# Patient Record
Sex: MALE | Race: WHITE | NOT HISPANIC OR LATINO | Employment: FULL TIME | URBAN - METROPOLITAN AREA
[De-identification: names, ages, dates, MRNs, and addresses within clinical notes are randomized per-mention and may not be internally consistent; named-entity substitution may affect disease eponyms.]

---

## 2017-01-13 ENCOUNTER — HOSPITAL ENCOUNTER (EMERGENCY)
Facility: CLINIC | Age: 32
Discharge: HOME | End: 2017-01-14
Attending: EMERGENCY MEDICINE | Admitting: EMERGENCY MEDICINE
Payer: COMMERCIAL

## 2017-01-13 DIAGNOSIS — S05.32XA: ICD-10-CM

## 2017-01-13 PROCEDURE — 99207 ZZC APP CREDIT; MD BILLING SHARED VISIT: CPT | Mod: Z6 | Performed by: EMERGENCY MEDICINE

## 2017-01-13 PROCEDURE — 96374 THER/PROPH/DIAG INJ IV PUSH: CPT

## 2017-01-13 PROCEDURE — 99285 EMERGENCY DEPT VISIT HI MDM: CPT | Mod: 25

## 2017-01-13 PROCEDURE — 96376 TX/PRO/DX INJ SAME DRUG ADON: CPT

## 2017-01-13 NOTE — IP AVS SNAPSHOT
MRN:[id number]                      After Visit Summary   1/13/2017    Enrique Gabriel    MRN: [id number]           Thank you!     Thank you for choosing [facility] for your care. Our goal is always to provide you with excellent care. Hearing back from our patients is one way we can continue to improve our services. Please take a few minutes to complete the written survey that you may receive in the mail after you visit with us. Thank you!        Patient Information     Date Of Birth          1985        About your hospital stay     You were admitted on:  N/A You last received care in the:  [facility]    You were discharged on:  January 14, 2017       Who to Call     For medical emergencies, please call 911.  For non-urgent questions about your medical care, please call your primary care provider or clinic, None  For questions related to your surgery, please call your surgery clinic        Attending Provider     Provider    Jimi Bunch MD       Primary Care Provider    Physician No Ref-Primary       No address on file        Further instructions from your care team       Follow up appointment at Opthalmology Clinic  [address] [place] [address]  [facility]   Sunday January 15th @ 9 am       Leave left eye patch/taped dressing over left eye until tomorrow's appointment. Any concerns please call Opthalmology Clinic answering service and/or [facility], ask for Surgery/Opthalmology on-call.     You can take your prescribed Percocet as directed. Do not use eye drops until you see your physician tomorrow.    Tips for taking pain medications    To get the best pain relief possible , remember these points:      Take pain medications as directed, before pain becomes severe      Pain medication can upset your stomach: taking it with food may help      Constipation is a common side effect of pain medication. Drink plenty of  Fluids      Eat foods high in  fiber. Take a stool softener  if recommended by your doctor or  Pharmacist.        Do not drink alcohol, drive or operate machinery while taking pain medications.      Ask about other ways to control pain, such as with heat, ice or relaxation.  [facility], [place]  Same-Day Surgery   Adult Discharge Orders & Instructions     For 24 hours after surgery    1. Get plenty of rest.  A responsible adult must stay with you for at least 24 hours after you leave the hospital.   2. Do not drive or use heavy equipment.  If you have weakness or tingling, don't drive or use heavy equipment until this feeling goes away.  3. Do not drink alcohol.  4. Avoid strenuous or risky activities.  Ask for help when climbing stairs.   5. You may feel lightheaded.  IF so, sit for a few minutes before standing.  Have someone help you get up.   6. If you have nausea (feel sick to your stomach): Drink only clear liquids such as apple juice, ginger ale, broth or 7-Up.  Rest may also help.  Be sure to drink enough fluids.  Move to a regular diet as you feel able.  7. You may have a slight fever. Call the doctor if your fever is over 100 F (37.7 C) (taken under the tongue) or lasts longer than 24 hours.  8. You may have a dry mouth, a sore throat, muscle aches or trouble sleeping.  These should go away after 24 hours.  9. Do not make important or legal decisions.   Call your doctor for any of the followin.  Signs of infection (fever, growing tenderness at the surgery site, a large amount of drainage or bleeding, severe pain, foul-smelling drainage, redness, swelling).    2. It has been over 8 to 10 hours since surgery and you are still not able to urinate (pass water).    3.  Headache for over 24 hours.    4.  Numbness, tingling or weakness the day after surgery (if you had spinal anesthesia).  To contact a doctor, call _______ @ _[phone]______ or:        [phone] and ask for the resident on  "call for   ___Surgery-Opthalmology__ (answered 24 hours a day)      Emergency Department:    [facility]: [phone]       (TTY for hearing impaired: [phone])    [facility]: [phone]       (TTY for hearing impaired: [phone])        Pending Results     No orders found for last 2 day(s).            Admission Information        Department Dept Phone    1/13/2017 [facility] Preop/Phase [phone]      Your Vitals Were     Blood Pressure Temperature Respirations    133/85 mmHg 98.6  F (37  C) (Oral) 16    Height Weight BMI (Body Mass Index)    1.676 m (5' 6\") 81.647 kg (180 lb) 29.07 kg/m2    Pulse Oximetry          97%        Care EveryWhere ID     This is your Care EveryWhere ID. This could be used by other organizations to access your [place] medical records  [id number]           Review of your medicines      START taking        Dose / Directions    ofloxacin 0.3 % ophthalmic solution   Commonly known as:  OCUFLOX   Used for:  Ruptured globe, left, initial encounter        Dose:  1 drop   Place 1 drop Into the left eye 4 times daily   Quantity:  1 Bottle   Refills:  11       oxyCODONE-acetaminophen 5-325 MG per tablet   Commonly known as:  PERCOCET   Used for:  Ruptured globe, left, initial encounter        Dose:  1 tablet   Take 1 tablet by mouth every 4 hours as needed for moderate to severe pain   Quantity:  12 tablet   Refills:  0       prednisoLONE acetate 1 % ophthalmic susp   Commonly known as:  PRED FORTE   Used for:  Ruptured globe, left, initial encounter        Dose:  1 drop   Place 1 drop Into the left eye 4 times daily   Quantity:  10 mL   Refills:  0            Where to get your medicines      Some of these will need a paper prescription and others can be bought over the counter. Ask your nurse if you have questions.     Bring a paper prescription for each of these medications    - ofloxacin 0.3 % ophthalmic solution  - oxyCODONE-acetaminophen 5-325 MG per tablet  - " prednisoLONE acetate 1 % ophthalmic susp             Protect others around you: Learn how to safely use, store and throw away your medicines at www.disposemymeds.org.             Medication List: This is a list of all your medications and when to take them. Check marks below indicate your daily home schedule. Keep this list as a reference.      Medications           Morning Afternoon Evening Bedtime As Needed    ofloxacin 0.3 % ophthalmic solution   Commonly known as:  OCUFLOX   Place 1 drop Into the left eye 4 times daily                                oxyCODONE-acetaminophen 5-325 MG per tablet   Commonly known as:  PERCOCET   Take 1 tablet by mouth every 4 hours as needed for moderate to severe pain                                prednisoLONE acetate 1 % ophthalmic susp   Commonly known as:  PRED FORTE   Place 1 drop Into the left eye 4 times daily

## 2017-01-13 NOTE — IP AVS SNAPSHOT
Same Day Surgery [address] [address]    Phone:  [phone]                                       After Visit Summary   1/13/2017    Enrique Gabriel    MRN: [id number]           After Visit Summary Signature Page     I have received my discharge instructions, and my questions have been answered. I have discussed any challenges I see with this plan with the nurse or doctor.    ..........................................................................................................................................  Patient/Patient Representative Signature      ..........................................................................................................................................  Patient Representative Print Name and Relationship to Patient    ..................................................               ................................................  Date                                            Time    ..........................................................................................................................................  Reviewed by Signature/Title    ...................................................              ..............................................  Date                                                            Time

## 2017-01-14 ENCOUNTER — ANESTHESIA EVENT (OUTPATIENT)
Dept: SURGERY | Facility: CLINIC | Age: 32
End: 2017-01-14
Payer: COMMERCIAL

## 2017-01-14 ENCOUNTER — ANESTHESIA (OUTPATIENT)
Dept: SURGERY | Facility: CLINIC | Age: 32
End: 2017-01-14
Payer: COMMERCIAL

## 2017-01-14 VITALS
WEIGHT: 180 LBS | DIASTOLIC BLOOD PRESSURE: 73 MMHG | BODY MASS INDEX: 28.93 KG/M2 | SYSTOLIC BLOOD PRESSURE: 137 MMHG | OXYGEN SATURATION: 98 % | TEMPERATURE: 97.8 F | RESPIRATION RATE: 16 BRPM | HEIGHT: 66 IN

## 2017-01-14 PROCEDURE — 25800025 ZZH RX 258: Performed by: OPHTHALMOLOGY

## 2017-01-14 PROCEDURE — 25000125 ZZHC RX 250: Performed by: NURSE ANESTHETIST, CERTIFIED REGISTERED

## 2017-01-14 PROCEDURE — 25000125 ZZHC RX 250: Performed by: OPHTHALMOLOGY

## 2017-01-14 PROCEDURE — 96376 TX/PRO/DX INJ SAME DRUG ADON: CPT | Mod: XU

## 2017-01-14 PROCEDURE — 71000014 ZZH RECOVERY PHASE 1 LEVEL 2 FIRST HR: Performed by: OPHTHALMOLOGY

## 2017-01-14 PROCEDURE — 25000566 ZZH SEVOFLURANE, EA 15 MIN: Performed by: OPHTHALMOLOGY

## 2017-01-14 PROCEDURE — 25000125 ZZHC RX 250: Performed by: ANESTHESIOLOGY

## 2017-01-14 PROCEDURE — 25000132 ZZH RX MED GY IP 250 OP 250 PS 637: Performed by: ANESTHESIOLOGY

## 2017-01-14 PROCEDURE — 36000062 ZZH SURGERY LEVEL 4 1ST 30 MIN - UMMC: Performed by: OPHTHALMOLOGY

## 2017-01-14 PROCEDURE — 71000027 ZZH RECOVERY PHASE 2 EACH 15 MINS: Performed by: OPHTHALMOLOGY

## 2017-01-14 PROCEDURE — 96374 THER/PROPH/DIAG INJ IV PUSH: CPT | Mod: XU

## 2017-01-14 PROCEDURE — 37000009 ZZH ANESTHESIA TECHNICAL FEE, EACH ADDTL 15 MIN: Performed by: OPHTHALMOLOGY

## 2017-01-14 PROCEDURE — 40000170 ZZH STATISTIC PRE-PROCEDURE ASSESSMENT II: Performed by: OPHTHALMOLOGY

## 2017-01-14 PROCEDURE — 25000132 ZZH RX MED GY IP 250 OP 250 PS 637: Performed by: OPHTHALMOLOGY

## 2017-01-14 PROCEDURE — 25000125 ZZHC RX 250: Performed by: STUDENT IN AN ORGANIZED HEALTH CARE EDUCATION/TRAINING PROGRAM

## 2017-01-14 PROCEDURE — 36000064 ZZH SURGERY LEVEL 4 EA 15 ADDTL MIN - UMMC: Performed by: OPHTHALMOLOGY

## 2017-01-14 PROCEDURE — 37000008 ZZH ANESTHESIA TECHNICAL FEE, 1ST 30 MIN: Performed by: OPHTHALMOLOGY

## 2017-01-14 PROCEDURE — 27210794 ZZH OR GENERAL SUPPLY STERILE: Performed by: OPHTHALMOLOGY

## 2017-01-14 RX ORDER — LIDOCAINE HYDROCHLORIDE 20 MG/ML
INJECTION, SOLUTION INFILTRATION; PERINEURAL PRN
Status: DISCONTINUED | OUTPATIENT
Start: 2017-01-14 | End: 2017-01-14

## 2017-01-14 RX ORDER — FENTANYL CITRATE 50 UG/ML
INJECTION, SOLUTION INTRAMUSCULAR; INTRAVENOUS PRN
Status: DISCONTINUED | OUTPATIENT
Start: 2017-01-14 | End: 2017-01-14

## 2017-01-14 RX ORDER — LABETALOL HYDROCHLORIDE 5 MG/ML
10 INJECTION, SOLUTION INTRAVENOUS
Status: DISCONTINUED | OUTPATIENT
Start: 2017-01-14 | End: 2017-01-14 | Stop reason: HOSPADM

## 2017-01-14 RX ORDER — FENTANYL CITRATE 50 UG/ML
25-50 INJECTION, SOLUTION INTRAMUSCULAR; INTRAVENOUS
Status: DISCONTINUED | OUTPATIENT
Start: 2017-01-14 | End: 2017-01-14 | Stop reason: HOSPADM

## 2017-01-14 RX ORDER — OXYCODONE AND ACETAMINOPHEN 5; 325 MG/1; MG/1
1 TABLET ORAL EVERY 4 HOURS PRN
Qty: 12 TABLET | Refills: 0 | Status: SHIPPED | OUTPATIENT
Start: 2017-01-14 | End: 2017-02-08

## 2017-01-14 RX ORDER — MEPERIDINE HYDROCHLORIDE 25 MG/ML
12.5 INJECTION INTRAMUSCULAR; INTRAVENOUS; SUBCUTANEOUS EVERY 5 MIN PRN
Status: DISCONTINUED | OUTPATIENT
Start: 2017-01-14 | End: 2017-01-14 | Stop reason: HOSPADM

## 2017-01-14 RX ORDER — KETOROLAC TROMETHAMINE 30 MG/ML
30 INJECTION, SOLUTION INTRAMUSCULAR; INTRAVENOUS
Status: DISCONTINUED | OUTPATIENT
Start: 2017-01-14 | End: 2017-01-14 | Stop reason: HOSPADM

## 2017-01-14 RX ORDER — HYDROMORPHONE HYDROCHLORIDE 1 MG/ML
.3-.5 INJECTION, SOLUTION INTRAMUSCULAR; INTRAVENOUS; SUBCUTANEOUS EVERY 10 MIN PRN
Status: DISCONTINUED | OUTPATIENT
Start: 2017-01-14 | End: 2017-01-14 | Stop reason: HOSPADM

## 2017-01-14 RX ORDER — CEFAZOLIN SODIUM 1 G/3ML
INJECTION, POWDER, FOR SOLUTION INTRAMUSCULAR; INTRAVENOUS PRN
Status: DISCONTINUED | OUTPATIENT
Start: 2017-01-14 | End: 2017-01-14

## 2017-01-14 RX ORDER — ERYTHROMYCIN 5 MG/G
OINTMENT OPHTHALMIC PRN
Status: DISCONTINUED | OUTPATIENT
Start: 2017-01-14 | End: 2017-01-14 | Stop reason: HOSPADM

## 2017-01-14 RX ORDER — PROPOFOL 10 MG/ML
INJECTION, EMULSION INTRAVENOUS PRN
Status: DISCONTINUED | OUTPATIENT
Start: 2017-01-14 | End: 2017-01-14

## 2017-01-14 RX ORDER — NALOXONE HYDROCHLORIDE 0.4 MG/ML
.1-.4 INJECTION, SOLUTION INTRAMUSCULAR; INTRAVENOUS; SUBCUTANEOUS
Status: DISCONTINUED | OUTPATIENT
Start: 2017-01-14 | End: 2017-01-14 | Stop reason: HOSPADM

## 2017-01-14 RX ORDER — NEOSTIGMINE METHYLSULFATE 1 MG/ML
VIAL (ML) INJECTION PRN
Status: DISCONTINUED | OUTPATIENT
Start: 2017-01-14 | End: 2017-01-14

## 2017-01-14 RX ORDER — ONDANSETRON 2 MG/ML
4 INJECTION INTRAMUSCULAR; INTRAVENOUS EVERY 30 MIN PRN
Status: DISCONTINUED | OUTPATIENT
Start: 2017-01-14 | End: 2017-01-14 | Stop reason: HOSPADM

## 2017-01-14 RX ORDER — LORAZEPAM 2 MG/ML
.5-1 INJECTION INTRAMUSCULAR
Status: DISCONTINUED | OUTPATIENT
Start: 2017-01-14 | End: 2017-01-14 | Stop reason: HOSPADM

## 2017-01-14 RX ORDER — SODIUM CHLORIDE, SODIUM LACTATE, POTASSIUM CHLORIDE, CALCIUM CHLORIDE 600; 310; 30; 20 MG/100ML; MG/100ML; MG/100ML; MG/100ML
INJECTION, SOLUTION INTRAVENOUS CONTINUOUS
Status: DISCONTINUED | OUTPATIENT
Start: 2017-01-14 | End: 2017-01-14 | Stop reason: HOSPADM

## 2017-01-14 RX ORDER — ONDANSETRON 2 MG/ML
INJECTION INTRAMUSCULAR; INTRAVENOUS PRN
Status: DISCONTINUED | OUTPATIENT
Start: 2017-01-14 | End: 2017-01-14

## 2017-01-14 RX ORDER — MEPERIDINE HYDROCHLORIDE 25 MG/ML
12.5 INJECTION INTRAMUSCULAR; INTRAVENOUS; SUBCUTANEOUS
Status: DISCONTINUED | OUTPATIENT
Start: 2017-01-14 | End: 2017-01-14 | Stop reason: HOSPADM

## 2017-01-14 RX ORDER — FENTANYL CITRATE 50 UG/ML
25-50 INJECTION, SOLUTION INTRAMUSCULAR; INTRAVENOUS EVERY 5 MIN PRN
Status: DISCONTINUED | OUTPATIENT
Start: 2017-01-14 | End: 2017-01-14 | Stop reason: HOSPADM

## 2017-01-14 RX ORDER — HYDROMORPHONE HYDROCHLORIDE 1 MG/ML
.3-.5 INJECTION, SOLUTION INTRAMUSCULAR; INTRAVENOUS; SUBCUTANEOUS EVERY 5 MIN PRN
Status: DISCONTINUED | OUTPATIENT
Start: 2017-01-14 | End: 2017-01-14 | Stop reason: HOSPADM

## 2017-01-14 RX ORDER — ONDANSETRON 4 MG/1
4 TABLET, ORALLY DISINTEGRATING ORAL EVERY 30 MIN PRN
Status: DISCONTINUED | OUTPATIENT
Start: 2017-01-14 | End: 2017-01-14 | Stop reason: HOSPADM

## 2017-01-14 RX ORDER — BALANCED SALT SOLUTION 6.4; .75; .48; .3; 3.9; 1.7 MG/ML; MG/ML; MG/ML; MG/ML; MG/ML; MG/ML
SOLUTION OPHTHALMIC PRN
Status: DISCONTINUED | OUTPATIENT
Start: 2017-01-14 | End: 2017-01-14 | Stop reason: HOSPADM

## 2017-01-14 RX ORDER — HYDRALAZINE HYDROCHLORIDE 20 MG/ML
2.5-5 INJECTION INTRAMUSCULAR; INTRAVENOUS EVERY 10 MIN PRN
Status: DISCONTINUED | OUTPATIENT
Start: 2017-01-14 | End: 2017-01-14 | Stop reason: HOSPADM

## 2017-01-14 RX ORDER — PROMETHAZINE HYDROCHLORIDE 25 MG/ML
12.5 INJECTION, SOLUTION INTRAMUSCULAR; INTRAVENOUS
Status: DISCONTINUED | OUTPATIENT
Start: 2017-01-14 | End: 2017-01-14 | Stop reason: HOSPADM

## 2017-01-14 RX ORDER — PREDNISOLONE ACETATE 10 MG/ML
1 SUSPENSION/ DROPS OPHTHALMIC 4 TIMES DAILY
Qty: 10 ML | Refills: 0 | Status: SHIPPED | OUTPATIENT
Start: 2017-01-14 | End: 2017-01-18

## 2017-01-14 RX ORDER — OXYCODONE AND ACETAMINOPHEN 5; 325 MG/1; MG/1
1 TABLET ORAL ONCE
Status: COMPLETED | OUTPATIENT
Start: 2017-01-14 | End: 2017-01-14

## 2017-01-14 RX ORDER — OFLOXACIN 3 MG/ML
1 SOLUTION/ DROPS OPHTHALMIC 4 TIMES DAILY
Qty: 1 BOTTLE | Refills: 11 | Status: SHIPPED | OUTPATIENT
Start: 2017-01-14 | End: 2017-01-18

## 2017-01-14 RX ORDER — ALBUTEROL SULFATE 0.83 MG/ML
2.5 SOLUTION RESPIRATORY (INHALATION) EVERY 4 HOURS PRN
Status: DISCONTINUED | OUTPATIENT
Start: 2017-01-14 | End: 2017-01-14 | Stop reason: HOSPADM

## 2017-01-14 RX ORDER — DEXAMETHASONE SODIUM PHOSPHATE 4 MG/ML
INJECTION, SOLUTION INTRA-ARTICULAR; INTRALESIONAL; INTRAMUSCULAR; INTRAVENOUS; SOFT TISSUE PRN
Status: DISCONTINUED | OUTPATIENT
Start: 2017-01-14 | End: 2017-01-14 | Stop reason: HOSPADM

## 2017-01-14 RX ORDER — GLYCOPYRROLATE 0.2 MG/ML
INJECTION, SOLUTION INTRAMUSCULAR; INTRAVENOUS PRN
Status: DISCONTINUED | OUTPATIENT
Start: 2017-01-14 | End: 2017-01-14

## 2017-01-14 RX ADMIN — Medication 1 TABLET: at 10:30

## 2017-01-14 RX ADMIN — FENTANYL CITRATE 25 MCG: 50 INJECTION, SOLUTION INTRAMUSCULAR; INTRAVENOUS at 09:47

## 2017-01-14 RX ADMIN — MIDAZOLAM HYDROCHLORIDE 2 MG: 1 INJECTION, SOLUTION INTRAMUSCULAR; INTRAVENOUS at 05:37

## 2017-01-14 RX ADMIN — PROPOFOL 160 MG: 10 INJECTION, EMULSION INTRAVENOUS at 05:45

## 2017-01-14 RX ADMIN — ONDANSETRON 6 MG: 2 INJECTION INTRAMUSCULAR; INTRAVENOUS at 07:31

## 2017-01-14 RX ADMIN — ROCURONIUM BROMIDE 50 MG: 10 INJECTION INTRAVENOUS at 05:45

## 2017-01-14 RX ADMIN — FENTANYL CITRATE 25 MCG: 50 INJECTION, SOLUTION INTRAMUSCULAR; INTRAVENOUS at 09:34

## 2017-01-14 RX ADMIN — CEFAZOLIN 1 G: 1 INJECTION, POWDER, FOR SOLUTION INTRAMUSCULAR; INTRAVENOUS at 07:56

## 2017-01-14 RX ADMIN — FENTANYL CITRATE 25 MCG: 50 INJECTION, SOLUTION INTRAMUSCULAR; INTRAVENOUS at 09:01

## 2017-01-14 RX ADMIN — FENTANYL CITRATE 50 MCG: 50 INJECTION, SOLUTION INTRAMUSCULAR; INTRAVENOUS at 05:45

## 2017-01-14 RX ADMIN — FENTANYL CITRATE 25 MCG: 50 INJECTION, SOLUTION INTRAMUSCULAR; INTRAVENOUS at 07:45

## 2017-01-14 RX ADMIN — LIDOCAINE HYDROCHLORIDE 80 MG: 20 INJECTION, SOLUTION INFILTRATION; PERINEURAL at 05:45

## 2017-01-14 RX ADMIN — SODIUM CHLORIDE, POTASSIUM CHLORIDE, SODIUM LACTATE AND CALCIUM CHLORIDE: 600; 310; 30; 20 INJECTION, SOLUTION INTRAVENOUS at 05:37

## 2017-01-14 RX ADMIN — FENTANYL CITRATE 25 MCG: 50 INJECTION, SOLUTION INTRAMUSCULAR; INTRAVENOUS at 09:08

## 2017-01-14 RX ADMIN — PROPOFOL 30 MG: 10 INJECTION, EMULSION INTRAVENOUS at 08:32

## 2017-01-14 RX ADMIN — Medication 3.5 MG: at 08:22

## 2017-01-14 RX ADMIN — FENTANYL CITRATE 25 MCG: 50 INJECTION, SOLUTION INTRAMUSCULAR; INTRAVENOUS at 07:28

## 2017-01-14 RX ADMIN — FENTANYL CITRATE 100 MCG: 50 INJECTION, SOLUTION INTRAMUSCULAR; INTRAVENOUS at 05:53

## 2017-01-14 RX ADMIN — SODIUM CHLORIDE, POTASSIUM CHLORIDE, SODIUM LACTATE AND CALCIUM CHLORIDE: 600; 310; 30; 20 INJECTION, SOLUTION INTRAVENOUS at 07:30

## 2017-01-14 RX ADMIN — GLYCOPYRROLATE 0.5 MG: 0.2 INJECTION, SOLUTION INTRAMUSCULAR; INTRAVENOUS at 08:22

## 2017-01-14 RX ADMIN — CEFAZOLIN 2 G: 1 INJECTION, POWDER, FOR SOLUTION INTRAMUSCULAR; INTRAVENOUS at 05:58

## 2017-01-14 RX ADMIN — OXYCODONE HYDROCHLORIDE AND ACETAMINOPHEN 1 TABLET: 5; 325 TABLET ORAL at 11:00

## 2017-01-14 ASSESSMENT — LIFESTYLE VARIABLES: TOBACCO_USE: 1

## 2017-01-14 ASSESSMENT — PAIN DESCRIPTION - DESCRIPTORS: DESCRIPTORS: ACHING;DISCOMFORT
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
## 2017-01-14 NOTE — OR NURSING
Paged Dr. Loza, and Christian for follow-up appointment and specific DC instructions on AVS. Added to AVS verbal instrctuctions from Dr. Garcia at bedside upon arrival to PACU.

## 2017-01-14 NOTE — OR NURSING
Spoke with patient mother Izabel, patient spouse and mother ([phone]) will arrive to hospital around 1400. Contacting family in cities to pick-up patient if possible prior to that time.:Left eye patch in place. CDI. [place].

## 2017-01-14 NOTE — ANESTHESIA PREPROCEDURE EVALUATION
Anesthesia Evaluation     . Pt has had prior anesthetic. Type: General      ROS/MED HX    ENT/Pulmonary:  - neg pulmonary ROS   (+)tobacco use, Current use 0.5 packs/day  , . .    Neurologic:  - neg neurologic ROS     Cardiovascular:  - neg cardiovascular ROS       METS/Exercise Tolerance:     Hematologic:  - neg hematologic  ROS       Musculoskeletal:  - neg musculoskeletal ROS       GI/Hepatic:         Renal/Genitourinary:  - ROS Renal section negative       Endo:  - neg endo ROS       Psychiatric:  - neg psychiatric ROS       Infectious Disease:  - neg infectious disease ROS       Malignancy:      - no malignancy   Other:               Physical Exam  Normal systems: cardiovascular and pulmonary    Airway   Mallampati: II  TM distance: >3 FB  Neck ROM: full    Dental   (+) missing    Cardiovascular       Pulmonary                     Anesthesia Plan      History & Physical Review  History and physical reviewed and following examination; no interval change.    ASA Status:  1 .    NPO Status:  > 8 hours    Plan for General with Intravenous and Propofol induction. Maintenance will be Balanced.    PONV prophylaxis:  Ondansetron (or other 5HT-3)       Postoperative Care  Postoperative pain management:  IV analgesics.      Consents  Anesthetic plan, risks, benefits and alternatives discussed with: .  Use of blood products discussed: No .   .                         .

## 2017-01-14 NOTE — ED NOTES
Bed: ED21  Expected date:   Expected time:   Means of arrival:   Comments:  Transfer from [facility]
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
## 2017-01-14 NOTE — ANESTHESIA CARE TRANSFER NOTE
Patient: Enrique Bustamantephrey    REPAIR RUPTURED GLOBE (Left Eye)  Additional InformationProcedure(s):  Ruputred globe left eye - Wound Class: I-Clean    Diagnosis: ruptured globe  Diagnosis Additional Information: No value filed.    Anesthesia Type:   General     Note:  Airway :Face Mask  Patient transferred to:PACU  Comments: Prior to extubation, patient breathing spontaneously and respiratory rate and tidal volume were appropriate. The patient was following commands. The patient was warm and demonstrated adequate strength. Patient was suctioned and extubated without complication.   Transported to PACU   VSS upon arrival   Care transferred to PACU RN        Vitals: (Last set prior to Anesthesia Care Transfer)              Electronically Signed By: Aureliano Wilson MD  January 14, 2017  8:47 AM

## 2017-01-14 NOTE — OR NURSING
Dr. Loza at bedside. Leave eye patch on left eye until tomorrow appointment. No eye drops until after then.

## 2017-01-14 NOTE — DISCHARGE INSTRUCTIONS
Follow up appointment at Opthalmology Clinic  [address] [facility]  [facility]   Sunday January 15th @ 9 am       Leave left eye patch/taped dressing over left eye until tomorrow's appointment. Any concerns please call Opthalmology Clinic answering service and/or [facility], ask for Surgery/Opthalmology on-call.     You can take your prescribed Percocet as directed. Do not use eye drops until you see your physician tomorrow.    Tips for taking pain medications    To get the best pain relief possible , remember these points:      Take pain medications as directed, before pain becomes severe      Pain medication can upset your stomach: taking it with food may help      Constipation is a common side effect of pain medication. Drink plenty of  Fluids      Eat foods high in fiber. Take a stool softener  if recommended by your doctor or  Pharmacist.        Do not drink alcohol, drive or operate machinery while taking pain medications.      Ask about other ways to control pain, such as with heat, ice or relaxation.  [facility], [place]  Same-Day Surgery   Adult Discharge Orders & Instructions     For 24 hours after surgery    1. Get plenty of rest.  A responsible adult must stay with you for at least 24 hours after you leave the hospital.   2. Do not drive or use heavy equipment.  If you have weakness or tingling, don't drive or use heavy equipment until this feeling goes away.  3. Do not drink alcohol.  4. Avoid strenuous or risky activities.  Ask for help when climbing stairs.   5. You may feel lightheaded.  IF so, sit for a few minutes before standing.  Have someone help you get up.   6. If you have nausea (feel sick to your stomach): Drink only clear liquids such as apple juice, ginger ale, broth or 7-Up.  Rest may also help.  Be sure to drink enough fluids.  Move to a regular diet as you feel able.  7. You may have a slight fever. Call the doctor if your fever is over  100 F (37.7 C) (taken under the tongue) or lasts longer than 24 hours.  8. You may have a dry mouth, a sore throat, muscle aches or trouble sleeping.  These should go away after 24 hours.  9. Do not make important or legal decisions.   Call your doctor for any of the followin.  Signs of infection (fever, growing tenderness at the surgery site, a large amount of drainage or bleeding, severe pain, foul-smelling drainage, redness, swelling).    2. It has been over 8 to 10 hours since surgery and you are still not able to urinate (pass water).    3.  Headache for over 24 hours.    4.  Numbness, tingling or weakness the day after surgery (if you had spinal anesthesia).  To contact a doctor, call _______ @ _[phone]______ or:        [phone] and ask for the resident on call for   ___Surgery-Opthalmology__ (answered 24 hours a day)      Emergency Department:    [facility]: [phone]       (TTY for hearing impaired: [phone])    [facility]: [phone]       (TTY for hearing impaired: [phone])

## 2017-01-14 NOTE — ANESTHESIA POSTPROCEDURE EVALUATION
Patient: Enrique Gabriel    REPAIR RUPTURED GLOBE (Left Eye)  Additional InformationProcedure(s):  Ruputred globe left eye - Wound Class: I-Clean    Diagnosis:ruptured globe  Diagnosis Additional Information: No value filed.    Anesthesia Type:  General    Note:  Anesthesia Post Evaluation    Patient location during evaluation: bedside and PACU  Patient participation: Able to fully participate in evaluation  Level of consciousness: awake and alert  Pain management: adequate  Airway patency: patent  Cardiovascular status: acceptable, blood pressure returned to baseline and stable  Respiratory status: acceptable and spontaneous ventilation  Hydration status: acceptable  PONV: none     Anesthetic complications: None    Comments: Eugenio Swenson MD  Staff Anesthesiologist  Phone: [phone]  January 14, 2017, 9:50 AM          Last vitals:  Filed Vitals:    01/14/17 0915 01/14/17 0930 01/14/17 0945   BP: 154/88 142/81 139/77   Temp:  36.7  C (98.1  F)    Resp: 18 16 16   SpO2: 94% 97% 93%       Electronically Signed By: Eugenio Swenson MD  January 14, 2017

## 2017-01-14 NOTE — BRIEF OP NOTE
Staff: Maryann Monroe MD    Fellow: Joselyn Loza MD    Resident: Arsen Gonzales MD    Pre-operative diagnosis:   1) Ruptured globe, left eye  2) Dislocated intraocular lens, left eye    Post-operative diagnosis:  1) Ruptured globe, left eye  2) Dislocated intraocular lens, left eye    Procedure performed:  1) Repair of ruptured globe, left eye    Anesthesia: General    Complications: None    Estimated blood loss: Less than 1 mL

## 2017-01-15 ENCOUNTER — OFFICE VISIT (OUTPATIENT)
Dept: OPHTHALMOLOGY | Facility: CLINIC | Age: 32
End: 2017-01-15

## 2017-01-15 DIAGNOSIS — S05.32XD RUPTURED GLOBE OF LEFT EYE, SUBSEQUENT ENCOUNTER: Primary | ICD-10-CM

## 2017-01-15 ASSESSMENT — TONOMETRY
OS_IOP_MMHG: 10
OD_IOP_MMHG: 15
IOP_METHOD: ICARE

## 2017-01-15 ASSESSMENT — CUP TO DISC RATIO: OD_RATIO: 0.25

## 2017-01-15 ASSESSMENT — EXTERNAL EXAM - LEFT EYE: OS_EXAM: NORMAL

## 2017-01-15 ASSESSMENT — VISUAL ACUITY
OD_CC: 20/30
METHOD: SNELLEN - LINEAR
OS_SC: CF @ 1'
CORRECTION_TYPE: CONTACTS

## 2017-01-15 ASSESSMENT — SLIT LAMP EXAM - LIDS
COMMENTS: NORMAL
COMMENTS: MILD LID EDEMA

## 2017-01-15 ASSESSMENT — EXTERNAL EXAM - RIGHT EYE: OD_EXAM: NORMAL

## 2017-01-15 NOTE — PROGRESS NOTES
I have confirmed the patient's and reviewed Past Medical History, Past Surgical History, Social History, Family History, Problem List, Medication List and agree with Tech note.    CC: Post op globe repair left eye    HPI: Enrique Gabriel is a 31 year old male POD#1 s/p globe repair, left eye. 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 Presented with dehiscence of cornea at limbus from 9 o'clock to 3'oclock with extremely hypotonous eye, vitreous in the anterior chamber, dislocated IOL, and possible retinal detachment. Underwent resuturing of cornea to limbus and limited peritomy without evidence of scleral laceration.    Today experiencing sharp, surface pain intermittently. Has not yet removed patch.     Ocular history additionally notable for previous globe rupture in the same (left) eye years ago s/p repair. States left eye was his dominant eye. Previous cataract surgery done at [facility] in [place].     Assessment/plan:   1.  POD#1 s/p repair of globe rupture/corneal dehiscence   -corneal wound reapproximated and Kerline negative   -intraocular pressure of 10   -dislocated IOL apparent with vitreous in the anterior chamber   -B scan shows vitreous debris (likely hemorrhage), ?retinal detachment   -start Prednisone QID   -start Vigamox QID   -no lifting greater than 10 pounds, no bending over past the waist. Sleep with eye shield at night and HOB elevated 30 degrees   -RTC Monday at 1230 for follow up in retina clinic with Dr Teresa. Plan of care was discussed with Dr Brii Gonzales MD  PGY3, Dept of Ophthalmology

## 2017-01-16 ENCOUNTER — OFFICE VISIT (OUTPATIENT)
Dept: OPHTHALMOLOGY | Facility: CLINIC | Age: 32
End: 2017-01-16
Attending: OPHTHALMOLOGY
Payer: COMMERCIAL

## 2017-01-16 DIAGNOSIS — H43.12 VITREOUS HEMORRHAGE, LEFT EYE (H): ICD-10-CM

## 2017-01-16 DIAGNOSIS — S05.32XA RUPTURED GLOBE OF LEFT EYE, INITIAL ENCOUNTER: Primary | ICD-10-CM

## 2017-01-16 DIAGNOSIS — Z98.890 POST-OPERATIVE STATE: Primary | ICD-10-CM

## 2017-01-16 PROCEDURE — 99213 OFFICE O/P EST LOW 20 MIN: CPT | Mod: ZF

## 2017-01-16 PROCEDURE — 40000269 ZZH STATISTIC NO CHARGE FACILITY FEE: Mod: ZF

## 2017-01-16 PROCEDURE — 76512 OPH US DX B-SCAN: CPT | Mod: ZF | Performed by: OPHTHALMOLOGY

## 2017-01-16 ASSESSMENT — EXTERNAL EXAM - RIGHT EYE
OD_EXAM: NORMAL
OD_EXAM: NORMAL

## 2017-01-16 ASSESSMENT — SLIT LAMP EXAM - LIDS
COMMENTS: MILD LID EDEMA
COMMENTS: MILD LID EDEMA
COMMENTS: NORMAL
COMMENTS: NORMAL

## 2017-01-16 ASSESSMENT — VISUAL ACUITY
CORRECTION_TYPE: CONTACTS
CORRECTION_TYPE: CONTACTS
OD_CC: 20/30
OD_CC+: +1
OS_CC: CF @ 1FT
OD_CC+: +1
METHOD: SNELLEN - LINEAR
OS_SC: CF @ 1 FT
METHOD: SNELLEN - LINEAR
OD_CC: 20/30

## 2017-01-16 ASSESSMENT — CUP TO DISC RATIO
OD_RATIO: 0.25
OD_RATIO: 0.25

## 2017-01-16 ASSESSMENT — TONOMETRY
OD_IOP_MMHG: 15
IOP_METHOD: ICARE
OS_IOP_MMHG: 06
OD_IOP_MMHG: 15
IOP_METHOD: ICARE
OS_IOP_MMHG: 06

## 2017-01-16 ASSESSMENT — EXTERNAL EXAM - LEFT EYE
OS_EXAM: NORMAL
OS_EXAM: NORMAL

## 2017-01-16 NOTE — MR AVS SNAPSHOT
After Visit Summary   1/16/2017    Enrique Gabriel    MRN: [id number]           Patient Information     Date Of Birth          1985        Visit Information        Provider Department      1/16/2017 12:30 PM Jennifer Teresa MD Eye Clinic        Today's Diagnoses     Post-operative state    -  1     Vitreous hemorrhage, left eye (H)            Follow-ups after your visit        Your next 10 appointments already scheduled     Jan 18, 2017  1:30 PM   RETURN RETINA with Jennifer Teresa MD   Eye Clinic ([facility])    [address] [place] [id number]   [phone]            Jan 24, 2017  1:15 PM   RETURN CORNEA with Maryann Monroe MD   Eye Clinic ([facility])    [address]  [address] [facility]  [address] [address]   [phone]              Who to contact     Please call your clinic at [phone] to:    Ask questions about your health    Make or cancel appointments    Discuss your medicines    Learn about your test results    Speak to your doctor   If you have compliments or concerns about an experience at your clinic, or if you wish to file a complaint, please contact [facility] Patient Relations at [phone] or email us at [email or web address]         Additional Information About Your Visit        Care EveryWhere ID     This is your Care EveryWhere ID. This could be used by other organizations to access your [facility] medical records  [id number]         Blood Pressure from Last 3 Encounters:   01/14/17 137/73    Weight from Last 3 Encounters:   01/13/17 81.647 kg (180 lb)              We Performed the Following     Ultrasound B-scan OS (left eye)     Ultrasound B-scan OS (left eye)          Today's Medication Changes          These changes are accurate as of: 1/16/17  2:19 PM.  If you have any questions, ask your nurse or doctor.                Start taking these medicines.        Dose/Directions    hypromellose 0.3 % Gel ophthalmic gel   Commonly known as:  GENTEAL   Used for:  Ruptured globe of left eye, initial encounter   Started by:  Jose G Hou MD        Dose:  0.5 inch   Place 0.5 g (0.5 inches) Into the left eye At Bedtime Apply approximately a half inch ribbon of ointment to the inside of your lower lids. Warning, application of the ointment to your eyes will cause temporary blurring of your vision from the ointment coating your eye. Please apply right before bedtime.   Quantity:  10 g   Refills:  11            Where to get your medicines      These medications were sent to [facility] [id number] [address] AT [address] [address]     Phone:  [phone]    - hypromellose 0.3 % Gel ophthalmic gel             Primary Care Provider    Physician No Ref-Primary       No address on file        Thank you!     Thank you for choosing EYE CLINIC  for your care. Our goal is always to provide you with excellent care. Hearing back from our patients is one way we can continue to improve our services. Please take a few minutes to complete the written survey that you may receive in the mail after your visit with us. Thank you!             Your Updated Medication List - Protect others around you: Learn how to safely use, store and throw away your medicines at www.disposemymeds.org.          This list is accurate as of: 1/16/17  2:19 PM.  Always use your most recent med list.                   Brand Name Dispense Instructions for use    hypromellose 0.3 % Gel ophthalmic gel    GENTEAL    10 g    Place 0.5 g (0.5 inches) Into the left eye At Bedtime Apply approximately a half inch ribbon of ointment to the inside of your lower lids. Warning, application of the ointment to your eyes will cause temporary blurring of your vision from the ointment coating your eye. Please apply right  before bedtime.       ofloxacin 0.3 % ophthalmic solution    OCUFLOX    1 Bottle    Place 1 drop Into the left eye 4 times daily       oxyCODONE-acetaminophen 5-325 MG per tablet    PERCOCET    12 tablet    Take 1 tablet by mouth every 4 hours as needed for moderate to severe pain       prednisoLONE acetate 1 % ophthalmic susp    PRED FORTE    10 mL    Place 1 drop Into the left eye 4 times daily

## 2017-01-16 NOTE — NURSING NOTE
Chief Complaints and History of Present Illnesses   Patient presents with     Post Op (Ophthalmology) Left Eye     S/P Globe repair with Corneal Dehisence 1-13-17     HPI    Last Eye Exam:  1/15/17   Affected eye(s):  Left   Symptoms:     Blurred vision         Do you have eye pain now?:  Yes   Location:  OS   Pain Level:  Mild Pain (2), Mild Pain (3)   Other:  Pain meds helpful, takes the edge off   Pain Frequency:  Intermittent   Pain Characteristics:  Sharp/Quick   Other:  More with movement      Comments:  Pt here for 2 day follow up. Some pain and discomfort, taking oral pain med which is helpful. A lot of tearing while keeping eye open. Photophobic. Vision remains very blurry. No problems using current drops and last drop used this morning. Notes that he is also been good and keeping head elevated. Notes that he has been told to see Cornea Dr today. ? GLADYS CHRISTINE, COA 12:38 PM 01/16/2017

## 2017-01-16 NOTE — PROGRESS NOTES
I have confirmed the patient's and reviewed Past Medical History, Past Surgical History, Social History, Family History, Problem List, Medication List and agree with Tech note.    CC: Post op globe repair left eye    HPI: Enrique Gabriel is a 31 year old male POD#2 s/p globe repair, left eye. 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 Presented with dehiscence of cornea at limbus from 9 o'clock to 3'oclock with extremely hypotonous eye, vitreous in the anterior chamber, dislocated IOL, and vitreous hemorrhage. questionable Retinal detachment  . Underwent resuturing of cornea to limbus and limited peritomy without evidence of scleral laceration.    Today subjectively with mild clearing of vision,still with intermittent scratching sensation with blink (were unable to bury all sutures).     B scan today shows vitreous debri, normal globe contour. Can not rule out Retinal detachment     Ocular history additionally notable for previous globe rupture in the same (left) eye years ago s/p repair. States left eye was his dominant eye. Previous cataract surgery done at [facility] in [place].     Assessment/plan:   1.  POD#2 s/p repair of globe rupture/corneal dehiscence   -corneal wound reapproximated and Kerline negative   -intraocular pressure of 06   -some vitreous in AC. Corneal edema.    -B scan shows vitreous debris (likely hemorrhage), no clear retinal detachment on exam or ultrasound   -increase Prednisone to 6x/day   -continue Vigamox QID   -no lifting greater than 10 pounds, no bending over past the waist. Sleep with eye shield at night and HOB elevated 30 degrees   -to see  today   -f/u Wednesday again in Retina and likely with Dr Ameya Gonzales MD  PGY3, Dept of Ophthalmology  ~~~~~~~~~~~~~~~~~~~~~~~~~~~~~~~~~~~~~~~~~~~~~~~~~~~~~~~~~~~~~~~~~~~~~~~~~~~~~~~~~~  Attending Physician  Attestation:  Complete documentation of historical and exam elements from today's encounter can be found in the full encounter summary report (not reduplicated in this progress note).  I personally obtained the chief complaint(s) and history of present illness.  I confirmed and edited as necessary the review of systems, past medical/surgical history, family history, social history, and examination findings as documented by others; and I examined the patient myself.  I personally reviewed the relevant tests, images, and reports as documented above and I agree with the interpretation as documented by the resident/fellow and edited by me.  I formulated and edited as necessary the assessment and plan and discussed the findings and management plan with the patient and family . In addition, when a procedure was performed, I was present for critical portions of the procedure and was immediately available for the entire procedure. - Jennifer Teresa M.D.

## 2017-01-16 NOTE — PROGRESS NOTES
CC: Post op globe repair left eye    HPI: Enrique Gabriel is a 31 year old male POD#2 s/p globe repair, left eye. 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 Presented with dehiscence of cornea at limbus from 9 o'clock to 3'oclock with extremely hypotonous eye, vitreous in the anterior chamber, dislocated IOL, and possible retinal detachment. Underwent resuturing of cornea to limbus and limited peritomy without evidence of scleral laceration.    Today subjectively with mild clearing of vision,still with intermittent scratching sensation with blink (were unable to bury all sutures).     Ocular history additionally notable for previous globe rupture in the same (left) eye years ago s/p repair. States left eye was his dominant eye. Previous cataract surgery done at [facility] in [place].     Assessment/plan:   1.  POD#2 s/p repair of globe rupture/corneal dehiscence   -corneal wound reapproximated and Kerline negative   -intraocular pressure of 06   -some vitreous in AC. Corneal edema.    -B scan shows vitreous debris (likely hemorrhage), no clear retinal detachment on exam or ultrasound   -increase Prednisone to 6x/day   -continue Vigamox QID   -no lifting greater than 10 pounds, no bending over past the waist. Sleep with eye shield at night and HOB elevated 30 degrees   -f/u Wednesday again in Retina and cornea   -Start ointment QID OS   -BCL in the future if patient has irritation from exposed sutures tails    ~~~~~~~~~~~~~~~~~~~~~~~~~~~~~~~~~~~~~~~~~~~~~~~~~~~~~~~~~~~~~~~~    I have personally examined the patient and agree with the assessment and plan as delineated by the resident / fellow.  I have confirmed the contents of the chief complaint, history of present illness, review of systems, and medical / surgical history sections and edited the note as needed.    Jose G Hou MD

## 2017-01-17 NOTE — OP NOTE
Expand All Collapse All    January 16, 2017    Enrique Gabriel MRN:[id number] YOB: 1930    PREOPERATIVE DIAGNOSIS: Globe rupture, left eye    POSTOPERATIVE DIAGNOSIS: same  PROCEDURE PERFORMED: Globe rupture, left eye    STAFF: Maryann Monroe MD  FELLOW: Joselyn Loza MD  RESIDENT: Arsen Gonzales MD  COMPLICATIONS: None.   INDICATIONS: Enrique Gabriel is a 31 year old male with keratoconus s/p corneal transplants x 2 both eyes who presented with globe rupture of the left eye after accidentally being kicked in the eye by his 5-year-old daugher. See preoperative H&P for further details. The benefits, risks and alternatives of surgery were offered to the family. They consented.    DESCRIPTION OF PROCEDURE: Enrique Gabriel was identified and brought to the operating room at the [facility] where general anesthesia was administered. The left eye was prepped and draped in the usual sterile manner for surgery. Utilizing a 30g needle-syringe, balanced salt solution was injected into the anterior chamber to minimally reinflate the globe, which remained hypotonous secondary to complete dehiscence of the cornea at the limbus from 9 to 3 o'clock, with an IOL haptic protruding from the corneal wound. After reinflation of the globe, the extruded IOL haptic was gently tucked back into the eye with a BSS canula. Explantation of the IOL was deferred due to concern for vitreous incarceration and possible retinal detachment. The cornea was reappromixated at the limbus utilizing 7 interrupted 10-0 nylon sutures. The knots were rotated towards the sclera and cut short due to inability to rotate into the eye due to low IOP.  After the corneal wound was found to be water-tight without leak utilizing injection of air bubbles into the anterior chamber, the eye remained soft. A limited conjunctival peritomy was performed in the superior quadrant just posterior to the area of  previously-dehisced cornea for additional exploration. No scleral laceration was identified, and the globe retained normal but soft contour without apparent aqueous leak throughout this time. The peritomy was closed utilizing 3 interrupted 8-0 vicryl sutures, 0.5cc of Decadron was injected into the inferior fornix, and an eye shield was applied to the left eye. The patient was extubated uneventfully and brought to the PACU in stable condition.    Arsen Gonzales MD  PGY3, Dept of Ophthalmology          ~~~~~~~~~~~~~~~~~~~~~~~~~~~~~~~~~~~~~~~~~~~~~~~~~~~~~~~~~~~~~~~~~~~    I was present and available for the entire duration of the procedure. I confirmed the contents of the operative note, and edited the note as needed.    Maryann Monroe MD  Cornea Fellow

## 2017-01-18 ENCOUNTER — OFFICE VISIT (OUTPATIENT)
Dept: OPHTHALMOLOGY | Facility: CLINIC | Age: 32
End: 2017-01-18
Attending: OPHTHALMOLOGY
Payer: COMMERCIAL

## 2017-01-18 DIAGNOSIS — S05.32XA: Primary | ICD-10-CM

## 2017-01-18 PROCEDURE — 99212 OFFICE O/P EST SF 10 MIN: CPT | Mod: ZF

## 2017-01-18 RX ORDER — PREDNISOLONE ACETATE 10 MG/ML
1 SUSPENSION/ DROPS OPHTHALMIC 4 TIMES DAILY
Qty: 10 ML | Refills: 1 | Status: SHIPPED | OUTPATIENT
Start: 2017-01-18 | End: 2018-09-05

## 2017-01-18 RX ORDER — OFLOXACIN 3 MG/ML
1 SOLUTION/ DROPS OPHTHALMIC 4 TIMES DAILY
Qty: 1 BOTTLE | Refills: 0 | Status: SHIPPED | OUTPATIENT
Start: 2017-01-18 | End: 2017-02-08

## 2017-01-18 ASSESSMENT — VISUAL ACUITY
OD_PH_CC: 20/20
CORRECTION_TYPE: CONTACTS
OS_CC: 20/150
METHOD: SNELLEN - LINEAR
OD_CC: 20/30

## 2017-01-18 ASSESSMENT — SLIT LAMP EXAM - LIDS
COMMENTS: MILD LID EDEMA
COMMENTS: NORMAL

## 2017-01-18 ASSESSMENT — EXTERNAL EXAM - LEFT EYE: OS_EXAM: NORMAL

## 2017-01-18 ASSESSMENT — TONOMETRY
OD_IOP_MMHG: 16
IOP_METHOD: ICARE
OS_IOP_MMHG: 14

## 2017-01-18 ASSESSMENT — CUP TO DISC RATIO: OD_RATIO: 0.25

## 2017-01-18 ASSESSMENT — EXTERNAL EXAM - RIGHT EYE: OD_EXAM: NORMAL

## 2017-01-18 NOTE — NURSING NOTE
Chief Complaints and History of Present Illnesses   Patient presents with     Post Op (Ophthalmology) Left Eye     s/p Post op globe repair left eye     HPI    Affected eye(s):  Left   Symptoms:     Blurred vision   Redness   Foreign body sensation   Tearing   No itching   No burning   Photophobia      Duration:  2 days   Frequency:  Constant       Do you have eye pain now?:  No      Comments:  Pt stated slight improvement with left over the last 2 days, he stated cloudy vision left eye.  Payam Valero  12:58 PM January 18, 2017

## 2017-01-18 NOTE — PROGRESS NOTES
"I have confirmed the patient's and reviewed Past Medical History, Past Surgical History, Social History, Family History, Problem List, Medication List and agree with Tech note.    CC: Post op globe repair left eye    HPI: Enrique Gabriel is a 31 year old male POD#4 s/p globe repair, left eye. 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 Presented with dehiscence of cornea at limbus from 9 o'clock to 3'oclock with extremely hypotonous eye, vitreous in the anterior chamber, dislocated IOL, and vitreous hemorrhage. questionable Retinal detachment  . Underwent resuturing of cornea to limbus and limited peritomy without evidence of scleral laceration.    Interval history: Has noted steady improvement of vision since last visit. Sees a \"moving\" black [place] in his vision that is diminishing in size. Has been experiencing less irritation from corneal sutures with use of artificial tear gel.    B scan 1/16/17 shows vitreous debri, normal globe contour. Can not rule out Retinal detachment     Ocular history additionally notable for previous globe rupture in the same (left) eye years ago s/p repair. States left eye was his dominant eye. Previous cataract surgery done at [facility] in [place].  Working on getting outside records faxed in.    Assessment/plan:   1.  POD#4 s/p repair of globe rupture/corneal dehiscence   -corneal wound reapproximated and Kerline negative   -intraocular pressure  Much improved today at 14   -some vitreous in AC. Corneal edema significantly improved today and 20/150 acuity.   -much clearer view of fundus with some clearing of vitreous hemorrhage. No retina detachment on exam though limited view inferiorly. There is a small bubble of air in vitreous cavity- stable    -continue Prednisone 6x/day   -continue Vigamox QID   -no lifting greater than 10 pounds, no bending over past the waist. " Sleep with eye shield at night and HOB elevated 30 degrees   -f/u 1 week- next Tuesday with Dr Stern and Dr Monroe   - pending scleral depression  Exam once patient can tolerate and cornea healing allows    Arsen Gonzales MD  PGY3, Dept of Ophthalmology        ~~~~~~~~~~~~~~~~~~~~~~~~~~~~~~~~~~~~~~~~~~~~~~~~~~~~~~~~~~~~~~~~~~~~~~~~~~~~~~~~~~  Attending Physician Attestation:  Complete documentation of historical and exam elements from today's encounter can be found in the full encounter summary report (not reduplicated in this progress note).  I personally obtained the chief complaint(s) and history of present illness.  I confirmed and edited as necessary the review of systems, past medical/surgical history, family history, social history, and examination findings as documented by others; and I examined the patient myself.  I personally reviewed the relevant tests, images, and reports as documented above and I agree with the interpretation as documented by the resident/fellow and edited by me.  I formulated and edited as necessary the assessment and plan and discussed the findings and management plan with the patient and family . In addition, when a procedure was performed, I was present for critical portions of the procedure and was immediately available for the entire procedure. - Jennifer Teresa M.D.

## 2017-01-24 ENCOUNTER — OFFICE VISIT (OUTPATIENT)
Dept: OPHTHALMOLOGY | Facility: CLINIC | Age: 32
End: 2017-01-24
Attending: OPHTHALMOLOGY
Payer: COMMERCIAL

## 2017-01-24 DIAGNOSIS — Z48.810 AFTERCARE FOLLOWING SURGERY OF A SENSORY ORGAN: Primary | ICD-10-CM

## 2017-01-24 DIAGNOSIS — H43.12 VITREOUS HEMORRHAGE, LEFT EYE (H): ICD-10-CM

## 2017-01-24 DIAGNOSIS — S05.32XD RUPTURED GLOBE OF LEFT EYE, SUBSEQUENT ENCOUNTER: Primary | ICD-10-CM

## 2017-01-24 PROCEDURE — 40000269 ZZH STATISTIC NO CHARGE FACILITY FEE: Mod: ZF

## 2017-01-24 PROCEDURE — 99212 OFFICE O/P EST SF 10 MIN: CPT | Mod: ZF

## 2017-01-24 ASSESSMENT — SLIT LAMP EXAM - LIDS
COMMENTS: PROTECTIVE PTOSIS
COMMENTS: NORMAL
COMMENTS: PROTECTIVE PTOSIS
COMMENTS: NORMAL

## 2017-01-24 ASSESSMENT — TONOMETRY
IOP_METHOD: ICARE
OD_IOP_MMHG: 14
OD_IOP_MMHG: 14
OS_IOP_MMHG: 10
IOP_METHOD: ICARE
OS_IOP_MMHG: 10

## 2017-01-24 ASSESSMENT — VISUAL ACUITY
OS_SC: 20/200
CORRECTION_TYPE: CONTACTS
METHOD: SNELLEN - LINEAR
OD_CC: 20/20
OD_CC: 20/20
CORRECTION_TYPE: CONTACTS
METHOD: SNELLEN - LINEAR
OS_SC: 20/200

## 2017-01-24 ASSESSMENT — EXTERNAL EXAM - LEFT EYE
OS_EXAM: NORMAL
OS_EXAM: NORMAL

## 2017-01-24 ASSESSMENT — EXTERNAL EXAM - RIGHT EYE
OD_EXAM: NORMAL
OD_EXAM: NORMAL

## 2017-01-24 NOTE — MR AVS SNAPSHOT
After Visit Summary   1/24/2017    Enrique Gabriel    MRN: [id number]           Patient Information     Date Of Birth          1985        Visit Information        Provider Department      1/24/2017 1:15 PM Maryann Monroe MD Eye Clinic        Today's Diagnoses     Aftercare following surgery of a sensory organ    -  1        Follow-ups after your visit        Your next 10 appointments already scheduled     Feb 15, 2017  1:30 PM   RETURN CORNEA with Jose G Hou MD   Eye Clinic ([facility])    [address]  [address] [facility]  [address] [address] [place] [id number]   [phone]            Feb 15, 2017  2:15 PM   RETURN RETINA with Jennifer Teresa MD   Eye Clinic ([facility])    [address] [facility]  [address] [address] [place] [id number] [phone]              Who to contact     Please call your clinic at [phone] to:    Ask questions about your health    Make or cancel appointments    Discuss your medicines    Learn about your test results    Speak to your doctor   If you have compliments or concerns about an experience at your clinic, or if you wish to file a complaint, please contact [facility] Patient Relations at [phone] or email us at [email or web address]         Additional Information About Your Visit        Care EveryWhere ID     This is your Care EveryWhere ID. This could be used by other organizations to access your [facility] medical records  [id number]         Blood Pressure from Last 3 Encounters:   01/14/17 137/73    Weight from Last 3 Encounters:   01/13/17 81.647 kg (180 lb)              Today, you had the following     No orders found for display       Primary Care Provider    Physician No Ref-Primary       No address on file        Thank you!     Thank you for choosing EYE CLINIC  for your care. Our goal is always to provide you with excellent care.  Hearing back from our patients is one way we can continue to improve our services. Please take a few minutes to complete the written survey that you may receive in the mail after your visit with us. Thank you!             Your Updated Medication List - Protect others around you: Learn how to safely use, store and throw away your medicines at www.disposemymeds.org.          This list is accurate as of: 1/24/17  2:45 PM.  Always use your most recent med list.                   Brand Name Dispense Instructions for use    hypromellose 0.3 % Gel ophthalmic gel    GENTEAL    10 g    Place 0.5 g (0.5 inches) Into the left eye At Bedtime Apply approximately a half inch ribbon of ointment to the inside of your lower lids. Warning, application of the ointment to your eyes will cause temporary blurring of your vision from the ointment coating your eye. Please apply right before bedtime.       ofloxacin 0.3 % ophthalmic solution    OCUFLOX    1 Bottle    Place 1 drop Into the left eye 4 times daily       oxyCODONE-acetaminophen 5-325 MG per tablet    PERCOCET    12 tablet    Take 1 tablet by mouth every 4 hours as needed for moderate to severe pain       prednisoLONE acetate 1 % ophthalmic susp    PRED FORTE    10 mL    Place 1 drop Into the left eye 4 times daily

## 2017-01-24 NOTE — NURSING NOTE
Chief Complaints and History of Present Illnesses   Patient presents with     Post Op (Ophthalmology) Left Eye     1 week post of LE     HPI    Affected eye(s):  Left   Symptoms:        Duration:  1 week      Do you have eye pain now?:  No      Comments:  1 week post op LE  S/P Globe repair with Corneal Dehiscence  1-14-17   Scratching feeling LE   pred forte q6h   Ofloxacin qid ASIM TANG 12:57 PM January 24, 2017

## 2017-01-24 NOTE — NURSING NOTE
Chief Complaints and History of Present Illnesses   Patient presents with     Post Op (Ophthalmology) Left Eye     S/P Globe repair with Corneal Dehiscence  1-14-17     HPI    Affected eye(s):  Left   Symptoms:           Do you have eye pain now?:  No      Comments:  S/P Globe repair with Corneal Dehiscence  1-14-17  Scratching feeling LE  pred forte q6h  Ofloxacin qid ASIM TANG 12:50 PM January 24, 2017

## 2017-01-24 NOTE — PROGRESS NOTES
"CC: Post op globe repair left eye    HPI: Enrique Gabriel is a 31 year old male POD#4 s/p globe repair, left eye. 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 Presented with dehiscence of cornea at limbus from 9 o'clock to 3'oclock with extremely hypotonous eye, vitreous in the anterior chamber, dislocated IOL, and vitreous hemorrhage. questionable Retinal detachment  . Underwent resuturing of cornea to limbus and limited peritomy without evidence of scleral laceration.    Interval history: Has noted steady improvement of vision since last visit. Sees a \"moving\" black [place] in his vision that is diminishing in size. Has been experiencing less irritation from corneal sutures with use of artificial tear gel.    B scan 1/16/17 shows vitreous debri, normal globe contour. Can not rule out Retinal detachment     Ocular history additionally notable for previous globe rupture in the same (left) eye years ago s/p repair. States left eye was his dominant eye. Previous cataract surgery done at [facility] in [place].  Working on getting outside records faxed in.    Assessment/plan:   1.  POD#10 s/p repair of globe rupture/corneal dehiscence   - corneal wound appears to be healing well   - IOP stable   - much clearer view of fundus with some clearing of vitreous hemorrhage. No retina detachment on exam though limited view inferiorly   -continue drops per cornea   - no lifting greater than 10 pounds, no bending over past the waist. Sleep with eye shield at night and HOB elevated 30 degrees   - f/u 2-3 weeks with cornea (Ameya) and retina (Cidra)   - pending scleral depression - likely should be okay at next visit (1+ month out from injury)    ATTESTATION    I have confirmed the CC, PMH, HPI, history, ROS, and exam findings by the technician or others, and modified by me where needed. I have confirmed and edited " as necessary the relevant ophthalmic history, ROS, eye exam findings, and the neuro exam findings as obtained by others. I have seen and examined this patient. I was present for critical portions of the procedure carried out by the resident/fellow and immediately available for the entire procedure and I personally viewed the image(s) and I agree with the interpretation as documented by the resident/fellow/or others and edited by me.  Eldon Stern MD  Retina Fellow

## 2017-01-24 NOTE — PROGRESS NOTES
CC: Post op globe repair left eye    HPI: Enrique Gabriel is a 31 year old male s/p globe repair, left eye. 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 Presented with dehiscence of cornea at limbus from 9 o'clock to 3'oclock with extremely hypotonous eye, vitreous in the anterior chamber, dislocated IOL, and vitreous hemorrhage. questionable Retinal detachment  . Underwent resuturing of cornea to limbus and limited peritomy without evidence of scleral laceration.    Interval history: Vision hazy in AM, improves as the day goes on.    B scan 1/16/17 shows vitreous debri, normal globe contour. Can not rule out Retinal detachment     Ocular history additionally notable for previous globe rupture in the same (left) eye years ago s/p repair. States left eye was his dominant eye. Previous cataract surgery done at [facility] in [place]. Secondary IOL left eye in 2003. H/o graft rejection left eye treated in 2011.    Assessment/Plan:     1.  POW#2 s/p repair of globe rupture/corneal dehiscence left eye (1/14/17)   -corneal wound reapproximated (exposed knots and suture tails) and Kerline negative   -intraocular pressure stable   -some vitreous in AC   -decrease Prednisone to 4x/day   -stop Vigamox QID   -continue Genteal as needed   -f/u Cornea 3-4 weeks

## 2017-02-04 ENCOUNTER — TELEPHONE (OUTPATIENT)
Dept: NURSING | Facility: CLINIC | Age: 32
End: 2017-02-04

## 2017-02-04 NOTE — TELEPHONE ENCOUNTER
Call Type: Triage Call    Presenting Problem:  Worsening cloudy vision for the last 24 hours .  Triage Note:  Guideline Title: Eye: Pain or Vision Change  Recommended Disposition: See ED Immediately  Original Inclination: Did not know what to do  Override Disposition:  Intended Action: Go to Hospital / ED  Physician Contacted: No  Sudden loss or change in vision (double or blurred vision, increased light  sensitivity, partial loss of visual field) AND not previously evaluated ?  YES  Injury to eye OR chemical splash or spray ? NO  Sudden total loss of vision in one or both eyes ? NO  Eye symptoms part of typical migraine headache pattern ? NO  Sudden onset of severe pain in or around or behind eye(s) ? NO  Sudden vision change associated with new difficulty speaking/swallowing; using an  arm/leg; or new one-sided weakness (face drooping) ? NO  Physician Instructions:  Care Advice: Another adult should drive.  Wear dark UV-blocking sunglasses to protect eyes from sun exposure or for  light sensitivity.  IMMEDIATE ACTION

## 2017-02-08 ENCOUNTER — OFFICE VISIT (OUTPATIENT)
Dept: OPHTHALMOLOGY | Facility: CLINIC | Age: 32
End: 2017-02-08
Attending: OPHTHALMOLOGY
Payer: COMMERCIAL

## 2017-02-08 DIAGNOSIS — S05.32XD RUPTURED GLOBE OF LEFT EYE, SUBSEQUENT ENCOUNTER: Primary | ICD-10-CM

## 2017-02-08 PROCEDURE — 40000269 ZZH STATISTIC NO CHARGE FACILITY FEE: Mod: ZF

## 2017-02-08 PROCEDURE — 99212 OFFICE O/P EST SF 10 MIN: CPT | Mod: 27,ZF

## 2017-02-08 ASSESSMENT — TONOMETRY
IOP_METHOD: ICARE
IOP_METHOD: ICARE
OD_IOP_MMHG: 16
OS_IOP_MMHG: 06
OS_IOP_MMHG: 06
OD_IOP_MMHG: 16

## 2017-02-08 ASSESSMENT — REFRACTION_WEARINGRX
OS_CYLINDER: +2.25
OS_SPHERE: -1.25
OS_AXIS: 098
OD_CYLINDER: +2.25
OD_SPHERE: -5.50
OD_AXIS: 132

## 2017-02-08 ASSESSMENT — VISUAL ACUITY
OD_PH_CC: 20/20-1
OD_PH_CC: 20/20-1
OS_PH_CC: 20/40-1
CORRECTION_TYPE: GLASSES
METHOD: SNELLEN - LINEAR
METHOD: SNELLEN - LINEAR
OS_PH_CC: 20/40-1
OS_CC: 20/125
OD_CC: 20/30
OS_CC: 20/125
OD_CC: 20/30

## 2017-02-08 ASSESSMENT — SLIT LAMP EXAM - LIDS
COMMENTS: NORMAL
COMMENTS: NORMAL
COMMENTS: PROTECTIVE PTOSIS
COMMENTS: PROTECTIVE PTOSIS

## 2017-02-08 ASSESSMENT — EXTERNAL EXAM - LEFT EYE
OS_EXAM: NORMAL
OS_EXAM: NORMAL

## 2017-02-08 ASSESSMENT — EXTERNAL EXAM - RIGHT EYE
OD_EXAM: NORMAL
OD_EXAM: NORMAL

## 2017-02-08 NOTE — PROGRESS NOTES
CC: Post op globe repair left eye    HPI: Enrique Gabriel is a 31 year old male s/p globe repair, left eye. 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 Presented with dehiscence of cornea at limbus from 9 o'clock to 3'oclock with extremely hypotonous eye, vitreous in the anterior chamber, dislocated IOL, and vitreous hemorrhage. questionable Retinal detachment  . Underwent resuturing of cornea to limbus and limited peritomy without evidence of scleral laceration.    Interval history: Irritation worse in the LE.    B scan 1/16/17 shows vitreous debri, normal globe contour. Can not rule out Retinal detachment     Ocular history additionally notable for previous globe rupture in the same (left) eye years ago s/p repair. States left eye was his dominant eye. Previous cataract surgery done at [facility] in [place]. Secondary IOL left eye in 2003. H/o graft rejection left eye treated in 2011.    Assessment/Plan:     1.  POW#3 s/p repair of globe rupture/corneal dehiscence left eye (1/14/17)   -corneal wound reapproximated (exposed knots and suture tails x 10) and Kerline negative   -intraocular pressure stable   -some vitreous in AC    -decrease Prednisolone  Three times a day   - can place BCL left eye   -start Vigamox BID   -continue Genteal as needed   -f/u Cornea 1 month  ~~~~~~~~~~~~~~~~~~~~~~~~~~~~~~~~~~~~~~~~~~~~~~~~~~~~~~~~~~~~~~~~~~~~~~~~~~~~~~~~~~  Attending Physician Attestation:  Complete documentation of historical and exam elements from today's encounter can be found in the full encounter summary report (not reduplicated in this progress note).  I personally obtained the chief complaint(s) and history of present illness.  I confirmed and edited as necessary the review of systems, past medical/surgical history, family history, social history, and examination findings as documented by others; and  I examined the patient myself.  I personally reviewed the relevant tests, images, and reports as documented above and I agree with the interpretation as documented by the resident/fellow and edited by me.  I formulated and edited as necessary the assessment and plan and discussed the findings and management plan with the patient and family . In addition, when a procedure was performed, I was present for critical portions of the procedure and was immediately available for the entire procedure. - Jennifer Teresa M.D.

## 2017-02-08 NOTE — NURSING NOTE
Chief Complaints and History of Present Illnesses   Patient presents with     Post Op (Ophthalmology) Left Eye     OGR     HPI    Symptoms:           Do you have eye pain now?:  No      Comments:  LE very bothersome over the weekend. Better in last couple days, AT help. [facility] slowly improving. Using pred yuliana BARR AT rafi TANG February 8, 2017 1:52 PM

## 2017-02-08 NOTE — MR AVS SNAPSHOT
After Visit Summary   2/8/2017    Enrique Gabriel    MRN: [id number]           Patient Information     Date Of Birth          1985        Visit Information        Provider Department      2/8/2017 2:30 PM Jose G Hou MD [facility]        Today's Diagnoses     Ruptured globe of left eye, subsequent encounter    -  1        Follow-ups after your visit        Your next 10 appointments already scheduled     Feb 22, 2017  2:00 PM   RETURN RETINA with Jennifer Teresa MD   Eye Clinic ([facility])    [address]  [address]   [place] [address] [place] [id number]   [phone]            Feb 22, 2017  2:30 PM   RETURN CORNEA with Jose G Hou MD   Eye Clinic ([facility])    [address]  [address] [facility]  [place] [address] [place] [id number]   [phone]              Who to contact     Please call your clinic at [phone] to:    Ask questions about your health    Make or cancel appointments    Discuss your medicines    Learn about your test results    Speak to your doctor   If you have compliments or concerns about an experience at your clinic, or if you wish to file a complaint, please contact [facility] Patient Relations at [phone] or email us at [email or web address]         Additional Information About Your Visit        Care EveryWhere ID     This is your Care EveryWhere ID. This could be used by other organizations to access your [place] medical records  [id number]         Blood Pressure from Last 3 Encounters:   01/14/17 137/73    Weight from Last 3 Encounters:   01/13/17 81.647 kg (180 lb)              Today, you had the following     No orders found for display         Today's Medication Changes          These changes are accurate as of: 2/8/17  4:08 PM.  If you have any questions, ask your nurse or doctor.               Stop taking these medicines if you haven't  already. Please contact your care team if you have questions.     ofloxacin 0.3 % ophthalmic solution   Commonly known as:  OCUFLOX   Stopped by:  Jennifer Teresa MD           oxyCODONE-acetaminophen 5-325 MG per tablet   Commonly known as:  PERCOCET   Stopped by:  Jennifer Teresa MD                    Primary Care Provider    Physician No Ref-Primary       No address on file        Thank you!     Thank you for choosing EYE CLINIC  for your care. Our goal is always to provide you with excellent care. Hearing back from our patients is one way we can continue to improve our services. Please take a few minutes to complete the written survey that you may receive in the mail after your visit with us. Thank you!             Your Updated Medication List - Protect others around you: Learn how to safely use, store and throw away your medicines at www.disposemymeds.org.          This list is accurate as of: 2/8/17  4:08 PM.  Always use your most recent med list.                   Brand Name Dispense Instructions for use    hypromellose 0.3 % Gel ophthalmic gel    GENTEAL    10 g    Place 0.5 g (0.5 inches) Into the left eye At Bedtime Apply approximately a half inch ribbon of ointment to the inside of your lower lids. Warning, application of the ointment to your eyes will cause temporary blurring of your vision from the ointment coating your eye. Please apply right before bedtime.       prednisoLONE acetate 1 % ophthalmic susp    PRED FORTE    10 mL    Place 1 drop Into the left eye 4 times daily

## 2017-02-08 NOTE — PROGRESS NOTES
"CC: Post op globe repair left eye    HPI: Enrique Gabriel is a 31 year old male POD#4 s/p globe repair, left eye. 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 Presented with dehiscence of cornea at limbus from 9 o'clock to 3'oclock with extremely hypotonous eye, vitreous in the anterior chamber, dislocated IOL, and vitreous hemorrhage. questionable Retinal detachment  . Underwent resuturing of cornea to limbus and limited peritomy without evidence of scleral laceration.    Interval history: Has noted steady improvement of vision since last visit. Sees a \"moving\" black [place] in his vision that is diminishing in size. Has been experiencing less irritation from corneal sutures with use of artificial tear gel.    B scan 1/16/17 shows vitreous debri, normal globe contour. Can not rule out Retinal detachment     Ocular history additionally notable for previous globe rupture in the same (left) eye years ago s/p repair. States left eye was his dominant eye. Previous cataract surgery done at [facility] in [place].  Working on getting outside records faxed in.    Assessment/plan:   1.  POD#10 s/p repair of globe rupture/corneal dehiscence   - corneal wound appears to be healing well   - IOP stable   - much clearer view of fundus with some clearing of vitreous hemorrhage. No retina detachment on exam though limited view inferiorly and on mild scleral depression     -continue drops per cornea   - no lifting greater than 10 pounds, no bending over past the waist. Sleep with eye shield at night and HOB elevated 30 degrees   - f/u 2-3 weeks with cornea (Ameya) and retina (Gunlock). If stable then extend malik to one month  Retina detachment precautions were discussed with the patient (presence or increased in flashes, floaters or a curtain in the visual field) and was asked to return if any of the those " occur    ~~~~~~~~~~~~~~~~~~~~~~~~~~~~~~~~~~~~~~~~~~~~~~~~~~~~~~~~~~~~~~~~~~~~~~~~~~~~~~~~~~  Attending Physician Attestation:  Complete documentation of historical and exam elements from today's encounter can be found in the full encounter summary report (not reduplicated in this progress note).  I personally obtained the chief complaint(s) and history of present illness.  I confirmed and edited as necessary the review of systems, past medical/surgical history, family history, social history, and examination findings as documented by others; and I examined the patient myself.  I personally reviewed the relevant tests, images, and reports as documented above and I agree with the interpretation as documented by the resident/fellow and edited by me.  I formulated and edited as necessary the assessment and plan and discussed the findings and management plan with the patient and family . In addition, when a procedure was performed, I was present for critical portions of the procedure and was immediately available for the entire procedure. - Jennifer Teresa M.D.

## 2017-02-08 NOTE — NURSING NOTE
Chief Complaints and History of Present Illnesses   Patient presents with     Post Op (Ophthalmology) Left Eye     OGR LE     HPI    Symptoms:              Comments:  LE very bothersome over the weekend. Better in last couple days, AT help. [facility] slowly improving. Using pred yuliana Rodirguez February 8, 2017 1:52 PM

## 2017-02-22 ENCOUNTER — OFFICE VISIT (OUTPATIENT)
Dept: OPHTHALMOLOGY | Facility: CLINIC | Age: 32
End: 2017-02-22
Attending: OPHTHALMOLOGY
Payer: COMMERCIAL

## 2017-02-22 DIAGNOSIS — T81.31XD: ICD-10-CM

## 2017-02-22 DIAGNOSIS — Z98.890 POSTOPERATIVE EYE STATE: Primary | ICD-10-CM

## 2017-02-22 DIAGNOSIS — S05.32XD RUPTURED GLOBE OF LEFT EYE, SUBSEQUENT ENCOUNTER: Primary | ICD-10-CM

## 2017-02-22 DIAGNOSIS — H43.12 VITREOUS HEMORRHAGE, LEFT EYE (H): ICD-10-CM

## 2017-02-22 PROCEDURE — 99212 OFFICE O/P EST SF 10 MIN: CPT | Mod: ZF

## 2017-02-22 PROCEDURE — 40000269 ZZH STATISTIC NO CHARGE FACILITY FEE: Mod: ZF

## 2017-02-22 ASSESSMENT — CONF VISUAL FIELD
OD_NORMAL: 1
OS_NORMAL: 1
OS_NORMAL: 1
OD_NORMAL: 1

## 2017-02-22 ASSESSMENT — VISUAL ACUITY
OS_CC: 20/100
METHOD: SNELLEN - LINEAR
OD_CC: 20/30
METHOD: SNELLEN - LINEAR
CORRECTION_TYPE: GLASSES
OD_CC: 20/30
OS_PH_CC: 20/60
OS_PH_CC: 20/60
OS_CC: 20/100

## 2017-02-22 ASSESSMENT — TONOMETRY
OD_IOP_MMHG: 09
IOP_METHOD: TONOPEN
IOP_METHOD: TONOPEN
OD_IOP_MMHG: 9
OS_IOP_MMHG: 09
OS_IOP_MMHG: 9

## 2017-02-22 ASSESSMENT — EXTERNAL EXAM - LEFT EYE
OS_EXAM: NORMAL
OS_EXAM: NORMAL

## 2017-02-22 ASSESSMENT — SLIT LAMP EXAM - LIDS
COMMENTS: NORMAL
COMMENTS: PROTECTIVE PTOSIS
COMMENTS: PROTECTIVE PTOSIS
COMMENTS: NORMAL

## 2017-02-22 ASSESSMENT — EXTERNAL EXAM - RIGHT EYE
OD_EXAM: NORMAL
OD_EXAM: NORMAL

## 2017-02-22 ASSESSMENT — CUP TO DISC RATIO
OD_RATIO: 0.4
OS_RATIO: 0.4
OS_RATIO: 0.4
OD_RATIO: 0.4

## 2017-02-22 NOTE — NURSING NOTE
Chief Complaints and History of Present Illnesses   Patient presents with     Follow Up For     2 week follow up LE globe repair     HPI    Affected eye(s):  Left   Symptoms:     No floaters   No flashes   No glare   No halos      Duration:  2 weeks      Do you have eye pain now?:  No      Comments:  repair of globe rupture/corneal dehiscence left eye (1/14/17)  pred [place] LE  Joe Ocampo [place] CLYDE 2:09 PM February 22, 2017

## 2017-02-22 NOTE — MR AVS SNAPSHOT
After Visit Summary   2/22/2017    Enrique Gabriel    MRN: [id number]           Patient Information     Date Of Birth          1985        Visit Information        Provider Department      2/22/2017 2:30 PM Jose G Hou MD [facility] Clinic        Today's Diagnoses     Ruptured globe of left eye, subsequent encounter    -  1    Vitreous hemorrhage, left eye (H)           Follow-ups after your visit        Follow-up notes from your care team     Return in about 1 month (around 3/22/2017).      Your next 10 appointments already scheduled     Mar 22, 2017  1:45 PM CDT   RETURN CORNEA with Jose G Hou MD   Eye Clinic ([facility])    [address]  [address] [facility]  [address]  [facility] [id number] [phone]              Who to contact     Please call your clinic at [phone] to:    Ask questions about your health    Make or cancel appointments    Discuss your medicines    Learn about your test results    Speak to your doctor   If you have compliments or concerns about an experience at your clinic, or if you wish to file a complaint, please contact [facility] Patient Relations at [phone] or email us at [email or web address]         Additional Information About Your Visit        Care EveryWhere ID     This is your Care EveryWhere ID. This could be used by other organizations to access your [place] medical records  [id number]         Blood Pressure from Last 3 Encounters:   01/14/17 137/73    Weight from Last 3 Encounters:   01/13/17 81.6 kg (180 lb)              Today, you had the following     No orders found for display       Primary Care Provider    Physician No Ref-Primary       No address on file        Thank you!     Thank you for choosing EYE CLINIC  for your care. Our goal is always to provide you with excellent care. Hearing back from our patients is one way we can continue to improve our services. Please  take a few minutes to complete the written survey that you may receive in the mail after your visit with us. Thank you!             Your Updated Medication List - Protect others around you: Learn how to safely use, store and throw away your medicines at www.disposemymeds.org.          This list is accurate as of: 2/22/17 11:59 PM.  Always use your most recent med list.                   Brand Name Dispense Instructions for use    hypromellose 0.3 % Gel ophthalmic gel    GENTEAL    10 g    Place 0.5 g (0.5 inches) Into the left eye At Bedtime Apply approximately a half inch ribbon of ointment to the inside of your lower lids. Warning, application of the ointment to your eyes will cause temporary blurring of your vision from the ointment coating your eye. Please apply right before bedtime.       prednisoLONE acetate 1 % ophthalmic susp    PRED FORTE    10 mL    Place 1 drop Into the left eye 4 times daily

## 2017-02-22 NOTE — MR AVS SNAPSHOT
After Visit Summary   2/22/2017    Enrique Gabriel    MRN: [id number]           Patient Information     Date Of Birth          1985        Visit Information        Provider Department      2/22/2017 2:00 PM Jennifer Teresa MD Eye [facility]        Today's Diagnoses     Postoperative eye state    -  1    Dehiscence of closure of cornea, subsequent encounter           Follow-ups after your visit        Follow-up notes from your care team     Return in about 1 month (around 3/22/2017) for s/p OGR OS.      Who to contact     Please call your clinic at [phone] to:    Ask questions about your health    Make or cancel appointments    Discuss your medicines    Learn about your test results    Speak to your doctor   If you have compliments or concerns about an experience at your clinic, or if you wish to file a complaint, please contact [facility] Patient Relations at [phone] or email us at [email or web address]         Additional Information About Your Visit        Care EveryWhere ID     This is your Care EveryWhere ID. This could be used by other organizations to access your [place] medical records  [id number]         Blood Pressure from Last 3 Encounters:   01/14/17 137/73    Weight from Last 3 Encounters:   01/13/17 81.6 kg (180 lb)              Today, you had the following     No orders found for display       Primary Care Provider    Physician No Ref-Primary       No address on file        Thank you!     Thank you for choosing EYE CLINIC  for your care. Our goal is always to provide you with excellent care. Hearing back from our patients is one way we can continue to improve our services. Please take a few minutes to complete the written survey that you may receive in the mail after your visit with us. Thank you!             Your Updated Medication List - Protect others around you: Learn how to safely use, store and throw away your medicines  at www.disposemymeds.org.          This list is accurate as of: 2/22/17  3:53 PM.  Always use your most recent med list.                   Brand Name Dispense Instructions for use    hypromellose 0.3 % Gel ophthalmic gel    GENTEAL    10 g    Place 0.5 g (0.5 inches) Into the left eye At Bedtime Apply approximately a half inch ribbon of ointment to the inside of your lower lids. Warning, application of the ointment to your eyes will cause temporary blurring of your vision from the ointment coating your eye. Please apply right before bedtime.       prednisoLONE acetate 1 % ophthalmic susp    PRED FORTE    10 mL    Place 1 drop Into the left eye 4 times daily

## 2017-02-22 NOTE — PROGRESS NOTES
CC: Post op globe repair left eye    Interval History: Vision continues to improve (pin hole to 20/60). Continues to have some irritation from suture tails. Improves when using artificial tears/ointment. Using drops as below.     HPI: Enrique Gabriel is a 31 year old male POD#4 s/p globe repair, left eye. 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 Presented with dehiscence of cornea at limbus from 9 o'clock to 3'oclock with extremely hypotonous eye, vitreous in the anterior chamber, dislocated IOL, and vitreous hemorrhage. questionable Retinal detachment  . Underwent resuturing of cornea to limbus and limited peritomy without evidence of scleral laceration.    GTTs:  Completed Vigamox  Prednisolone QID OS    Past Ocular History  Keratoconus OU  Trauma OS with lens dislocation, left aphakic   PKP OD 11/28/2001  PKP OS  ? & for rejection 11/24/2003 + IOL & 1/15/2003  OGR OS subtotal vitrectomy suturing of dehisced corneal transplant 5/12/2003  OGR OS dehisced corneal transplant and lens subluxation 1/14/2017    Ocular history additionally notable for previous globe rupture in the same (left) eye years ago s/p repair. States left eye was his dominant eye. Previous cataract surgery done at [facility] in [place].  Working on getting outside records faxed in.    Assessment/plan:   1.  POM#1 s/p repair of globe rupture/corneal dehiscence (1/14/2017)   - corneal wound appears to be healing well   - IOP stable   - retina attached   - continue drops per cornea    Return to clinic: 2 months    Thang Odonnell MD PGY-3  Resident     ~~~~~~~~~~~~~~~~~~~~~~~~~~~~~~~~~~~~~~~~~~~~~~~~~~~~~~~~~~~~~~~~~~~~~~~~~~~~~~~~~~  Attending Physician Attestation:  Complete documentation of historical and exam elements from today's encounter can be found in the full encounter summary report (not reduplicated in this progress  note).  I personally obtained the chief complaint(s) and history of present illness.  I confirmed and edited as necessary the review of systems, past medical/surgical history, family history, social history, and examination findings as documented by others; and I examined the patient myself.  I personally reviewed the relevant tests, images, and reports as documented above and I agree with the interpretation as documented by the resident/fellow and edited by me.  I formulated and edited as necessary the assessment and plan and discussed the findings and management plan with the patient and family . In addition, when a procedure was performed, I was present for critical portions of the procedure and was immediately available for the entire procedure. - Jennifer Teresa M.D.

## 2017-02-26 NOTE — PROGRESS NOTES
CC: Post op globe repair left eye    HPI: Enrique Gabriel is a 31 year old male s/p globe repair, left eye. 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 Presented with dehiscence of cornea at limbus from 9 o'clock to 3'oclock with extremely hypotonous eye, vitreous in the anterior chamber, dislocated IOL, and vitreous hemorrhage. questionable Retinal detachment. Underwent resuturing of cornea to limbus and limited peritomy without evidence of scleral laceration.    Interval history: Patient has chronic irritation OS, but feels like he is tolerating it. No flashes, floaters, glare, halos, pain, tearing. Has redness.    POHx;  previous globe rupture in the same (left) eye years ago s/p repair.   Previous cataract surgery done at [facility] in [place].   Secondary IOL left eye in 2003.   H/o graft rejection left eye treated in 2011.    Gtts:  PF TID OS    Assessment/Plan:     1.  POM#1 s/p repair of globe rupture/corneal dehiscence left eye (1/14/17)   -corneal wound reapproximated (exposed knots and suture tails x 10) and Kerline negative   - removed half of sutures due to looseness or KNV   -intraocular pressure stable   -some vitreous in AC    -continue Prednisolone  Three times a day   -continue Vigamox BID   -continue Genteal as needed   -f/u Cornea 1 month    ~~~~~~~~~~~~~~~~~~~~~~~~~~~~~~~~~~~~~~~~~~~~~~~~~~~~~~~~~~~~~~~~    Complete documentation of historical and exam elements from today's encounter can be found in the full encounter summary report (not reduplicated in this progress note). I personally obtained the chief complaint(s) and history of present illness.  I confirmed and edited as necessary the review of systems, past medical/surgical history, family history, social history, and examination findings as documented by others; and I examined the patient myself. I personally reviewed the  relevant tests, images, and reports as documented above. I formulated and edited as necessary the assessment and plan and discussed the findings and management plan with the patient and family.    Jose G Hou MD

## 2017-03-22 ENCOUNTER — OFFICE VISIT (OUTPATIENT)
Dept: OPHTHALMOLOGY | Facility: CLINIC | Age: 32
End: 2017-03-22
Attending: OPHTHALMOLOGY
Payer: COMMERCIAL

## 2017-03-22 DIAGNOSIS — S05.32XD RUPTURED GLOBE OF LEFT EYE, SUBSEQUENT ENCOUNTER: ICD-10-CM

## 2017-03-22 DIAGNOSIS — T81.31XD: Primary | ICD-10-CM

## 2017-03-22 DIAGNOSIS — H43.12 VITREOUS HEMORRHAGE, LEFT EYE (H): ICD-10-CM

## 2017-03-22 PROCEDURE — 99212 OFFICE O/P EST SF 10 MIN: CPT | Mod: ZF

## 2017-03-22 ASSESSMENT — EXTERNAL EXAM - RIGHT EYE: OD_EXAM: NORMAL

## 2017-03-22 ASSESSMENT — REFRACTION_WEARINGRX
OD_SPHERE: -5.50
OD_AXIS: 132
OD_CYLINDER: +2.25
OS_CYLINDER: +2.25
OS_AXIS: 098
OS_SPHERE: -1.25

## 2017-03-22 ASSESSMENT — VISUAL ACUITY
OS_SC: 20/50
METHOD: SNELLEN - LINEAR
CORRECTION_TYPE: GLASSES
OD_CC: 20/20

## 2017-03-22 ASSESSMENT — SLIT LAMP EXAM - LIDS
COMMENTS: NORMAL
COMMENTS: PROTECTIVE PTOSIS

## 2017-03-22 ASSESSMENT — TONOMETRY
OS_IOP_MMHG: 20
IOP_METHOD: TONOPEN
OD_IOP_MMHG: 16

## 2017-03-22 ASSESSMENT — EXTERNAL EXAM - LEFT EYE: OS_EXAM: NORMAL

## 2017-03-22 NOTE — MR AVS SNAPSHOT
After Visit Summary   3/22/2017    Enrique Gabriel    MRN: [id number]           Patient Information     Date Of Birth          1985        Visit Information        Provider Department      3/22/2017 1:45 PM Jose G Hou MD [facility] Clinic        Today's Diagnoses     Dehiscence of closure of cornea, subsequent encounter    -  1    Ruptured globe of left eye, subsequent encounter        Vitreous hemorrhage, left eye (H)           Follow-ups after your visit        Your next 10 appointments already scheduled     Apr 26, 2017  1:15 PM CDT   RETURN CORNEA with Jose G Hou MD   Eye Clinic ([facility])    [address]  [address]  [address]  [facility] [id number] [phone]              Who to contact     Please call your clinic at [phone] to:    Ask questions about your health    Make or cancel appointments    Discuss your medicines    Learn about your test results    Speak to your doctor   If you have compliments or concerns about an experience at your clinic, or if you wish to file a complaint, please contact [facility] Patient Relations at [phone] or email us at [email or web address]         Additional Information About Your Visit        Care EveryWhere ID     This is your Care EveryWhere ID. This could be used by other organizations to access your [facility] medical records  [id number]         Blood Pressure from Last 3 Encounters:   01/14/17 137/73    Weight from Last 3 Encounters:   01/13/17 81.6 kg (180 lb)              Today, you had the following     No orders found for display       Primary Care Provider    Physician No Ref-Primary       No address on file        Thank you!     Thank you for choosing EYE CLINIC  for your care. Our goal is always to provide you with excellent care. Hearing back from our patients is one way we can continue to improve our services. Please take a few minutes to  complete the written survey that you may receive in the mail after your visit with us. Thank you!             Your Updated Medication List - Protect others around you: Learn how to safely use, store and throw away your medicines at www.disposemymeds.org.          This list is accurate as of: 3/22/17  3:01 PM.  Always use your most recent med list.                   Brand Name Dispense Instructions for use    hypromellose 0.3 % Gel ophthalmic gel    GENTEAL    10 g    Place 0.5 g (0.5 inches) Into the left eye At Bedtime Apply approximately a half inch ribbon of ointment to the inside of your lower lids. Warning, application of the ointment to your eyes will cause temporary blurring of your vision from the ointment coating your eye. Please apply right before bedtime.       prednisoLONE acetate 1 % ophthalmic susp    PRED FORTE    10 mL    Place 1 drop Into the left eye 4 times daily

## 2017-03-22 NOTE — PROGRESS NOTES
CC: Post op globe repair left eye    HPI: Enrique Gabriel is a 31 year old male s/p globe repair, left eye. 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 Presented with dehiscence of cornea at limbus from 9 o'clock to 3'oclock with extremely hypotonous eye, vitreous in the anterior chamber, dislocated IOL, and vitreous hemorrhage. questionable Retinal detachment. Underwent resuturing of cornea to limbus and limited peritomy without evidence of scleral laceration.    Interval history: Irritation significantly improved s/p removal of half of sutures last visit. Vision markedly improved as well. Still with some irritation/scratching with blink, but much better. No flashes, floaters, glare, halos, pain, tearing. Has redness.    POHx;  previous globe rupture in the same (left) eye years ago s/p repair.   Previous cataract surgery done at [facility] in [place].   Secondary IOL left eye in 2003.   H/o graft rejection left eye treated in 2011.    Gtts:  PF QID OS  Vigamox BID  PF QID OS    Assessment/Plan:     1.  POM#3 s/p repair of globe rupture/corneal dehiscence left eye (1/14/17)   -corneal wound reapproximated (exposed knots and suture tails x 10) and Kerline negative   - significantly improved  Irritation s/p removal of half of sutures due to looseness or KNV   - all remaining sutures removed today   -intraocular pressure stable   -some vitreous in AC    -continue Prednisolone- decrease to TID    -continue Vigamox QID x 1 week, then STOP   -continue Genteal as needed   -f/u Cornea 1 month with K topography and manifest refraction. Patient not interested in a contact lens to correct any irregular astigmatism , may potentially need repeat transplant as well as possible IOL repositioning/sutured IOL if desiring to correct version further       Arsen Gonzales MD  PGY3, Dept of  Ophthalmology    ~~~~~~~~~~~~~~~~~~~~~~~~~~~~~~~~~~~~~~~~~~~~~~~~~~~~~~~~~~~~~~~~    Complete documentation of historical and exam elements from today's encounter can be found in the full encounter summary report (not reduplicated in this progress note). I personally obtained the chief complaint(s) and history of present illness.  I confirmed and edited as necessary the review of systems, past medical/surgical history, family history, social history, and examination findings as documented by others; and I examined the patient myself. I personally reviewed the relevant tests, images, and reports as documented above. I formulated and edited as necessary the assessment and plan and discussed the findings and management plan with the patient and family.    Jose G Hou MD

## 2017-04-26 ENCOUNTER — OFFICE VISIT (OUTPATIENT)
Dept: OPHTHALMOLOGY | Facility: CLINIC | Age: 32
End: 2017-04-26
Attending: OPHTHALMOLOGY
Payer: COMMERCIAL

## 2017-04-26 DIAGNOSIS — T81.31XD: Primary | ICD-10-CM

## 2017-04-26 DIAGNOSIS — Z94.7 HISTORY OF CORNEA TRANSPLANT: ICD-10-CM

## 2017-04-26 DIAGNOSIS — S05.32XD RUPTURED GLOBE OF LEFT EYE, SUBSEQUENT ENCOUNTER: ICD-10-CM

## 2017-04-26 DIAGNOSIS — H43.12 VITREOUS HEMORRHAGE, LEFT EYE (H): ICD-10-CM

## 2017-04-26 PROCEDURE — 99213 OFFICE O/P EST LOW 20 MIN: CPT | Mod: ZF

## 2017-04-26 ASSESSMENT — VISUAL ACUITY
CORRECTION_TYPE: GLASSES
OD_CC: 20/20
METHOD: SNELLEN - LINEAR
OS_SC: 20/40
OD_CC+: -1
OS_SC+: -2

## 2017-04-26 ASSESSMENT — REFRACTION_MANIFEST
OS_AXIS: 123
OD_AXIS: 112
OS_CYLINDER: +2.25
OS_SPHERE: -0.25
OD_SPHERE: -6.75
OD_CYLINDER: +3.25

## 2017-04-26 ASSESSMENT — TONOMETRY
OD_IOP_MMHG: 12
OS_IOP_MMHG: 18
IOP_METHOD: TONOPEN

## 2017-04-26 ASSESSMENT — CONF VISUAL FIELD
OD_NORMAL: 1
OS_NORMAL: 1

## 2017-04-26 ASSESSMENT — EXTERNAL EXAM - RIGHT EYE: OD_EXAM: NORMAL

## 2017-04-26 ASSESSMENT — REFRACTION_WEARINGRX
OD_SPHERE: -6.50
OS_CYLINDER: +2.00
OS_AXIS: 098
OD_CYLINDER: +3.25
OS_SPHERE: -1.25
OD_AXIS: 125

## 2017-04-26 ASSESSMENT — EXTERNAL EXAM - LEFT EYE: OS_EXAM: NORMAL

## 2017-04-26 ASSESSMENT — SLIT LAMP EXAM - LIDS
COMMENTS: NORMAL
COMMENTS: PROTECTIVE PTOSIS

## 2017-04-26 NOTE — MR AVS SNAPSHOT
After Visit Summary   2017    Enrique Gabriel    MRN: [id number]           Patient Information     Date Of Birth          1985        Visit Information        Provider Department      2017 1:15 PM Jose G Hou MD [facility] Clinic         Follow-ups after your visit        Your next 10 appointments already scheduled     May 03, 2017 11:00 AM CDT   New Patient Visit with Ni Joseph OD   Eye Clinic ([facility])    [facility]  Clinic 9a  [address] [place] [id number]   [phone]            May 31, 2017  1:45 PM CDT   RETURN CORNEA with Jose G Hou MD   Eye Clinic ([facility])    [facility]  [address] [place] [address]  [facility] 9a  [facility] [id number] [phone]              Who to contact     Please call your clinic at [phone] to:    Ask questions about your health    Make or cancel appointments    Discuss your medicines    Learn about your test results    Speak to your doctor   If you have compliments or concerns about an experience at your clinic, or if you wish to file a complaint, please contact [facility] Patient Relations at [phone] or email us at [email or web address]         Additional Information About Your Visit        MyChart Information     Sibaritus is an electronic gateway that provides easy, online access to your medical records. With Sibaritus, you can request a clinic appointment, read your test results, renew a prescription or communicate with your care team.     To sign up for Vocationt visit the website at [email or web address]/"[email or web address]t   You will be asked to enter the access code listed below, as well as some personal information. Please follow the directions to create your username and password.     Your access code is: [email or web address]  Expires: 2017  3:50 PM     Your access code will  in 90 days. If you need help or a new code,  please contact your [facility] or call [phone] for assistance.        Care EveryWhere ID     This is your Care EveryWhere ID. This could be used by other organizations to access your [facility] medical records  [id number]         Blood Pressure from Last 3 Encounters:   01/14/17 137/73    Weight from Last 3 Encounters:   01/13/17 81.6 kg (180 lb)              Today, you had the following     No orders found for display       Primary Care Provider    Physician No Ref-Primary       No address on file        Thank you!     Thank you for choosing EYE CLINIC  for your care. Our goal is always to provide you with excellent care. Hearing back from our patients is one way we can continue to improve our services. Please take a few minutes to complete the written survey that you may receive in the mail after your visit with us. Thank you!             Your Updated Medication List - Protect others around you: Learn how to safely use, store and throw away your medicines at www.disposemymeds.org.          This list is accurate as of: 4/26/17  3:50 PM.  Always use your most recent med list.                   Brand Name Dispense Instructions for use    hypromellose 0.3 % Gel ophthalmic gel    GENTEAL    10 g    Place 0.5 g (0.5 inches) Into the left eye At Bedtime Apply approximately a half inch ribbon of ointment to the inside of your lower lids. Warning, application of the ointment to your eyes will cause temporary blurring of your vision from the ointment coating your eye. Please apply right before bedtime.       prednisoLONE acetate 1 % ophthalmic susp    PRED FORTE    10 mL    Place 1 drop Into the left eye 4 times daily

## 2017-04-26 NOTE — PROGRESS NOTES
CC: Post op globe repair left eye    HPI: Enrique Gabriel is a 31 year old male s/p globe repair, left eye. 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 Presented with dehiscence of cornea at limbus from 9 o'clock to 3'oclock with extremely hypotonous eye, vitreous in the anterior chamber, dislocated IOL, and vitreous hemorrhage. questionable Retinal detachment. Underwent resuturing of cornea to limbus and limited peritomy without evidence of scleral laceration.    Interval history: Irritation significantly improved s/p removal of half of sutures last visit. Vision markedly improved as well. Still with some irritation/scratching with blink, but much better. No flashes, floaters, glare, halos, pain, tearing. Has redness.    POHx;  previous globe rupture in the same (left) eye years ago s/p repair.   Previous cataract surgery done at [facility] in [place].   Secondary IOL left eye in 2003.   H/o graft rejection left eye treated in 2011.    Gtts:  PF QID OS  Vigamox BID  PF QID OS    Assessment/Plan:    1.  POM#3 s/p repair of globe rupture/corneal dehiscence left eye (1/14/17)   -corneal wound reapproximated (exposed knots and suture tails x 10) and Kerline negative   - significantly improved  Irritation s/p removal of sutures   -taper Prednisolone- decrease to BID    -continue Genteal as needed   -recommend RGP trial with Dr. Joseph to evaluate BCVA - if suboptimal or intolerant, would consider repeat PKP +/- reposition of IOL     2. AC vitreous prolapse OS   - monitor for now   - consider anterior vitrectomy if surgery planned for #1        ~~~~~~~~~~~~~~~~~~~~~~~~~~~~~~~~~~~~~~~~~~~~~~~~~~~~~~~~~~~~~~~~    Complete documentation of historical and exam elements from today's encounter can be found in the full encounter summary report (not reduplicated in this progress note). I personally obtained the  chief complaint(s) and history of present illness.  I confirmed and edited as necessary the review of systems, past medical/surgical history, family history, social history, and examination findings as documented by others; and I examined the patient myself. I personally reviewed the relevant tests, images, and reports as documented above. I formulated and edited as necessary the assessment and plan and discussed the findings and management plan with the patient and family.    Jose G Hou MD        I personally spent great than 40min with the patient, of which >50% of the time was spent face to face with the patient, counseling and coordinating care with the patient. We discussed the complexity of his diagnosis, the need for further information prior to proceeding with yet another surgery, and the unknown prognosis for the patient at this time.    Jose G Hou MD

## 2017-04-26 NOTE — NURSING NOTE
Chief Complaints and History of Present Illnesses   Patient presents with     Follow Up For     s/p Dehiscence of closure of cornea,      HPI    Affected eye(s):  Left   Symptoms:     Blurred vision   Decreased vision   No floaters   No flashes   No foreign body sensation   No Dryness   No photophobia      Duration:  4 weeks   Frequency:  Constant       Do you have eye pain now?:  No      Comments:  States va is the same since last visit   Prednisolone 4 times daily ASIM Valero  1:40 PM April 26, 2017

## 2017-05-03 ENCOUNTER — OFFICE VISIT (OUTPATIENT)
Dept: OPTOMETRY | Facility: CLINIC | Age: 32
End: 2017-05-03

## 2017-05-03 DIAGNOSIS — T85.22XS DISLOCATION OF INTRAOCULAR LENS, SEQUELA: ICD-10-CM

## 2017-05-03 DIAGNOSIS — Z94.7 POST CORNEAL TRANSPLANT: ICD-10-CM

## 2017-05-03 DIAGNOSIS — H52.212 IRREGULAR ASTIGMATISM, LEFT: Primary | ICD-10-CM

## 2017-05-03 ASSESSMENT — EXTERNAL EXAM - RIGHT EYE: OD_EXAM: NORMAL

## 2017-05-03 ASSESSMENT — SLIT LAMP EXAM - LIDS
COMMENTS: PROTECTIVE PTOSIS
COMMENTS: NORMAL

## 2017-05-03 ASSESSMENT — REFRACTION_CURRENTRX
OS_DIAMETER: 11.2
OS_BASECURVE: 43.00
OS_BRAND: ROSE K2 IC
OS_SPHERE: -2.00

## 2017-05-03 ASSESSMENT — VISUAL ACUITY
OD_CC: 20/20
OS_SC: 20/60-2
CORRECTION_TYPE: GLASSES
METHOD: SNELLEN - LINEAR

## 2017-05-03 ASSESSMENT — EXTERNAL EXAM - LEFT EYE: OS_EXAM: NORMAL

## 2017-05-03 NOTE — PROGRESS NOTES
A/P  1.) s/p globe rupture OS with irregular astigmatism and dislocated IOL  -RGP improves vision approximately 2 lines (20/60- to 20/40+1)  -Of note, overrefraction includes front surface cylinder, suggestive of blur from lenticular astig/dislocated IOL  -He is not interested in contact lens wear  -Reviewed findings with pt, no CL indicated at this point    2.) H/o Keratoconus, s/p PK  -Previously in soft lens OD, prefers glasses only now    Continue f/u with

## 2017-05-31 ENCOUNTER — OFFICE VISIT (OUTPATIENT)
Dept: OPHTHALMOLOGY | Facility: CLINIC | Age: 32
End: 2017-05-31
Attending: OPHTHALMOLOGY
Payer: COMMERCIAL

## 2017-05-31 DIAGNOSIS — T81.31XD: Primary | ICD-10-CM

## 2017-05-31 DIAGNOSIS — S05.32XD RUPTURED GLOBE OF LEFT EYE, SUBSEQUENT ENCOUNTER: ICD-10-CM

## 2017-05-31 PROCEDURE — 99212 OFFICE O/P EST SF 10 MIN: CPT | Mod: ZF

## 2017-05-31 ASSESSMENT — EXTERNAL EXAM - LEFT EYE: OS_EXAM: NORMAL

## 2017-05-31 ASSESSMENT — SLIT LAMP EXAM - LIDS
COMMENTS: PROTECTIVE PTOSIS
COMMENTS: NORMAL

## 2017-05-31 ASSESSMENT — VISUAL ACUITY
METHOD: SNELLEN - LINEAR
OD_CC: 20/20-
OS_SC: 20/60-
OS_PH_SC: 20/40+2

## 2017-05-31 ASSESSMENT — EXTERNAL EXAM - RIGHT EYE: OD_EXAM: NORMAL

## 2017-05-31 ASSESSMENT — TONOMETRY
OD_IOP_MMHG: 14
IOP_METHOD: ICARE
OS_IOP_MMHG: 15

## 2017-05-31 ASSESSMENT — CONF VISUAL FIELD
OD_NORMAL: 1
METHOD: COUNTING FINGERS
OS_NORMAL: 1

## 2017-05-31 NOTE — MR AVS SNAPSHOT
After Visit Summary   2017    Enrique Gabriel    MRN: [id number]           Patient Information     Date Of Birth          1985        Visit Information        Provider Department      2017 1:45 PM Jose G Hou MD [facility]        Today's Diagnoses     Dehiscence of closure of cornea, subsequent encounter - Left Eye    -  1    Ruptured globe of left eye, subsequent encounter - Left Eye           Follow-ups after your visit        Your next 10 appointments already scheduled     Aug 30, 2017 10:15 AM CDT   RETURN CORNEA with Jose G Hou MD   Eye Clinic ([facility])    [address]  [address] [facility]  9th [address] Clin 9a  [facility] [address]   [phone]              Who to contact     Please call your clinic at [phone] to:    Ask questions about your health    Make or cancel appointments    Discuss your medicines    Learn about your test results    Speak to your doctor   If you have compliments or concerns about an experience at your clinic, or if you wish to file a complaint, please contact [facility] Patient Relations at [phone] or email us at [email or web address]         Additional Information About Your Visit        MyChart Information     MoneyExpertt is an electronic gateway that provides easy, online access to your medical records. With Plaxica, you can request a clinic appointment, read your test results, renew a prescription or communicate with your care team.     To sign up for MoneyExpertt visit the website at [email or web address]/"[email or web address]"t   You will be asked to enter the access code listed below, as well as some personal information. Please follow the directions to create your username and password.     Your access code is: [email or web address]  Expires: 2017  3:50 PM     Your access code will  in 90 days. If you need help or a new code, please contact your [facility] or  call [phone] for assistance.        Care EveryWhere ID     This is your Care EveryWhere ID. This could be used by other organizations to access your [facility] medical records  [id number]         Blood Pressure from Last 3 Encounters:   01/14/17 137/73    Weight from Last 3 Encounters:   01/13/17 81.6 kg (180 lb)              Today, you had the following     No orders found for display       Primary Care Provider    Physician No Ref-Primary       No address on file        Thank you!     Thank you for choosing EYE CLINIC  for your care. Our goal is always to provide you with excellent care. Hearing back from our patients is one way we can continue to improve our services. Please take a few minutes to complete the written survey that you may receive in the mail after your visit with us. Thank you!             Your Updated Medication List - Protect others around you: Learn how to safely use, store and throw away your medicines at www.disposemymeds.org.          This list is accurate as of: 5/31/17  3:25 PM.  Always use your most recent med list.                   Brand Name Dispense Instructions for use    hypromellose 0.3 % Gel ophthalmic gel    GENTEAL    10 g    Place 0.5 g (0.5 inches) Into the left eye At Bedtime Apply approximately a half inch ribbon of ointment to the inside of your lower lids. Warning, application of the ointment to your eyes will cause temporary blurring of your vision from the ointment coating your eye. Please apply right before bedtime.       prednisoLONE acetate 1 % ophthalmic susp    PRED FORTE    10 mL    Place 1 drop Into the left eye 4 times daily

## 2017-05-31 NOTE — NURSING NOTE
Chief Complaints and History of Present Illnesses   Patient presents with     Keratoconus Follow Up     s/p repair of globe rupture/corneal dehiscence left eye (1/14/17)     HPI    Affected eye(s):  Left   Symptoms:     Blurred vision   No decreased vision   Foreign body sensation         Do you have eye pain now?:  No      Comments:  VA seems the same. No pain but LE still doesn't feel normal. Per pt contact exam with Jake didn't go well. Pred reddy TANG May 31, 2017 1:58 PM

## 2017-05-31 NOTE — PROGRESS NOTES
CC: Post op globe repair left eye    HPI: Enrique Gabriel is a 31 year old male s/p globe repair, left eye. 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 Presented with dehiscence of cornea at limbus from 9 o'clock to 3'oclock with extremely hypotonous eye, vitreous in the anterior chamber, dislocated IOL, and vitreous hemorrhage. questionable Retinal detachment. Underwent resuturing of cornea to limbus and limited peritomy without evidence of scleral laceration.    Interval history: doing well this past month. NO irritation. VIsion same. Saw Dr. FERRELL. RGP improves vision to 20/60 to 20/40. He is not interested in CL at this point.     POHx;  previous globe rupture in the same (left) eye years ago s/p repair.   Previous cataract surgery done at [facility] in [place].   Secondary IOL left eye in 2003.   H/o graft rejection left eye treated in 2011.    Gtts:  PF BID OS    Assessment/Plan:    1.  POM#4 s/p repair of globe rupture/corneal dehiscence left eye (1/14/17)   - corneal wound reapproximated (exposed knots and suture tails x 10)    - significantly improved  Irritation s/p removal of sutures   -continue to taper prednisolone to q day.     -continue Genteal as needed   -VA improved with RGP with Dr. Joseph, however, he does not want to wear CL at this point.    - continue to monitor at this point vs.  repeat PKP +/- reposition of IOL     2. AC vitreous prolapse OS   - monitor for now   - consider anterior vitrectomy if surgery planned for #1      Lennox Steiner MD  Ophthalmology resident, PGY-3    ~~~~~~~~~~~~~~~~~~~~~~~~~~~~~~~~~~~~~~~~~~~~~~~~~~~~~~~~~~~~~~~~    Complete documentation of historical and exam elements from today's encounter can be found in the full encounter summary report (not reduplicated in this progress note). I personally obtained the chief complaint(s) and history of present  illness.  I confirmed and edited as necessary the review of systems, past medical/surgical history, family history, social history, and examination findings as documented by others; and I examined the patient myself. I personally reviewed the relevant tests, images, and reports as documented above. I formulated and edited as necessary the assessment and plan and discussed the findings and management plan with the patient and family.    Jose G Hou MD

## 2017-08-30 ENCOUNTER — OFFICE VISIT (OUTPATIENT)
Dept: OPHTHALMOLOGY | Facility: CLINIC | Age: 32
End: 2017-08-30
Attending: OPHTHALMOLOGY
Payer: COMMERCIAL

## 2017-08-30 DIAGNOSIS — T86.8409 REJECTION OF CORNEA TRANSPLANT: ICD-10-CM

## 2017-08-30 DIAGNOSIS — T81.31XD: Primary | ICD-10-CM

## 2017-08-30 PROCEDURE — 99213 OFFICE O/P EST LOW 20 MIN: CPT | Mod: ZF

## 2017-08-30 RX ORDER — PREDNISOLONE ACETATE 10 MG/ML
1 SUSPENSION/ DROPS OPHTHALMIC 2 TIMES DAILY
Qty: 1 BOTTLE | Refills: 11 | Status: SHIPPED | OUTPATIENT
Start: 2017-08-30 | End: 2017-11-06 | Stop reason: DRUGHIGH

## 2017-08-30 RX ORDER — PREDNISOLONE ACETATE 10 MG/ML
1 SUSPENSION/ DROPS OPHTHALMIC 2 TIMES DAILY
COMMUNITY
End: 2017-08-30

## 2017-08-30 ASSESSMENT — CONF VISUAL FIELD
OS_NORMAL: 1
OD_NORMAL: 1

## 2017-08-30 ASSESSMENT — REFRACTION_WEARINGRX
OS_CYLINDER: +2.00
OD_AXIS: 125
OS_SPHERE: -1.25
OS_AXIS: 098
OD_SPHERE: -6.50
OD_CYLINDER: +3.25

## 2017-08-30 ASSESSMENT — EXTERNAL EXAM - LEFT EYE: OS_EXAM: NORMAL

## 2017-08-30 ASSESSMENT — VISUAL ACUITY
METHOD: SNELLEN - LINEAR
CORRECTION_TYPE: GLASSES
OS_SC+: -1
OD_CC: 20/20
OS_SC: 20/100
OS_PH_SC: 20/50+2

## 2017-08-30 ASSESSMENT — TONOMETRY
OD_IOP_MMHG: 17
OS_IOP_MMHG: 41
IOP_METHOD: TONOPEN

## 2017-08-30 ASSESSMENT — PACHYMETRY
OS_CT(UM): 660
OD_CT(UM): 596

## 2017-08-30 ASSESSMENT — SLIT LAMP EXAM - LIDS
COMMENTS: PROTECTIVE PTOSIS
COMMENTS: NORMAL

## 2017-08-30 ASSESSMENT — EXTERNAL EXAM - RIGHT EYE: OD_EXAM: NORMAL

## 2017-08-30 NOTE — NURSING NOTE
Chief Complaints and History of Present Illnesses   Patient presents with     Follow Up For     s/p repair of globe rupture/corneal dehiscence left eye (1/14/17)     HPI    Affected eye(s):  Left   Symptoms:     Decreased vision   No floaters   No flashes   No Dryness   No itching   No burning         Do you have eye pain now?:  No      Comments:  Follow up for s/p repair of globe rupture/corneal dehiscence left eye (1/14/17).    CLYDE Louis 10:38 AM 08/30/2017

## 2017-08-30 NOTE — PROGRESS NOTES
CC: Post op globe repair left eye    HPI: Enrique Gabriel is a 31 year old male s/p globe repair, left eye. 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 Presented with dehiscence of cornea at limbus from 9 o'clock to 3'oclock with extremely hypotonous eye, vitreous in the anterior chamber, dislocated IOL, and vitreous hemorrhage. questionable Retinal detachment. Underwent resuturing of cornea to limbus and limited peritomy without evidence of scleral laceration.    Interval history: doing well this past month. NO irritation. VIsion same. Saw Dr. FERRELL. RGP improves vision to 20/60 to 20/40. He is not interested in CL at this point.     POHx;  PKP for keratoconus OU 2002  Trauma to OS with ruptured glob 2003  Repeat PKP OS 2004  Previous cataract surgery done at [facility] in [place].   Secondary IOL left eye in 2003.   H/o graft rejection left eye treated in 2011.    Gtts:  Pred BID OS    Assessment/Plan:    1.  s/p repair of globe rupture/corneal dehiscence left eye (1/14/17)   - new AC reaction with rejection line vs. retrocorneal membrane from epi/fibrous ingrowth   -increase Pred to Q1hour   - if no improvement, may consider repeat PKP OS    -VA improved with RGP with Dr. Joseph in past, however, he does not want to wear CL at this point. Still functioning with glasses   - may  require repeat PKP after resolution of rejection +/- reposition of IOL     2. AC vitreous prolapse OS   - monitor for now   - consider anterior vitrectomy if surgery planned for #1        F/u 1 week    Robson Salvador, DO  Cornea Fellow      ~~~~~~~~~~~~~~~~~~~~~~~~~~~~~~~~~~~~~~~~~~~~~~~~~~~~~~~~~~~~~~~~    Complete documentation of historical and exam elements from today's encounter can be found in the full encounter summary report (not reduplicated in this progress note). I personally obtained the chief complaint(s) and  history of present illness.  I confirmed and edited as necessary the review of systems, past medical/surgical history, family history, social history, and examination findings as documented by others; and I examined the patient myself. I personally reviewed the relevant tests, images, and reports as documented above. I formulated and edited as necessary the assessment and plan and discussed the findings and management plan with the patient and family.    Jose G Hou MD    --------------------------------------------------------------------------------------------------------------------------------------------  Pachymetry - Interpretation & Report  Indication: corneal graft failure  Performed by: Robson Salvador DO  Reliability: good  Patient cooperation: good  Findings:   Right eye:  596 micrometers centrally    Left eye:  660 micrometers centrally   Interval Change, Assessment, & Impact on treatment:   Right eye:  baseline   Left eye:  K edema - early rejection   Signed: Jose G Hou 8/30/2017 1:36 PM

## 2017-08-30 NOTE — MR AVS SNAPSHOT
After Visit Summary   2017    Enrique Gabriel    MRN: [id number]           Patient Information     Date Of Birth          1985        Visit Information        Provider Department      2017 10:15 AM Jose G Hou MD [facility] Clinic        Today's Diagnoses     Dehiscence of closure of cornea, subsequent encounter - Left Eye    -  1    Rejection of cornea transplant - Left Eye           Follow-ups after your visit        Follow-up notes from your care team     Return in about 1 week (around 2017).      Your next 10 appointments already scheduled     Sep 06, 2017  1:00 PM CDT   RETURN CORNEA with Jose G Hou MD   Eye Clinic ([facility])    [address]  [address]  [facility] [id number] [phone]              Who to contact     Please call your clinic at [phone] to:    Ask questions about your health    Make or cancel appointments    Discuss your medicines    Learn about your test results    Speak to your doctor   If you have compliments or concerns about an experience at your clinic, or if you wish to file a complaint, please contact [facility] Patient Relations at [phone] or email us at [email or web address]         Additional Information About Your Visit        MyChart Information     Matchpoint is an electronic gateway that provides easy, online access to your medical records. With Matchpoint, you can request a clinic appointment, read your test results, renew a prescription or communicate with your care team.     To sign up for HumanCloudt visit the website at [email or web address]   You will be asked to enter the access code listed below, as well as some personal information. Please follow the directions to create your username and password.     Your access code is: [email or web address]  Expires: 2017  6:30 AM     Your access code will  in 90 days. If you need help or a new  code, please contact your [facility] or call [phone] for assistance.        Care EveryWhere ID     This is your Care EveryWhere ID. This could be used by other organizations to access your [place] medical records  [id number]         Blood Pressure from Last 3 Encounters:   01/14/17 137/73    Weight from Last 3 Encounters:   01/13/17 81.6 kg (180 lb)              Today, you had the following     No orders found for display         Today's Medication Changes          These changes are accurate as of: 8/30/17 12:07 PM.  If you have any questions, ask your nurse or doctor.               These medicines have changed or have updated prescriptions.        Dose/Directions    * prednisoLONE acetate 1 % ophthalmic susp   Commonly known as:  PRED FORTE   This may have changed:  Another medication with the same name was changed. Make sure you understand how and when to take each.   Used for:  Ruptured globe, left, initial encounter   Changed by:  Jennifer Teresa MD        Dose:  1 drop   Place 1 drop Into the left eye 4 times daily   Quantity:  10 mL   Refills:  1       * prednisoLONE acetate 1 % ophthalmic susp   Commonly known as:  PRED FORTE   This may have changed:  how to take this   Used for:  Rejection of cornea transplant   Changed by:  Jose G Hou MD        Dose:  1 drop   Place 1 drop into both eyes 2 times daily   Quantity:  1 Bottle   Refills:  11       * Notice:  This list has 2 medication(s) that are the same as other medications prescribed for you. Read the directions carefully, and ask your doctor or other care provider to review them with you.         Where to get your medicines      These medications were sent to [facility] [place] [address]  [address] , [facility] [address]     Phone:  [phone]     prednisoLONE acetate 1 % ophthalmic susp                Primary Care Provider    Physician No Ref-Primary        No address on file        Equal Access to Services     [facility] FABRICIOMARCIN : Hadii aad rubén estelle Sánchez, waethel luqjose, qalouie kadonna myrnaminal, curt idiin haysarahikamila danielsarahimanuel dickens. So [facility] [phone].    ATENCIÓN: Si habla español, tiene a shepherd disposición servicios gratuitos de asistencia lingüística. Yanciame al [phone].    We comply with applicable federal civil rights laws and [place] laws. We do not discriminate on the basis of race, color, national origin, age, disability sex, sexual orientation or gender identity.            Thank you!     Thank you for choosing EYE CLINIC  for your care. Our goal is always to provide you with excellent care. Hearing back from our patients is one way we can continue to improve our services. Please take a few minutes to complete the written survey that you may receive in the mail after your visit with us. Thank you!             Your Updated Medication List - Protect others around you: Learn how to safely use, store and throw away your medicines at www.disposemymeds.org.          This list is accurate as of: 8/30/17 12:07 PM.  Always use your most recent med list.                   Brand Name Dispense Instructions for use Diagnosis    hypromellose 0.3 % Gel ophthalmic gel    GENTEAL    10 g    Place 0.5 g (0.5 inches) Into the left eye At Bedtime Apply approximately a half inch ribbon of ointment to the inside of your lower lids. Warning, application of the ointment to your eyes will cause temporary blurring of your vision from the ointment coating your eye. Please apply right before bedtime.    Ruptured globe of left eye, initial encounter       * prednisoLONE acetate 1 % ophthalmic susp    PRED FORTE    10 mL    Place 1 drop Into the left eye 4 times daily    Ruptured globe, left, initial encounter       * prednisoLONE acetate 1 % ophthalmic susp    PRED FORTE    1 Bottle    Place 1 drop into both eyes 2 times daily    Rejection of cornea transplant       *  Notice:  This list has 2 medication(s) that are the same as other medications prescribed for you. Read the directions carefully, and ask your doctor or other care provider to review them with you.

## 2017-09-01 DIAGNOSIS — T81.31XA: Primary | ICD-10-CM

## 2017-09-05 ENCOUNTER — OFFICE VISIT (OUTPATIENT)
Dept: OPHTHALMOLOGY | Facility: CLINIC | Age: 32
End: 2017-09-05
Attending: OPHTHALMOLOGY
Payer: COMMERCIAL

## 2017-09-05 DIAGNOSIS — H40.052 BORDERLINE GLAUCOMA WITH OCULAR HYPERTENSION, LEFT: ICD-10-CM

## 2017-09-05 DIAGNOSIS — T86.8409 REJECTION OF CORNEA TRANSPLANT: Primary | ICD-10-CM

## 2017-09-05 DIAGNOSIS — S05.32XD RUPTURED GLOBE OF LEFT EYE, SUBSEQUENT ENCOUNTER: ICD-10-CM

## 2017-09-05 DIAGNOSIS — H43.12 VITREOUS HEMORRHAGE, LEFT EYE (H): ICD-10-CM

## 2017-09-05 DIAGNOSIS — Z94.7 HISTORY OF CORNEA TRANSPLANT: ICD-10-CM

## 2017-09-05 PROCEDURE — 99212 OFFICE O/P EST SF 10 MIN: CPT | Mod: ZF

## 2017-09-05 RX ORDER — BRIMONIDINE TARTRATE AND TIMOLOL MALEATE 2; 5 MG/ML; MG/ML
1 SOLUTION OPHTHALMIC 2 TIMES DAILY
Qty: 1 BOTTLE | Refills: 3 | Status: SHIPPED | OUTPATIENT
Start: 2017-09-05 | End: 2018-03-27

## 2017-09-05 ASSESSMENT — VISUAL ACUITY
CORRECTION_TYPE: GLASSES
OS_PH_CC: 20/60+
OS_CC: 20/125
METHOD: SNELLEN - LINEAR
OD_CC: 20/20-

## 2017-09-05 ASSESSMENT — CONF VISUAL FIELD
METHOD: COUNTING FINGERS
OS_NORMAL: 1
OD_NORMAL: 1

## 2017-09-05 ASSESSMENT — SLIT LAMP EXAM - LIDS
COMMENTS: NORMAL
COMMENTS: PROTECTIVE PTOSIS

## 2017-09-05 ASSESSMENT — EXTERNAL EXAM - LEFT EYE: OS_EXAM: NORMAL

## 2017-09-05 ASSESSMENT — EXTERNAL EXAM - RIGHT EYE: OD_EXAM: NORMAL

## 2017-09-05 ASSESSMENT — TONOMETRY
OS_IOP_MMHG: 38
IOP_METHOD: ICARE
OD_IOP_MMHG: 15

## 2017-09-05 NOTE — MR AVS SNAPSHOT
After Visit Summary   9/5/2017    Enrique Gabriel    MRN: [id number]           Patient Information     Date Of Birth          1985        Visit Information        Provider Department      9/5/2017 1:30 PM Mian Lujan MD Eye Clinic        Today's Diagnoses     Rejection of cornea transplant - Left Eye    -  1    Ruptured globe of left eye, subsequent encounter - Left Eye        Vitreous hemorrhage, left eye (H) - Left Eye        History of cornea transplant - Both Eyes        Borderline glaucoma with ocular hypertension, left           Follow-ups after your visit        Follow-up notes from your care team     Return in about 2 weeks (around 9/19/2017) for .      Your next 10 appointments already scheduled     Sep 20, 2017 10:45 AM CDT   RETURN CORNEA with Jose G Hou MD   Eye Clinic ([facility])    [address] [facility]  [address]  9[address] Clin [address]  [facility] [address] [phone]              Who to contact     Please call your clinic at [phone] to:    Ask questions about your health    Make or cancel appointments    Discuss your medicines    Learn about your test results    Speak to your doctor   If you have compliments or concerns about an experience at your clinic, or if you wish to file a complaint, please contact [facility] Patient Relations at [phone] or email us at [email or web address]         Additional Information About Your Visit        MyChart Information     MobGoldt is an electronic gateway that provides easy, online access to your medical records. With [employer], you can request a clinic appointment, read your test results, renew a prescription or communicate with your care team.     To sign up for MobGoldt visit the website at [email or web address]   You will be asked to enter the access code listed below, as well as some personal information. Please follow the directions to create  your username and password.     Your access code is: [email or web address]  Expires: 2017  6:30 AM     Your access code will  in 90 days. If you need help or a new code, please contact your [facility] Clinic or call [phone] for assistance.        Care EveryWhere ID     This is your Care EveryWhere ID. This could be used by other organizations to access your [place] medical records  [id number]         Blood Pressure from Last 3 Encounters:   17 137/73    Weight from Last 3 Encounters:   17 81.6 kg (180 lb)              Today, you had the following     No orders found for display         Today's Medication Changes          These changes are accurate as of: 17 11:59 PM.  If you have any questions, ask your nurse or doctor.               Start taking these medicines.        Dose/Directions    brimonidine-timolol 0.2-0.5 % ophthalmic solution   Commonly known as:  COMBIGAN   Used for:  Rejection of cornea transplant, History of cornea transplant, Ruptured globe of left eye, subsequent encounter, Borderline glaucoma with ocular hypertension, left   Started by:  Mian Lujan MD        Dose:  1 drop   Place 1 drop Into the left eye 2 times daily   Quantity:  1 Bottle   Refills:  3            Where to get your medicines      These medications were sent to [facility] [place] [address]  [address] , [facility] [address]     Phone:  [phone]     brimonidine-timolol 0.2-0.5 % ophthalmic solution                Primary Care Provider    Physician No Ref-Primary       No address on file        Equal Access to Services     [facility] JUANPABLO AH: Hadii aad ku hadasho Soomaali, waaxda luqadaha, qaybta kaalmada adeegyada, waxay garrick dickens. So [facility] [phone].    ATENCIÓN: Si habla español, tiene a shepherd disposición servicios gratuitos de asistencia lingüística. Llame al [phone].    We comply with applicable  federal civil rights laws and [place] laws. We do not discriminate on the basis of race, color, national origin, age, disability sex, sexual orientation or gender identity.            Thank you!     Thank you for choosing EYE CLINIC  for your care. Our goal is always to provide you with excellent care. Hearing back from our patients is one way we can continue to improve our services. Please take a few minutes to complete the written survey that you may receive in the mail after your visit with us. Thank you!             Your Updated Medication List - Protect others around you: Learn how to safely use, store and throw away your medicines at www.disposemymeds.org.          This list is accurate as of: 9/5/17 11:59 PM.  Always use your most recent med list.                   Brand Name Dispense Instructions for use Diagnosis    brimonidine-timolol 0.2-0.5 % ophthalmic solution    COMBIGAN    1 Bottle    Place 1 drop Into the left eye 2 times daily    Rejection of cornea transplant, History of cornea transplant, Ruptured globe of left eye, subsequent encounter, Borderline glaucoma with ocular hypertension, left       hypromellose 0.3 % Gel ophthalmic gel    GENTEAL    10 g    Place 0.5 g (0.5 inches) Into the left eye At Bedtime Apply approximately a half inch ribbon of ointment to the inside of your lower lids. Warning, application of the ointment to your eyes will cause temporary blurring of your vision from the ointment coating your eye. Please apply right before bedtime.    Ruptured globe of left eye, initial encounter       * prednisoLONE acetate 1 % ophthalmic susp    PRED FORTE    10 mL    Place 1 drop Into the left eye 4 times daily    Ruptured globe, left, initial encounter       * prednisoLONE acetate 1 % ophthalmic susp    PRED FORTE    1 Bottle    Place 1 drop into both eyes 2 times daily    Rejection of cornea transplant       * Notice:  This list has 2 medication(s) that are the same as other medications  prescribed for you. Read the directions carefully, and ask your doctor or other care provider to review them with you.

## 2017-09-05 NOTE — NURSING NOTE
Chief Complaints and History of Present Illnesses   Patient presents with     Eye Pain Left Eye     HPI    Affected eye(s):  Left   Symptoms:     No blurred vision (Comment: difficul to tell if there is any change as vision is poor LE to begin with)   No floaters   No flashes   Redness      Duration:  3 days   Frequency:  Constant       Do you have eye pain now?:  Yes   Location:  OS   Pain Level:  Mild Pain (2)   Pain Frequency:  Intermittent   Other:  pressure sensation and burning upon instillation of medication      Comments:  Pt here for eye pain in LE, started Sunday - started noticing discomfort, was worse yesterday and now today feels a little bit better.    Using Prednisolone once every hour LE  No AT's at this time    An Chau, COA 1:43 PM 09/05/2017

## 2017-09-05 NOTE — PROGRESS NOTES
CC: Post op globe repair left eye    HPI: Enrique Gabriel is a 31 year old male s/p globe repair, left eye. 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   Presented with dehiscence of cornea at limbus from 9 o'clock to 3'oclock with extremely hypotonous eye, vitreous in the anterior chamber, dislocated IOL, and vitreous hemorrhage. questionable Retinal detachment. Underwent resuturing of cornea to limbus and limited peritomy without evidence of scleral laceration.    Interval history:     Last week presented with Pressure OS, mild irritation and redness, slightly improved over last 1 day. Denies flashes, floaters, discharge.  Thought to have rejection vs. New retrocorneal membrane at visit with  last week      POHx;  PKP for keratoconus OU 2002  Trauma to OS with ruptured glob 2003  Repeat PKP OS 2004  Previous cataract surgery done at [facility] in [place].   Secondary IOL left eye in 2003.   H/o graft rejection left eye treated in 2011.    Gtts:  Pred q1hr OS    Assessment/Plan:    1.  s/p repair of globe rupture/corneal dehiscence left eye (1/14/17)     - longstanding retrocorneal linear opacity with debris - looks like partial Descemet's detachment post trauma with collection of pigment and debris.  Could be epi ingrowth but has not progressed significantly since noted in January.       - pachymetry 660 --> 647.  Endorses blurrier vision in the AM     - PH acuity 20/60     - decrease Pred to every two hours for 1 week, then 4x/day   - start combigan BID OS   - consider repeat PKP left eye vs DSEK      - VA improved with RGP with Dr. Joseph in past, however, he does not want to wear CL at this point. Still functioning with glasses     - recommend repeat PKP +/- reposition of IOL     2. AC vitreous prolapse OS   - monitor for now   - consider anterior vitrectomy if surgery planned for #1          F/u 2 weeks with Dr. Ameya Kumari, PRECIOUS  Cornea fellow      ~~~~~~~~~~~~~~~~~~~~~~~~~~~~~~~~~~~~~~~~~~~~~~~~~~~~~~~~~~~~~~~~    Complete documentation of historical and exam elements from today's encounter can be found in the full encounter summary report (not reduplicated in this progress note). I personally obtained the chief complaint(s) and history of present illness.  I confirmed and edited as necessary the review of systems, past medical/surgical history, family history, social history, and examination findings as documented by others.  I examined the patient myself, and I personally reviewed the relevant tests, images, and reports as documented above. I formulated and edited as necessary the assessment and plan and discussed the findings and management plan with the patient and family.     Mian Lujan MD, MA  Director, Cornea & Anterior Segment  [facility] Department of Ophthalmology & Visual Neuroscience

## 2017-09-15 ENCOUNTER — TELEPHONE (OUTPATIENT)
Dept: OPHTHALMOLOGY | Facility: CLINIC | Age: 32
End: 2017-09-15

## 2017-09-15 DIAGNOSIS — H40.052 BORDERLINE GLAUCOMA WITH OCULAR HYPERTENSION, LEFT: Primary | ICD-10-CM

## 2017-09-15 RX ORDER — LATANOPROST 50 UG/ML
1 SOLUTION/ DROPS OPHTHALMIC AT BEDTIME
Qty: 1 BOTTLE | Refills: 1 | Status: SHIPPED | OUTPATIENT
Start: 2017-09-15 | End: 2017-11-06

## 2017-09-15 RX ORDER — ACETAZOLAMIDE 500 MG/1
500 CAPSULE, EXTENDED RELEASE ORAL 2 TIMES DAILY
Qty: 14 CAPSULE | Refills: 0 | Status: SHIPPED | OUTPATIENT
Start: 2017-09-15 | End: 2017-09-20

## 2017-09-15 NOTE — TELEPHONE ENCOUNTER
----- Message from Pushpa Schultz sent at 9/15/2017  8:17 AM CDT -----  Regarding: Triage Call -   Contact: [phone]  The pt saw  Page 9.5.17 for increased pressure. He was started on a new med and everything has been fine until yesterday.When he woke up the eyes were very painful and there was pressure.  But today most of those symptoms have resided so he doesn't know if he should come in, increase meds?   Please call the pt on his cell at [phone].    Thanks - Pushpa    Please DO NOT send this message and/or reply back to sender.  Call Center Representatives DO NOT respond to messages.

## 2017-09-15 NOTE — TELEPHONE ENCOUNTER
Intraocular pressure left eye at local clinic this AM was 52mmHg and right eye 18 mmHg  Pt used combigan this AM before IOP    Will review with dr. Lujan this AM  No sulfa allergies  No kidney problems per pt    Mario Lee RN 10:19 AM 09/15/17

## 2017-09-15 NOTE — TELEPHONE ENCOUNTER
Reviewed with dr. Lujan  Pt to decrease prednisolone to 4/ day (reviewed again with dr. Salvador-- pt been using every 2 hours for one week and every hour prior, not 4/day yet)  Start latanoprost at night  Start diamox 500 mg 2/day  F/u with dr. gifford next week    Reviewed with pt and sent Rx's   Mario Lee RN 10:56 AM 09/15/17

## 2017-09-15 NOTE — TELEPHONE ENCOUNTER
----- Message from Tomasa Barba sent at 9/15/2017 12:04 PM CDT -----  Regarding: PHARMACY NEEDS APPROVAL FOR SUBSTITUTE FOR MEDICATION  Contact: [phone]  Nichole from [facility] called and stated they do not have acetaZOLAMIDE (DIAMOX SEQUELS) 500 MG 12 hr capsule in stock. They only have Sequels in tablet form and want to know if they can substitute that instead.    Please give pharmacy a call back at [phone]. They go to lunch from 12:30-1:00pm, but you can leave a message.    Thank you,    Tomasa  Call Center    Please DO NOT send message and or reply back to sender. Call center Representatives DO NOT respond to Messages.

## 2017-09-15 NOTE — TELEPHONE ENCOUNTER
Pt seen last week and had noted increase intraocular pressure in left eye and started on combigan  Last night new headache around left eye. No nausea/vomiting  This AM no headache pain  Pt lives 3 hours away  Reviewed if able to see local eye doctor for pressure check would be ideal  Pt states will call for appointment today and call direct number back with information on pressure  Mario Lee RN 8:54 AM 09/15/17    No new vision changes noted

## 2017-09-15 NOTE — TELEPHONE ENCOUNTER
Tablet form ok   Left message with pharmacy with direct number  Pt aware also  Mario Lee RN 12:38 PM 09/15/17

## 2017-09-20 ENCOUNTER — TELEPHONE (OUTPATIENT)
Dept: OPHTHALMOLOGY | Facility: CLINIC | Age: 32
End: 2017-09-20

## 2017-09-20 DIAGNOSIS — H40.052 BORDERLINE GLAUCOMA WITH OCULAR HYPERTENSION, LEFT: ICD-10-CM

## 2017-09-20 RX ORDER — ACETAZOLAMIDE 500 MG/1
500 CAPSULE, EXTENDED RELEASE ORAL 2 TIMES DAILY
Qty: 3 CAPSULE | Refills: 0 | Status: SHIPPED | OUTPATIENT
Start: 2017-09-20 | End: 2017-09-22

## 2017-09-20 NOTE — TELEPHONE ENCOUNTER
----- Message from An Bran sent at 9/20/2017  9:05 AM CDT -----  Regarding: Pt needing a call back about medication questions  Contact: [phone]  Pt calling asking for a call back about medications. Per pt he was supposed to see the Doctor but didn't know if he should continue all medications or if they are going to change. Pt is also wanting to know if there will be any changes in his Rx. prednisoLONE acetate.  Pt can be reached at [phone]    Thank You,  An    Please DO NOT send this message and/or reply back to sender.  Call Center Representatives DO NOT respond to messages.

## 2017-09-20 NOTE — TELEPHONE ENCOUNTER
Pt unable to make appt this AM-- rescheduled to Friday per cornea clinic  Pt will be out of diamox after this evening's dose  Rx for 3 tab sent for pt to continue until visit on Friday  Mario Lee RN 10:34 AM 09/20/17    Note to dr. Salvador for review

## 2017-09-21 DIAGNOSIS — T81.31XD: Primary | ICD-10-CM

## 2017-09-22 ENCOUNTER — OFFICE VISIT (OUTPATIENT)
Dept: OPHTHALMOLOGY | Facility: CLINIC | Age: 32
End: 2017-09-22
Attending: OPHTHALMOLOGY
Payer: COMMERCIAL

## 2017-09-22 DIAGNOSIS — H40.052 BORDERLINE GLAUCOMA WITH OCULAR HYPERTENSION, LEFT: ICD-10-CM

## 2017-09-22 DIAGNOSIS — T86.8409 REJECTION OF CORNEA TRANSPLANT: Primary | ICD-10-CM

## 2017-09-22 PROCEDURE — 99212 OFFICE O/P EST SF 10 MIN: CPT | Mod: ZF

## 2017-09-22 RX ORDER — ACETAZOLAMIDE 500 MG/1
500 CAPSULE, EXTENDED RELEASE ORAL 2 TIMES DAILY
Qty: 30 CAPSULE | Refills: 0 | Status: SHIPPED | OUTPATIENT
Start: 2017-09-22 | End: 2017-11-06

## 2017-09-22 ASSESSMENT — VISUAL ACUITY
METHOD: SNELLEN - LINEAR
OD_CC: 20/20
OD_CC+: -1
OS_SC: 20/125
CORRECTION_TYPE: GLASSES

## 2017-09-22 ASSESSMENT — TONOMETRY
OS_IOP_MMHG: 41
IOP_METHOD: TONOPEN
OD_IOP_MMHG: 17

## 2017-09-22 ASSESSMENT — CONF VISUAL FIELD
OS_NORMAL: 1
OD_NORMAL: 1

## 2017-09-22 ASSESSMENT — EXTERNAL EXAM - LEFT EYE: OS_EXAM: NORMAL

## 2017-09-22 ASSESSMENT — SLIT LAMP EXAM - LIDS
COMMENTS: NORMAL
COMMENTS: PROTECTIVE PTOSIS

## 2017-09-22 ASSESSMENT — PACHYMETRY
OD_CT(UM): 597
OS_CT(UM): 688

## 2017-09-22 ASSESSMENT — EXTERNAL EXAM - RIGHT EYE: OD_EXAM: NORMAL

## 2017-09-22 NOTE — NURSING NOTE
Chief Complaints and History of Present Illnesses   Patient presents with     Follow Up For     2 week f/u, s/p repair of globe rupture/corneal dehiscence left eye (1/14/17)     HPI    Affected eye(s):  Left   Symptoms:     Blurred vision   Redness         Do you have eye pain now?:  Yes   Location:  OS   Pain Level:  Mild Pain (3)      Comments:   2 week f/u, s/p repair of globe rupture/corneal dehiscence left eye (1/14/17). Pain/pressure is returning, not as bad as a week ago. Vision is a little blurrier OS.    Taking AcetaZOLAMIDE BID, Pred QID, Latanprost qhs, brimonidine BID       Kate Dillon COT 12:31 PM September 22, 2017

## 2017-09-22 NOTE — PROGRESS NOTES
CC: Post op globe repair left eye    HPI: Enrique Gabriel is a 31 year old male s/p globe repair, left eye. 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 Presented with dehiscence of cornea at limbus from 9 o'clock to 3'oclock with extremely hypotonous eye, vitreous in the anterior chamber, dislocated IOL, and vitreous hemorrhage. questionable Retinal detachment. Underwent resuturing of cornea to limbus and limited peritomy without evidence of scleral laceration.    Interval history:  Has felt increased pain and pressure in the left eye over the past few days.  Vision has declined during that time.      POHx;  PKP for keratoconus OU 2002  Trauma to OS with ruptured glob 2003  Repeat PKP OS 2004  Previous cataract surgery done at [facility] in [place].   Secondary IOL left eye in 2003.   H/o graft rejection left eye treated in 2011.    Gtts:  Pred QID OS  Latanoprost QHS OS  Combigan BID OS  Diamox 500 mg BID    Assessment/Plan:    1.  s/p repair of globe rupture/corneal dehiscence left eye (1/14/17)   - worsened AC reaction with rejection line vs. retrocorneal membrane from epi/fibrous ingrowth   -increase Pred to Q1hour   - if no improvement, may consider repeat PKP OS    -VA improved with RGP with Dr. Joseph in past, however, he does not want to wear CL at this point. Still functioning with glasses   - may  require repeat PKP after resolution of rejection +/- reposition of IOL     2. AC vitreous prolapse OS   - monitor for now   - consider anterior vitrectomy if surgery planned for #1      3. Ocular hypertension OS  On max med therapy with oral Diamox  ? Steroid response in past but IOP elevated prior to increased Pred  Control inflammation with hourly pred as above  May require oral Pred  Possibly related to epi/fibrous ingrowth  Consult with Glaucoma prior to repeat PKP    F/u 1 week with Cornea  and Glaucoma    Complete documentation of historical and exam elements from today's encounter can be found in the full encounter summary report (not reduplicated in this progress note). I personally obtained the chief complaint(s) and history of present illness.  I confirmed and edited as necessary the review of systems, past medical/surgical history, family history, social history, and examination findings as documented by others; and I examined the patient myself. I personally reviewed the relevant tests, images, and reports as documented above. I formulated and edited as necessary the assessment and plan and discussed the findings and management plan with the patient and family.     Robson Salvador, DO

## 2017-09-22 NOTE — MR AVS SNAPSHOT
After Visit Summary   9/22/2017    Enrique Gabriel    MRN: [id number]           Patient Information     Date Of Birth          1985        Visit Information        Provider Department      9/22/2017 1:15 PM Robson Salvador DO Eye Clinic        Today's Diagnoses     Rejection of cornea transplant - Left Eye    -  1    Borderline glaucoma with ocular hypertension, left - Left Eye        Borderline glaucoma with ocular hypertension, left           Follow-ups after your visit        Follow-up notes from your care team     Return in about 1 week (around 9/29/2017) for Follow Up.      Your next 10 appointments already scheduled     Oct 04, 2017 10:00 AM CDT   RETURN CORNEA with Jose G Hou MD   Eye Clinic ([facility])    [address]  [address]  [address]  [place] [address] [phone]              Future tests that were ordered for you today     Open Future Orders        Priority Expected Expires Ordered    Pachymetry OU (both eyes) Routine  3/24/2019 9/21/2017            Who to contact     Please call your clinic at [phone] to:    Ask questions about your health    Make or cancel appointments    Discuss your medicines    Learn about your test results    Speak to your doctor   If you have compliments or concerns about an experience at your clinic, or if you wish to file a complaint, please contact [facility] Patient Relations at [phone] or email us at [email or web address]         Additional Information About Your Visit        MyChart Information     MyChart gives you secure access to your electronic health record. If you see a primary care provider, you can also send messages to your care team and make appointments. If you have questions, please call your primary care clinic.  If you do not have a primary care provider, please call [phone] and they will assist you.      Judy is an  electronic gateway that provides easy, online access to your medical records. With [employer], you can request a clinic appointment, read your test results, renew a prescription or communicate with your care team.     To access your existing account, please contact your [facility] or call [phone] for assistance.        Care EveryWhere ID     This is your Care EveryWhere ID. This could be used by other organizations to access your [place] medical records  [id number]         Blood Pressure from Last 3 Encounters:   01/14/17 137/73    Weight from Last 3 Encounters:   01/13/17 81.6 kg (180 lb)              Today, you had the following     No orders found for display         Where to get your medicines      These medications were sent to [facility] - [place] [address]  [address] , [facility] [address]     Phone:  [phone]     acetaZOLAMIDE 500 MG 12 hr capsule          Primary Care Provider    None Specified       No primary provider on file.        Equal Access to Services     CHRISSY GERONIMO : Hadii efrain [place] hadasho Soomaali, waaxda luqadaha, qaybta kaalmada adeegyada, curt kennedy . So [facility] [phone].    ATENCIÓN: Si habla español, tiene a shepherd disposición servicios gratuitos de asistencia lingüística. Llame al [phone].    We comply with applicable federal civil rights laws and [place] laws. We do not discriminate on the basis of race, color, national origin, age, disability sex, sexual orientation or gender identity.            Thank you!     Thank you for choosing EYE CLINIC  for your care. Our goal is always to provide you with excellent care. Hearing back from our patients is one way we can continue to improve our services. Please take a few minutes to complete the written survey that you may receive in the mail after your visit with us. Thank you!             Your Updated Medication List - Protect  others around you: Learn how to safely use, store and throw away your medicines at www.disposemymeds.org.          This list is accurate as of: 9/22/17  1:42 PM.  Always use your most recent med list.                   Brand Name Dispense Instructions for use Diagnosis    acetaZOLAMIDE 500 MG 12 hr capsule    DIAMOX SEQUELS    30 capsule    Take 1 capsule (500 mg) by mouth 2 times daily    Borderline glaucoma with ocular hypertension, left       brimonidine-timolol 0.2-0.5 % ophthalmic solution    COMBIGAN    1 Bottle    Place 1 drop Into the left eye 2 times daily    Rejection of cornea transplant, History of cornea transplant, Ruptured globe of left eye, subsequent encounter, Borderline glaucoma with ocular hypertension, left       hypromellose 0.3 % Gel ophthalmic gel    GENTEAL    10 g    Place 0.5 g (0.5 inches) Into the left eye At Bedtime Apply approximately a half inch ribbon of ointment to the inside of your lower lids. Warning, application of the ointment to your eyes will cause temporary blurring of your vision from the ointment coating your eye. Please apply right before bedtime.    Ruptured globe of left eye, initial encounter       latanoprost 0.005 % ophthalmic solution    XALATAN    1 Bottle    Place 1 drop Into the left eye At Bedtime    Borderline glaucoma with ocular hypertension, left       * prednisoLONE acetate 1 % ophthalmic susp    PRED FORTE    10 mL    Place 1 drop Into the left eye 4 times daily    Ruptured globe, left, initial encounter       * prednisoLONE acetate 1 % ophthalmic susp    PRED FORTE    1 Bottle    Place 1 drop into both eyes 2 times daily    Rejection of cornea transplant       * Notice:  This list has 2 medication(s) that are the same as other medications prescribed for you. Read the directions carefully, and ask your doctor or other care provider to review them with you.

## 2017-09-26 ENCOUNTER — TELEPHONE (OUTPATIENT)
Dept: OPHTHALMOLOGY | Facility: CLINIC | Age: 32
End: 2017-09-26

## 2017-09-26 NOTE — TELEPHONE ENCOUNTER
Unable to get a hold of pt to discuss an appointment time to see Dr. Starr.  Odessa Acevedo COA 2:52 PM September 26, 2017

## 2017-09-26 NOTE — TELEPHONE ENCOUNTER
Spoke to dr. Salvador after he was out of OR this afternoon  Pt will need likely tube   Pt lives out of town  Dr. Salvador will speak to dr. Strar about plan of care tomorrow  Called pt to update process and no answer and unable to leave voiceswapna Lee RN 5:02 PM 09/26/17

## 2017-09-27 ENCOUNTER — TELEPHONE (OUTPATIENT)
Dept: OPHTHALMOLOGY | Facility: CLINIC | Age: 32
End: 2017-09-27

## 2017-09-27 NOTE — TELEPHONE ENCOUNTER
Spoke with patient regarding need for urgent appointment with Dr. Starr and likely need for glaucoma drainage valve.  He expressed understanding.  He plans to come for an appointment on Monday.

## 2017-10-02 ENCOUNTER — OFFICE VISIT (OUTPATIENT)
Dept: OPHTHALMOLOGY | Facility: CLINIC | Age: 32
End: 2017-10-02
Attending: OPHTHALMOLOGY
Payer: COMMERCIAL

## 2017-10-02 ENCOUNTER — TRANSFERRED RECORDS (OUTPATIENT)
Dept: HEALTH INFORMATION MANAGEMENT | Facility: CLINIC | Age: 32
End: 2017-10-02

## 2017-10-02 DIAGNOSIS — H40.32X0 GLAUCOMA ASSOCIATED WITH OCULAR TRAUMA OF LEFT EYE, UNSPECIFIED GLAUCOMA STAGE: ICD-10-CM

## 2017-10-02 DIAGNOSIS — H40.62X0 STEROID INDUCED GLAUCOMA, LEFT EYE: Primary | ICD-10-CM

## 2017-10-02 DIAGNOSIS — T38.0X5A STEROID INDUCED GLAUCOMA, LEFT EYE: Primary | ICD-10-CM

## 2017-10-02 DIAGNOSIS — H40.52X4: ICD-10-CM

## 2017-10-02 PROCEDURE — 99213 OFFICE O/P EST LOW 20 MIN: CPT | Mod: ZF

## 2017-10-02 ASSESSMENT — VISUAL ACUITY
CORRECTION_TYPE: GLASSES
OS_SC: 20/400
METHOD: SNELLEN - LINEAR
OD_CC: 20/20
OD_CC+: -1

## 2017-10-02 ASSESSMENT — TONOMETRY
OS_IOP_MMHG: 57
OS_IOP_MMHG: 46
OD_IOP_MMHG: 18
IOP_METHOD: APPLANATION
IOP_METHOD: APPLANATION

## 2017-10-02 ASSESSMENT — CONF VISUAL FIELD
OS_SUPERIOR_TEMPORAL_RESTRICTION: 3
OS_INFERIOR_NASAL_RESTRICTION: 3
OS_SUPERIOR_NASAL_RESTRICTION: 3
OS_INFERIOR_TEMPORAL_RESTRICTION: 3
METHOD: COUNTING FINGERS
OD_NORMAL: 1

## 2017-10-02 ASSESSMENT — SLIT LAMP EXAM - LIDS
COMMENTS: NORMAL
COMMENTS: PROTECTIVE PTOSIS

## 2017-10-02 ASSESSMENT — EXTERNAL EXAM - LEFT EYE: OS_EXAM: NORMAL

## 2017-10-02 ASSESSMENT — EXTERNAL EXAM - RIGHT EYE: OD_EXAM: NORMAL

## 2017-10-02 ASSESSMENT — CUP TO DISC RATIO
OD_RATIO: 0.4
OS_RATIO: 0.5

## 2017-10-02 NOTE — NURSING NOTE
Chief Complaints and History of Present Illnesses   Patient presents with     Follow Up For      Ocular hypertension OS. Hx of trauma to the LE.     HPI    Affected eye(s):  Left   Symptoms:     Blurred vision   Decreased vision         Do you have eye pain now?:  No      Comments:  OS is irritated. Scratchy feeling. Vision is not great.  Pt states he doesn't have a lot of pain in the LE in the mornings. Gets worse though-out the day after he has been taking his prednisone.  Odessa Acevedo COA 7:55 AM October 2, 2017

## 2017-10-02 NOTE — MR AVS SNAPSHOT
After Visit Summary   10/2/2017    Enrique Gabriel    MRN: [id number]           Patient Information     Date Of Birth          1985        Visit Information        Provider Department      10/2/2017 7:45 AM Yue Starr MD Eye Clinic        Today's Diagnoses     Steroid induced glaucoma, left eye    -  1    Glaucoma associated with ocular trauma of left eye, unspecified glaucoma stage        Glaucoma of left eye associated with ocular disorder, indeterminate stage           Follow-ups after your visit        Your next 10 appointments already scheduled     Oct 04, 2017  7:30 AM CDT   Post-Op with Yue Starr MD   Eye Clinic ([facility])    [address]  [address]  [address]  [facility] [address]   [phone]            Oct 04, 2017 10:00 AM CDT   RETURN CORNEA with Jose G Hou MD   Eye Clinic ([facility])    [address]  [address]  [address]  [facility] [address]   [phone]            Oct 11, 2017 12:15 PM CDT   Post-Op with Yue Starr MD   Eye Clinic ([facility])    [address]  [address]  [address]  [facility] [address]   [phone]            Oct 23, 2017 12:15 PM CDT   Post-Op with Yue Starr MD   Eye Clinic ([facility])    [address]  [address]  [address]  [facility] [address]   [phone]            Nov 06, 2017  8:00 AM CST   Post-Op with Yue Starr MD   Eye Clinic ([facility])    [address]  [address]  [address]  [facility] [address]   [phone]              Who to contact     Please call your clinic at [phone] to:    Ask questions about your health    Make or cancel appointments    Discuss your medicines    Learn about your test results    Speak to your doctor   If you have compliments or concerns about an experience at your clinic, or if you wish to file  a complaint, please contact [facility] Patient Relations at [phone] or email us at [email or web address]         Additional Information About Your Visit        SkySt[employer] Information     [employer] gives you secure access to your electronic health record. If you see a primary care provider, you can also send messages to your care team and make appointments. If you have questions, please call your primary care clinic.  If you do not have a primary care provider, please call [phone] and they will assist you.      [employer]Stone is an electronic gateway that provides easy, online access to your medical records. With [employer], you can request a clinic appointment, read your test results, renew a prescription or communicate with your care team.     To access your existing account, please contact your [facility] or call [phone] for assistance.        Care EveryWhere ID     This is your Care EveryWhere ID. This could be used by other organizations to access your [place] medical records  [id number]         Blood Pressure from Last 3 Encounters:   01/14/17 137/73    Weight from Last 3 Encounters:   01/13/17 81.6 kg (180 lb)              We Performed the Following     Marina-Operative Worksheet (Glaucoma)        Primary Care Provider Office Phone # Fax #    Torsten CORRINA Meehan [phone]       [facility] [address]  [facility] [address]        Equal Access to Services     CHRISSY GERONIMO : Hadii efrain [place] hadasho Soezioali, waaxda luqadaha, qaybta kaalmada adefabbyyada, curt dickens. So [facility] [phone].    ATENCIÓN: Si habla español, tiene a shepherd disposición servicios gratuitos de asistencia lingüística. Khanh al [phone].    We comply with applicable federal civil rights laws and [place] laws. We do not discriminate on the basis of race, color, national origin, age, disability, sex, sexual orientation, or  gender identity.            Thank you!     Thank you for choosing EYE CLINIC  for your care. Our goal is always to provide you with excellent care. Hearing back from our patients is one way we can continue to improve our services. Please take a few minutes to complete the written survey that you may receive in the mail after your visit with us. Thank you!             Your Updated Medication List - Protect others around you: Learn how to safely use, store and throw away your medicines at www.disposemymeds.org.          This list is accurate as of: 10/2/17  9:27 AM.  Always use your most recent med list.                   Brand Name Dispense Instructions for use Diagnosis    acetaZOLAMIDE 500 MG 12 hr capsule    DIAMOX SEQUELS    30 capsule    Take 1 capsule (500 mg) by mouth 2 times daily    Borderline glaucoma with ocular hypertension, left       brimonidine-timolol 0.2-0.5 % ophthalmic solution    COMBIGAN    1 Bottle    Place 1 drop Into the left eye 2 times daily    Rejection of cornea transplant, History of cornea transplant, Ruptured globe of left eye, subsequent encounter, Borderline glaucoma with ocular hypertension, left       hypromellose 0.3 % Gel ophthalmic gel    GENTEAL    10 g    Place 0.5 g (0.5 inches) Into the left eye At Bedtime Apply approximately a half inch ribbon of ointment to the inside of your lower lids. Warning, application of the ointment to your eyes will cause temporary blurring of your vision from the ointment coating your eye. Please apply right before bedtime.    Ruptured globe of left eye, initial encounter       latanoprost 0.005 % ophthalmic solution    XALATAN    1 Bottle    Place 1 drop Into the left eye At Bedtime    Borderline glaucoma with ocular hypertension, left       * prednisoLONE acetate 1 % ophthalmic susp    PRED FORTE    10 mL    Place 1 drop Into the left eye 4 times daily    Ruptured globe, left, initial encounter       * prednisoLONE acetate 1 % ophthalmic susp     PRED FORTE    1 Bottle    Place 1 drop into both eyes 2 times daily    Rejection of cornea transplant       * Notice:  This list has 2 medication(s) that are the same as other medications prescribed for you. Read the directions carefully, and ask your doctor or other care provider to review them with you.

## 2017-10-02 NOTE — PROGRESS NOTES
"CC: Uncontrolled intraocular pressure left eye    HPI: High intraocular pressure following globe repair and steroids for rejection, left eye. 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 Presented with dehiscence of cornea at limbus from 9 o'clock to 3'oclock with extremely hypotonous eye, vitreous in the anterior chamber, dislocated IOL, and vitreous hemorrhage. Underwent resuturing of cornea to limbus and limited peritomy without evidence of scleral laceration.    Interval history:  Patient reports since increase predforte medications to every hour in early August LEFT eye has begun to hurt. Pain is described as ached behind the eye \"like a migraine\". Vision has been poor over the last several months but appears stable. Patient would like to proceed with surgery 2/2 to pain and vision loss. No family history of Glaucoma increased IOP. Patient denies other medical problems including kidney problems, nephrolithiasis, lung problems, COPD, asthma. Of note, patient is a smoker 1/2 PPD. 15 years.     POHx;   PKP for keratoconus OU 2002  Trauma to OS with ruptured glob 2003  Repeat PKP OS 2004  Previous cataract surgery done at [facility] in [place].   Secondary IOL left eye in 2003.   H/o graft rejection left eye treated in 2011.    Gtts:  Pred every hour OS  Latanoprost QHS OS  Combigan BID OS  Diamox 500 mg BID    Assessment:    Glaucoma left eye, stage indeterminate  without visual field    On max med therapy with oral Diamox   Steroid responder   Possible epithelial ingrowth   Previous trauma   Extensive scarring at superior limbus   Loss of iris tissue from trauma   Intraocular lens slightly subluxed inferonasally    S/p repair of globe rupture/corneal dehiscence left eye (1/14/17)   worsened AC reaction with rejection line vs. retrocorneal membrane from epi/fibrous ingrowth   Cont Pred to Q1hour   VA " improved with RGP with Dr. Joseph in past, however, he does not want to wear CL at this point. Still functioning with glasses   may  require repeat PKP after resolution of rejection +/- reposition of IOL     AC vitreous prolapse OS   May need vitrectomy if tube clogs      Plan:  Ahmed with corneal patch graft left eye   Out patient  Block  Risks discussed     Caro Alejo MD  Ophthalmology PGY3     Attending Physician Attestation:  Complete documentation of historical and exam elements from today's encounter can be found in the full encounter summary report (not reduplicated in this progress note). I personally obtained the chief complaint(s) and history of present illness. I confirmed and edited asnecessary the review of systems, past medical/surgical history, family history, social history, and examination findings as documented by others; and I examined the patient myself. I personally reviewed the relevant tests, images, and reports as documented above. I formulated and edited as necessary the assessment and plan and discussed the findings and management plan with the patient and family.  - Yue Starr MD 8:34 AM 10/2/2017

## 2017-10-03 ENCOUNTER — TRANSFERRED RECORDS (OUTPATIENT)
Dept: HEALTH INFORMATION MANAGEMENT | Facility: CLINIC | Age: 32
End: 2017-10-03

## 2017-10-04 ENCOUNTER — OFFICE VISIT (OUTPATIENT)
Dept: OPHTHALMOLOGY | Facility: CLINIC | Age: 32
End: 2017-10-04
Attending: OPHTHALMOLOGY
Payer: COMMERCIAL

## 2017-10-04 DIAGNOSIS — Z98.890 POSTOPERATIVE EYE STATE: ICD-10-CM

## 2017-10-04 DIAGNOSIS — Z94.7 HISTORY OF CORNEA TRANSPLANT: Primary | ICD-10-CM

## 2017-10-04 DIAGNOSIS — H40.32X0 GLAUCOMA ASSOCIATED WITH OCULAR TRAUMA OF LEFT EYE, UNSPECIFIED GLAUCOMA STAGE: ICD-10-CM

## 2017-10-04 DIAGNOSIS — Z48.810 AFTERCARE FOLLOWING SURGERY OF A SENSORY ORGAN: Primary | ICD-10-CM

## 2017-10-04 PROCEDURE — 40000269 ZZH STATISTIC NO CHARGE FACILITY FEE: Mod: ZF

## 2017-10-04 PROCEDURE — 99211 OFF/OP EST MAY X REQ PHY/QHP: CPT | Mod: ZF,27

## 2017-10-04 PROCEDURE — 99213 OFFICE O/P EST LOW 20 MIN: CPT | Mod: ZF

## 2017-10-04 RX ORDER — OFLOXACIN 3 MG/ML
1 SOLUTION/ DROPS OPHTHALMIC 4 TIMES DAILY
Qty: 1 BOTTLE | Refills: 11 | Status: SHIPPED | OUTPATIENT
Start: 2017-10-04 | End: 2017-11-06

## 2017-10-04 ASSESSMENT — VISUAL ACUITY
METHOD: SNELLEN - LINEAR
CORRECTION_TYPE: GLASSES
OS_CC+: -1
OS_CC: 20/80
METHOD: SNELLEN - LINEAR
OS_CC+: -1
OD_CC+: -3
CORRECTION_TYPE: GLASSES
OD_CC: 20/20
OD_CC: 20/20
OS_PH_CC: 20/50
OD_CC+: -3
OS_PH_CC: 20/50
OS_CC: 20/80

## 2017-10-04 ASSESSMENT — REFRACTION_WEARINGRX
OD_SPHERE: -6.50
OD_AXIS: 125
OS_SPHERE: -1.25
OD_SPHERE: -6.50
OS_CYLINDER: +2.00
OS_CYLINDER: +2.00
OD_CYLINDER: +3.25
OS_SPHERE: -1.25
OS_AXIS: 098
OD_AXIS: 125
OD_CYLINDER: +3.25
OS_AXIS: 098

## 2017-10-04 ASSESSMENT — SLIT LAMP EXAM - LIDS
COMMENTS: PROTECTIVE PTOSIS
COMMENTS: NORMAL
COMMENTS: NORMAL
COMMENTS: PROTECTIVE PTOSIS

## 2017-10-04 ASSESSMENT — TONOMETRY
IOP_METHOD: TONOPEN
OS_IOP_MMHG: 9
OD_IOP_MMHG: 16
IOP_METHOD: TONOPEN
OS_IOP_MMHG: 06
IOP_METHOD: ICARE
OD_IOP_MMHG: 16
OS_IOP_MMHG: 06
IOP_METHOD: ICARE
OS_IOP_MMHG: --
IOP_METHOD: APPLANATION
OS_IOP_MMHG: --

## 2017-10-04 ASSESSMENT — CONF VISUAL FIELD
OS_SUPERIOR_TEMPORAL_RESTRICTION: 3
METHOD: COUNTING FINGERS
OD_NORMAL: 1
OS_SUPERIOR_TEMPORAL_RESTRICTION: 3
OD_NORMAL: 1
METHOD: COUNTING FINGERS

## 2017-10-04 ASSESSMENT — CUP TO DISC RATIO
OS_RATIO: 0.35
OD_RATIO: 0.4

## 2017-10-04 ASSESSMENT — EXTERNAL EXAM - LEFT EYE
OS_EXAM: NORMAL
OS_EXAM: NORMAL

## 2017-10-04 ASSESSMENT — EXTERNAL EXAM - RIGHT EYE
OD_EXAM: NORMAL
OD_EXAM: NORMAL

## 2017-10-04 NOTE — NURSING NOTE
Chief Complaints and History of Present Illnesses   Patient presents with     Post Op (Ophthalmology) Left Eye     POD #1 s/p Ahmed with corneal patch graft left eye 10/03/2017     HPI    Affected eye(s):  Left   Symptoms:     No floaters   No flashes   No redness   No Dryness         Do you have eye pain now?:  No      Comments:  Pt slept well last night. No eye pain today, just soreness in LE. No discharge.    Jamar FISCHER October 4, 2017 7:32 AM

## 2017-10-04 NOTE — PROGRESS NOTES
CC: Postoperative day x1 Ahmed tube with corneal patch graft LEFT eye 10/3/17    HPI: High intraocular pressure following globe repair and steroids for rejection, left eye. 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 Presented with dehiscence of cornea at limbus from 9 o'clock to 3'oclock with extremely hypotonous eye, vitreous in the anterior chamber, dislocated IOL, and vitreous hemorrhage. Underwent resuturing of cornea to limbus and limited peritomy without evidence of scleral laceration.    Interval history: Patient reports mild soreness in left eye, well controlled with tylenol. Vision has improved since surgery. Wore patch overnight Left eye without issues.       POHx;   PKP for keratoconus OU 2002  Trauma to OS with ruptured glob 2003  Repeat PKP OS 2004  Previous cataract surgery done at [facility] in [place].   Secondary IOL left eye in 2003.   H/o graft rejection left eye treated in 2011.    Gtts:  Pred every hour OS      Assessment:    Glaucoma left eye, stage indeterminate  without visual field    Postoperative day x1 Ahmed tube placement with corneal patch graft- doing well   Steroid responder   Possible epithelial ingrowth   Previous trauma   Extensive scarring at superior limbus      Plan:    -Stop Diamox    -Stop all IOP lowering gtts including combigan, latanoprost.     -Postoperative instructions and sheet given including no bending, no lifting more than 10 pounds    -RV 1 week     -Will coordinate steroid drop frequency with cornea       S/p repair of globe rupture/corneal dehiscence left eye (1/14/17)   worsened AC reaction with rejection line vs. retrocorneal membrane from epi/fibrous ingrowth   Cont Pred to Q1hour   VA improved with RGP with Dr. Joseph in past, however, he does not want to wear CL at this point. Still functioning with glasses   Plan:    -may  require repeat PKP  after resolution of rejection +/- reposition of IOL     AC vitreous prolapse OS   Plan:    -May need vitrectomy if tube clogs         Caro Alejo MD  Ophthalmology PGY3    Attending Physician Attestation:  Complete documentation of historical and exam elements from today's encounter can be found in the full encounter summary report (not reduplicated in this progress note). I personally obtained the chief complaint(s) and history of present illness. I confirmed and edited asnecessary the review of systems, past medical/surgical history, family history, social history, and examination findings as documented by others; and I examined the patient myself. I personally reviewed the relevant tests, images, and reports as documented above. I formulated and edited as necessary the assessment and plan and discussed the findings and management plan with the patient and family.  - Yue Starr MD 8:42 AM 10/4/2017

## 2017-10-04 NOTE — MR AVS SNAPSHOT
After Visit Summary   10/4/2017    Enrique Gabriel    MRN: [id number]           Patient Information     Date Of Birth          1985        Visit Information        Provider Department      10/4/2017 7:30 AM Yue Starr MD Eye Clinic         Follow-ups after your visit        Your next 10 appointments already scheduled     Oct 04, 2017 10:00 AM CDT   RETURN CORNEA with Jose G Hou MD   Eye Clinic ([facility])    [address]  [address]  [address] Clin [address]  [facility] [address]   [phone]            Oct 11, 2017 12:15 PM CDT   Post-Op with Yue Starr MD   Eye Clinic ([facility])    [address]  [address]  [address]  [facility] [address]   [phone]            Oct 23, 2017 12:15 PM CDT   Post-Op with Yue Starr MD   Eye Clinic ([facility])    [address]  [address]   [address] Clin [address]  [facility] [address]   [phone]            Nov 06, 2017  8:00 AM CST   Post-Op with Yue Starr MD   Eye Clinic ([facility])    [address]  [address]  [address] [address]  [facility] [address] [phone]              Who to contact     Please call your clinic at [phone] to:    Ask questions about your health    Make or cancel appointments    Discuss your medicines    Learn about your test results    Speak to your doctor   If you have compliments or concerns about an experience at your clinic, or if you wish to file a complaint, please contact [facility] Patient Relations at [phone] or email us at [email or web address]         Additional Information About Your Visit        MyChart Information     MyChart gives you secure access to your electronic health record. If you see a primary care provider, you can also send messages to your care team and make appointments. If you have questions, please call your primary  [facility] clinic.  If you do not have a primary care provider, please call [phone] and they will assist you.      [employer] is an electronic gateway that provides easy, online access to your medical records. With [employer], you can request a clinic appointment, read your test results, renew a prescription or communicate with your care team.     To access your existing account, please contact your [facility] or call [phone] for assistance.        Care EveryWhere ID     This is your Care EveryWhere ID. This could be used by other organizations to access your [place] medical records  [id number]         Blood Pressure from Last 3 Encounters:   01/14/17 137/73    Weight from Last 3 Encounters:   01/13/17 81.6 kg (180 lb)              Today, you had the following     No orders found for display       Primary Care Provider Office Phone # Fax #    Torsten Meehan [phone]       [facility] [address]  [facility] [address]        Equal Access to Services     CHRISSY GERONIMO AH: Hadii aad ku hadasho Soomaali, waaxda luqadaha, qaybta kaalmada adeegyada, waxay idiin hayaan ignacia kennedy . So [facility] [phone].    ATENCIÓN: Si habla español, tiene a shepherd disposición servicios gratuitos de asistencia lingüística. Llame al [phone].    We comply with applicable federal civil rights laws and [place] laws. We do not discriminate on the basis of race, color, national origin, age, disability, sex, sexual orientation, or gender identity.            Thank you!     Thank you for choosing EYE CLINIC  for your care. Our goal is always to provide you with excellent care. Hearing back from our patients is one way we can continue to improve our services. Please take a few minutes to complete the written survey that you may receive in the mail after your visit with us. Thank you!             Your Updated Medication List - Protect others around you: Learn how to safely use,  store and throw away your medicines at www.disposemymeds.org.          This list is accurate as of: 10/4/17  8:40 AM.  Always use your most recent med list.                   Brand Name Dispense Instructions for use Diagnosis    acetaZOLAMIDE 500 MG 12 hr capsule    DIAMOX SEQUELS    30 capsule    Take 1 capsule (500 mg) by mouth 2 times daily    Borderline glaucoma with ocular hypertension, left       brimonidine-timolol 0.2-0.5 % ophthalmic solution    COMBIGAN    1 Bottle    Place 1 drop Into the left eye 2 times daily    Rejection of cornea transplant, History of cornea transplant, Ruptured globe of left eye, subsequent encounter, Borderline glaucoma with ocular hypertension, left       hypromellose 0.3 % Gel ophthalmic gel    GENTEAL    10 g    Place 0.5 g (0.5 inches) Into the left eye At Bedtime Apply approximately a half inch ribbon of ointment to the inside of your lower lids. Warning, application of the ointment to your eyes will cause temporary blurring of your vision from the ointment coating your eye. Please apply right before bedtime.    Ruptured globe of left eye, initial encounter       latanoprost 0.005 % ophthalmic solution    XALATAN    1 Bottle    Place 1 drop Into the left eye At Bedtime    Borderline glaucoma with ocular hypertension, left       * prednisoLONE acetate 1 % ophthalmic susp    PRED FORTE    10 mL    Place 1 drop Into the left eye 4 times daily    Ruptured globe, left, initial encounter       * prednisoLONE acetate 1 % ophthalmic susp    PRED FORTE    1 Bottle    Place 1 drop into both eyes 2 times daily    Rejection of cornea transplant       * Notice:  This list has 2 medication(s) that are the same as other medications prescribed for you. Read the directions carefully, and ask your doctor or other care provider to review them with you.

## 2017-10-04 NOTE — PROGRESS NOTES
CC: Post op globe repair left eye    HPI: Enrique Gabriel is a 31 year old male s/p globe repair, left eye. 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 Presented with dehiscence of cornea at limbus from 9 o'clock to 3'oclock with extremely hypotonous eye, vitreous in the anterior chamber, dislocated IOL, and vitreous hemorrhage. questionable Retinal detachment. Underwent resuturing of cornea to limbus and limited peritomy without evidence of scleral laceration.    Interval history:  He underwent left Ahmed tube with Dr. Starr on 10/3/17. Patient states vision is improved. Pain is improved. No flashes, floaters, diplopia.    POHx;  PKP for keratoconus OU 2002  Trauma to OS with ruptured glob 2003  Repeat PKP OS 2004  Previous cataract surgery done at [facility] in [place].   Secondary IOL left eye in 2003.   H/o graft rejection left eye treated in 2011.  Ahmed tube OS 10/3/17    Gtts:  Pred once every hour, OS - held  Prednisolonce once every other day, OD    Assessment/Plan:    1.  s/p repair of globe rupture/corneal dehiscence left eye (1/14/17)   -start Prednisolone QID OS and Ofloxacin QID OS   -graft much clearer today   -VA improved with RGP with Dr. Joseph in past, however, he does not want to wear CL at this point. Still functioning with glasses   - may require repeat PKP +/- reposition of IOL after IOP stable   - would monitor for now     2. AC vitreous prolapse OS   - monitor for now   - consider anterior vitrectomy if surgery  planned for #1      3. Ocular hypertension OS   POD #1 s/p left Ahmed valve   IOP 6 today   Continue to follow-up with Dr. Starr    Return to clinic in 1-2 months     Mian Vincent MD  Ophthalmology, PGY-3    ~~~~~~~~~~~~~~~~~~~~~~~~~~~~~~~~~~~~~~~~~~~~~~~~~~~~~~~~~~~~~~~~    Complete documentation of historical and exam elements from today's encounter can be  found in the full encounter summary report (not reduplicated in this progress note). I personally obtained the chief complaint(s) and history of present illness.  I confirmed and edited as necessary the review of systems, past medical/surgical history, family history, social history, and examination findings as documented by others; and I examined the patient myself. I personally reviewed the relevant tests, images, and reports as documented above. I formulated and edited as necessary the assessment and plan and discussed the findings and management plan with the patient and family.    Jose G Hou MD

## 2017-10-04 NOTE — NURSING NOTE
Chief Complaints and History of Present Illnesses   Patient presents with     Follow Up For     POD #1 s/p Ahmed with corneal patch graft left eye 10/03/2017     HPI    Affected eye(s):  Left   Symptoms:     No floaters   No flashes   No redness   No Dryness         Do you have eye pain now?:  No      Comments:  Pt slept well last night. No eye pain today, just soreness in LE.    Jamar FISCHER October 4, 2017 7:33 AM

## 2017-10-04 NOTE — MR AVS SNAPSHOT
After Visit Summary   10/4/2017    Enrique Gabriel    MRN: [id number]           Patient Information     Date Of Birth          1985        Visit Information        Provider Department      10/4/2017 10:00 AM Jose G Hou MD Eye Clinic        Today's Diagnoses     History of cornea transplant - Both Eyes    -  1    Glaucoma associated with ocular trauma of left eye, unspecified glaucoma stage        Postoperative eye state           Follow-ups after your visit        Follow-up notes from your care team     Return in about 1 month (around 11/4/2017).      Your next 10 appointments already scheduled     Oct 04, 2017 10:00 AM CDT   RETURN CORNEA with Jose G Hou MD   Eye Clinic ([facility])    [address]  [address]  [address]  [facility] [address]   [phone]            Oct 11, 2017 12:15 PM CDT   Post-Op with Yue Starr MD   Eye Clinic ([facility])    [address]  [address]  [address]  [facility] [address]   [phone]            Oct 23, 2017 12:15 PM CDT   Post-Op with Yue Starr MD   Eye Clinic ([facility])    [address]  [address]  [address]  [facility] [address]   [phone]            Nov 06, 2017  8:00 AM CST   Post-Op with Yue Starr MD   Eye Clinic ([facility])    [address]  [address]  [address] Clin [address]a  [facility] [address]   [phone]            Nov 06, 2017  9:15 AM CST   RETURN CORNEA with Jose G Hou MD   Eye Clinic ([facility])    [address]  [address]  [address] Clin [address]  [facility] [address]   [phone]              Who to contact     Please call your clinic at [phone] to:    Ask questions about your health    Make or cancel appointments    Discuss your medicines    Learn about your test results    Speak to your doctor   If you have compliments or concerns  about an experience at your clinic, or if you wish to file a complaint, please contact [facility] Patient Relations at [phone] or email us at [email or web address]         Additional Information About Your Visit        [employer] Information     [employer] gives you secure access to your electronic health record. If you see a primary care provider, you can also send messages to your care team and make appointments. If you have questions, please call your primary care clinic.  If you do not have a primary care provider, please call [phone] and they will assist you.      [employer] is an electronic gateway that provides easy, online access to your medical records. With [employer], you can request a clinic appointment, read your test results, renew a prescription or communicate with your care team.     To access your existing account, please contact your [facility] or call [phone] for assistance.        Care EveryWhere ID     This is your Care EveryWhere ID. This could be used by other organizations to access your [place] medical records  [id number]         Blood Pressure from Last 3 Encounters:   01/14/17 137/73    Weight from Last 3 Encounters:   01/13/17 81.6 kg (180 lb)              Today, you had the following     No orders found for display         Today's Medication Changes          These changes are accurate as of: 10/4/17  9:39 AM.  If you have any questions, ask your nurse or doctor.               Start taking these medicines.        Dose/Directions    ofloxacin 0.3 % ophthalmic solution   Commonly known as:  OCUFLOX   Used for:  Postoperative eye state   Started by:  Jose G Hou MD        Dose:  1 drop   Place 1 drop Into the left eye 4 times daily   Quantity:  1 Bottle   Refills:  11            Where to get your medicines      These medications were sent to [facility] [place], [place] [facility] [address] [facility] [address]     Phone:  [phone]     ofloxacin 0.3 % ophthalmic solution                Primary Care Provider Office Phone # Fax #    Torsten Meehan [phone]       [facility] [address]  [facility] [address]        Equal Access to Services     CHRISSY GERONIMO : Hadii aad ku hadasho Soomaali, waaxda luqadaha, qaybta kaalmada adeegyada, curt mccauley haysarahin ignacia danielsarahimanuel dickens. So [facility] [phone].    ATENCIÓN: Si habla español, tiene a shepherd disposición servicios gratuitos de asistencia lingüística. Khanh al [phone].    We comply with applicable federal civil rights laws and [place] laws. We do not discriminate on the basis of race, color, national origin, age, disability, sex, sexual orientation, or gender identity.            Thank you!     Thank you for choosing EYE CLINIC  for your care. Our goal is always to provide you with excellent care. Hearing back from our patients is one way we can continue to improve our services. Please take a few minutes to complete the written survey that you may receive in the mail after your visit with us. Thank you!             Your Updated Medication List - Protect others around you: Learn how to safely use, store and throw away your medicines at www.disposemymeds.org.          This list is accurate as of: 10/4/17  9:39 AM.  Always use your most recent med list.                   Brand Name Dispense Instructions for use Diagnosis    acetaZOLAMIDE 500 MG 12 hr capsule    DIAMOX SEQUELS    30 capsule    Take 1 capsule (500 mg) by mouth 2 times daily    Borderline glaucoma with ocular hypertension, left       brimonidine-timolol 0.2-0.5 % ophthalmic solution    COMBIGAN    1 Bottle    Place 1 drop Into the left eye 2 times daily    Rejection of cornea transplant, History of cornea transplant, Ruptured globe of left eye, subsequent encounter, Borderline glaucoma with ocular hypertension, left       hypromellose 0.3 % Gel  ophthalmic gel    GENTEAL    10 g    Place 0.5 g (0.5 inches) Into the left eye At Bedtime Apply approximately a half inch ribbon of ointment to the inside of your lower lids. Warning, application of the ointment to your eyes will cause temporary blurring of your vision from the ointment coating your eye. Please apply right before bedtime.    Ruptured globe of left eye, initial encounter       latanoprost 0.005 % ophthalmic solution    XALATAN    1 Bottle    Place 1 drop Into the left eye At Bedtime    Borderline glaucoma with ocular hypertension, left       ofloxacin 0.3 % ophthalmic solution    OCUFLOX    1 Bottle    Place 1 drop Into the left eye 4 times daily    Postoperative eye state       * prednisoLONE acetate 1 % ophthalmic susp    PRED FORTE    10 mL    Place 1 drop Into the left eye 4 times daily    Ruptured globe, left, initial encounter       * prednisoLONE acetate 1 % ophthalmic susp    PRED FORTE    1 Bottle    Place 1 drop into both eyes 2 times daily    Rejection of cornea transplant       * Notice:  This list has 2 medication(s) that are the same as other medications prescribed for you. Read the directions carefully, and ask your doctor or other care provider to review them with you.

## 2017-10-11 ENCOUNTER — OFFICE VISIT (OUTPATIENT)
Dept: OPHTHALMOLOGY | Facility: CLINIC | Age: 32
End: 2017-10-11
Attending: OPHTHALMOLOGY
Payer: COMMERCIAL

## 2017-10-11 DIAGNOSIS — Z48.810 AFTERCARE FOLLOWING SURGERY OF A SENSORY ORGAN: Primary | ICD-10-CM

## 2017-10-11 PROCEDURE — 99212 OFFICE O/P EST SF 10 MIN: CPT | Mod: ZF

## 2017-10-11 ASSESSMENT — VISUAL ACUITY
METHOD: SNELLEN - LINEAR
OS_CC+: -1
OS_CC: 20/60
OS_PH_CC: 20/30-3
OD_CC: 20/20

## 2017-10-11 ASSESSMENT — TONOMETRY
OD_IOP_MMHG: 17
IOP_METHOD: APPLANATION
OS_IOP_MMHG: 10

## 2017-10-11 ASSESSMENT — CUP TO DISC RATIO
OD_RATIO: 0.3
OS_RATIO: 0.3

## 2017-10-11 ASSESSMENT — EXTERNAL EXAM - LEFT EYE: OS_EXAM: NORMAL

## 2017-10-11 ASSESSMENT — SLIT LAMP EXAM - LIDS
COMMENTS: PROTECTIVE PTOSIS
COMMENTS: NORMAL

## 2017-10-11 ASSESSMENT — EXTERNAL EXAM - RIGHT EYE: OD_EXAM: NORMAL

## 2017-10-11 NOTE — NURSING NOTE
Chief Complaints and History of Present Illnesses   Patient presents with     Post Op (Ophthalmology) Left Eye     S/P Ahmed Tube with Corneal Patch 1 week      HPI    Last Eye Exam:  10/4/17   Affected eye(s):  Left   Symptoms:     No floaters   No flashes      Duration:  1 week   Frequency:  Constant       Do you have eye pain now?:  Yes   Location:  OS   Other:  Sore with eye movement. IBF or Tylenol is helpful somewhat    Pain Frequency:  Intermittent      Comments:  Pt complains of soreness with eye movement. Vision is stable. No problems using just Pred Forte QID LE, last drop used this morning around 1045 AM. Denies any floaters or flashing lights. GLADYS CHRISTINE, COA 12:03 PM 10/11/2017

## 2017-10-11 NOTE — PROGRESS NOTES
CC: Postoperative week x1 Ahmed tube with corneal patch graft LEFT eye 10/3/17    HPI: High intraocular pressure following globe repair and steroids for rejection, left eye. 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 Presented with dehiscence of cornea at limbus from 9 o'clock to 3'oclock with extremely hypotonous eye, vitreous in the anterior chamber, dislocated IOL, and vitreous hemorrhage. Underwent resuturing of cornea to limbus and limited peritomy without evidence of scleral laceration.    Interval history:   Mild left eye soreness still, however slowly improving. Vision has been stable since last visit. Vision is always worse in AM and discomfort worse in am.     POHx;   PKP for keratoconus OU 2002  Trauma to OS with ruptured glob 2003  Repeat PKP OS 2004  Previous cataract surgery done at [facility] in [place].   Secondary IOL left eye in 2003.   H/o graft rejection left eye treated in 2011.    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     -Will continue to monitor off therapy.     -Stop ofloxacin drops    -Continue prednisolone four times a day left eye.    -return to clinic 2 weeks as scheduled for intraocular pressure check      S/p repair of globe rupture/corneal dehiscence left eye (1/14/17)   worsened AC reaction with rejection line vs. retrocorneal membrane from epi/fibrous ingrowth   Cont Pred to Q1hour   VA improved with RGP with Dr. Joseph in past, however, he does not want to wear CL at this point. Still functioning with  glasses   Plan:    -may  require repeat PKP after resolution of rejection +/- reposition of IOL     AC vitreous prolapse OS   Plan:    -May need vitrectomy if tube clogs         Caro Alejo MD  Ophthalmology PGY3    Attending Physician Attestation:  Complete documentation of historical and exam elements from today's encounter can be found in the full encounter summary report (not reduplicated in this progress note). I personally obtained the chief complaint(s) and history of present illness. I confirmed and edited asnecessary the review of systems, past medical/surgical history, family history, social history, and examination findings as documented by others; and I examined the patient myself. I personally reviewed the relevant tests, images, and reports as documented above. I formulated and edited as necessary the assessment and plan and discussed the findings and management plan with the patient and family.  - Yue Starr MD 12:51 PM 10/11/2017

## 2017-10-11 NOTE — MR AVS SNAPSHOT
After Visit Summary   10/11/2017    Enrique Gabriel    MRN: [id number]           Patient Information     Date Of Birth          1985        Visit Information        Provider Department      10/11/2017 12:15 PM Yue Starr MD [facility]        Today's Diagnoses     Aftercare following surgery of a sensory organ    -  1       Follow-ups after your visit        Your next 10 appointments already scheduled     Oct 23, 2017 12:15 PM CDT   Post-Op with Yue Starr MD   Eye Clinic ([facility])    [address]  [address]   [address] [address]  [facility] [address]   [phone]            Nov 06, 2017  8:00 AM CST   Post-Op with Yue Starr MD   Eye Clinic ([facility])    [address]  [address]   [address] [address]  [facility] [address]   [phone]            Nov 06, 2017  9:15 AM CST   RETURN CORNEA with Jose G Hou MD   Eye Clinic ([facility])    [address]  [address]   [address] [address]  [facility] [address]   [phone]              Who to contact     Please call your clinic at [phone] to:    Ask questions about your health    Make or cancel appointments    Discuss your medicines    Learn about your test results    Speak to your doctor   If you have compliments or concerns about an experience at your clinic, or if you wish to file a complaint, please contact [facility] Patient Relations at [phone] or email us at [email or web address]         Additional Information About Your Visit        MyChart Information     [employer] gives you secure access to your electronic health record. If you see a primary care provider, you can also send messages to your care team and make appointments. If you have questions, please call your primary care clinic.  If you do not have a primary care provider, please call [phone] and they will assist you.      [employer] is  an electronic gateway that provides easy, online access to your medical records. With [employer], you can request a clinic appointment, read your test results, renew a prescription or communicate with your care team.     To access your existing account, please contact your [facility] or call [phone] for assistance.        Care EveryWhere ID     This is your Care EveryWhere ID. This could be used by other organizations to access your [place] medical records  [id number]         Blood Pressure from Last 3 Encounters:   01/14/17 137/73    Weight from Last 3 Encounters:   01/13/17 81.6 kg (180 lb)              Today, you had the following     No orders found for display       Primary Care Provider Office Phone # Fax #    Torsten Meehan [phone]       [facility] [address]  [facility] [address]        Equal Access to Services     CHRISSY GERONIMO : Hadii aad ku hadasho Soedward, waaxda luqadaha, qaybta kaalmada adefabbyyada, curt kennedy . So [facility] [phone].    ATENCIÓN: Si habla español, tiene a shepherd disposición servicios gratuitos de asistencia lingüística. Khanh al [phone].    We comply with applicable federal civil rights laws and [place] laws. We do not discriminate on the basis of race, color, national origin, age, disability, sex, sexual orientation, or gender identity.            Thank you!     Thank you for choosing EYE CLINIC  for your care. Our goal is always to provide you with excellent care. Hearing back from our patients is one way we can continue to improve our services. Please take a few minutes to complete the written survey that you may receive in the mail after your visit with us. Thank you!             Your Updated Medication List - Protect others around you: Learn how to safely use, store and throw away your medicines at www.disposemymeds.org.          This list is accurate as of: 10/11/17 12:52 PM.   Always use your most recent med list.                   Brand Name Dispense Instructions for use Diagnosis    acetaZOLAMIDE 500 MG 12 hr capsule    DIAMOX SEQUELS    30 capsule    Take 1 capsule (500 mg) by mouth 2 times daily    Borderline glaucoma with ocular hypertension, left       brimonidine-timolol 0.2-0.5 % ophthalmic solution    COMBIGAN    1 Bottle    Place 1 drop Into the left eye 2 times daily    Rejection of cornea transplant, History of cornea transplant, Ruptured globe of left eye, subsequent encounter, Borderline glaucoma with ocular hypertension, left       hypromellose 0.3 % Gel ophthalmic gel    GENTEAL    10 g    Place 0.5 g (0.5 inches) Into the left eye At Bedtime Apply approximately a half inch ribbon of ointment to the inside of your lower lids. Warning, application of the ointment to your eyes will cause temporary blurring of your vision from the ointment coating your eye. Please apply right before bedtime.    Ruptured globe of left eye, initial encounter       latanoprost 0.005 % ophthalmic solution    XALATAN    1 Bottle    Place 1 drop Into the left eye At Bedtime    Borderline glaucoma with ocular hypertension, left       ofloxacin 0.3 % ophthalmic solution    OCUFLOX    1 Bottle    Place 1 drop Into the left eye 4 times daily    Postoperative eye state       * prednisoLONE acetate 1 % ophthalmic susp    PRED FORTE    10 mL    Place 1 drop Into the left eye 4 times daily    Ruptured globe, left, initial encounter       * prednisoLONE acetate 1 % ophthalmic susp    PRED FORTE    1 Bottle    Place 1 drop into both eyes 2 times daily    Rejection of cornea transplant       * Notice:  This list has 2 medication(s) that are the same as other medications prescribed for you. Read the directions carefully, and ask your doctor or other care provider to review them with you.

## 2017-10-23 ENCOUNTER — OFFICE VISIT (OUTPATIENT)
Dept: OPHTHALMOLOGY | Facility: CLINIC | Age: 32
End: 2017-10-23
Attending: OPHTHALMOLOGY
Payer: COMMERCIAL

## 2017-10-23 DIAGNOSIS — Z48.810 AFTERCARE FOLLOWING SURGERY OF A SENSORY ORGAN: Primary | ICD-10-CM

## 2017-10-23 PROCEDURE — 99213 OFFICE O/P EST LOW 20 MIN: CPT | Mod: ZF

## 2017-10-23 ASSESSMENT — REFRACTION_WEARINGRX
OD_SPHERE: -6.50
OS_SPHERE: -1.25
OS_CYLINDER: +2.00
OD_CYLINDER: +3.25
OD_AXIS: 125
OS_AXIS: 098

## 2017-10-23 ASSESSMENT — VISUAL ACUITY
OD_CC: 20/20
METHOD: SNELLEN - LINEAR
OS_PH_CC: 20/40
CORRECTION_TYPE: GLASSES
OS_CC: 20/125
OD_CC+: -1

## 2017-10-23 ASSESSMENT — EXTERNAL EXAM - LEFT EYE: OS_EXAM: NORMAL

## 2017-10-23 ASSESSMENT — CONF VISUAL FIELD
OS_NORMAL: 1
OD_NORMAL: 1
METHOD: COUNTING FINGERS

## 2017-10-23 ASSESSMENT — CUP TO DISC RATIO
OS_RATIO: 0.3
OD_RATIO: 0.3

## 2017-10-23 ASSESSMENT — EXTERNAL EXAM - RIGHT EYE: OD_EXAM: NORMAL

## 2017-10-23 ASSESSMENT — SLIT LAMP EXAM - LIDS
COMMENTS: NORMAL
COMMENTS: PROTECTIVE PTOSIS

## 2017-10-23 ASSESSMENT — TONOMETRY
OS_IOP_MMHG: 29
OD_IOP_MMHG: 18
IOP_METHOD: APPLANATION

## 2017-10-23 NOTE — MR AVS SNAPSHOT
After Visit Summary   10/23/2017    Enrique Gabriel    MRN: [id number]           Patient Information     Date Of Birth          1985        Visit Information        Provider Department      10/23/2017 12:15 PM Yue Starr MD [facility]        Today's Diagnoses     Aftercare following surgery of a sensory organ    -  1       Follow-ups after your visit        Your next 10 appointments already scheduled     Oct 23, 2017 12:15 PM CDT   Post-Op with Yue Starr MD   Eye Clinic ([facility])    [address]  [address]   [address] [address]  [facility] [address]   [phone]            Nov 06, 2017  8:00 AM CST   Post-Op with Yue Starr MD   Eye Clinic ([facility])    [address]  [address]   [address] [address]  [facility] [address]   [phone]            Nov 06, 2017  9:15 AM CST   RETURN CORNEA with Jose G Hou MD   Eye Clinic ([facility])    [address]  [address]   [address] [address]  [facility] [address]   [phone]              Who to contact     Please call your clinic at [phone] to:    Ask questions about your health    Make or cancel appointments    Discuss your medicines    Learn about your test results    Speak to your doctor   If you have compliments or concerns about an experience at your clinic, or if you wish to file a complaint, please contact [facility] Patient Relations at [phone] or email us at [email or web address]         Additional Information About Your Visit        MyChart Information     [employer] gives you secure access to your electronic health record. If you see a primary care provider, you can also send messages to your care team and make appointments. If you have questions, please call your primary care clinic.  If you do not have a primary care provider, please call [phone] and they will assist you.      [employer] is  an electronic gateway that provides easy, online access to your medical records. With [employer], you can request a clinic appointment, read your test results, renew a prescription or communicate with your care team.     To access your existing account, please contact your [facility] or call [phone] for assistance.        Care EveryWhere ID     This is your Care EveryWhere ID. This could be used by other organizations to access your [place] medical records  [id number]         Blood Pressure from Last 3 Encounters:   01/14/17 137/73    Weight from Last 3 Encounters:   01/13/17 81.6 kg (180 lb)              Today, you had the following     No orders found for display       Primary Care Provider Office Phone # Fax #    Torsten Meehan [phone]       [facility] [address]  [facility] [address]        Equal Access to Services     CHRISSY GERONIMO : Hadii aad ku hadasho Soedward, waaxda luqadaha, qaybta kaalmada adefabbyyada, curt kennedy . So [facility] [phone].    ATENCIÓN: Si habla español, tiene a shepherd disposición servicios gratuitos de asistencia lingüística. Khanh al [phone].    We comply with applicable federal civil rights laws and [place] laws. We do not discriminate on the basis of race, color, national origin, age, disability, sex, sexual orientation, or gender identity.            Thank you!     Thank you for choosing EYE CLINIC  for your care. Our goal is always to provide you with excellent care. Hearing back from our patients is one way we can continue to improve our services. Please take a few minutes to complete the written survey that you may receive in the mail after your visit with us. Thank you!             Your Updated Medication List - Protect others around you: Learn how to safely use, store and throw away your medicines at www.disposemymeds.org.          This list is accurate as of: 10/23/17 11:56 AM.   Always use your most recent med list.                   Brand Name Dispense Instructions for use Diagnosis    acetaZOLAMIDE 500 MG 12 hr capsule    DIAMOX SEQUELS    30 capsule    Take 1 capsule (500 mg) by mouth 2 times daily    Borderline glaucoma with ocular hypertension, left       brimonidine-timolol 0.2-0.5 % ophthalmic solution    COMBIGAN    1 Bottle    Place 1 drop Into the left eye 2 times daily    Rejection of cornea transplant, History of cornea transplant, Ruptured globe of left eye, subsequent encounter, Borderline glaucoma with ocular hypertension, left       hypromellose 0.3 % Gel ophthalmic gel    GENTEAL    10 g    Place 0.5 g (0.5 inches) Into the left eye At Bedtime Apply approximately a half inch ribbon of ointment to the inside of your lower lids. Warning, application of the ointment to your eyes will cause temporary blurring of your vision from the ointment coating your eye. Please apply right before bedtime.    Ruptured globe of left eye, initial encounter       latanoprost 0.005 % ophthalmic solution    XALATAN    1 Bottle    Place 1 drop Into the left eye At Bedtime    Borderline glaucoma with ocular hypertension, left       ofloxacin 0.3 % ophthalmic solution    OCUFLOX    1 Bottle    Place 1 drop Into the left eye 4 times daily    Postoperative eye state       * prednisoLONE acetate 1 % ophthalmic susp    PRED FORTE    10 mL    Place 1 drop Into the left eye 4 times daily    Ruptured globe, left, initial encounter       * prednisoLONE acetate 1 % ophthalmic susp    PRED FORTE    1 Bottle    Place 1 drop into both eyes 2 times daily    Rejection of cornea transplant       * Notice:  This list has 2 medication(s) that are the same as other medications prescribed for you. Read the directions carefully, and ask your doctor or other care provider to review them with you.

## 2017-10-23 NOTE — NURSING NOTE
Chief Complaints and History of Present Illnesses   Patient presents with     Post Op (Ophthalmology) Left Eye     Ahmed tube with corneal patch graft LEFT eye 10/3/17     HPI    Affected eye(s):  Left   Symptoms:        Frequency:  Constant       Do you have eye pain now?:  No      Comments:  Feels that the va has decreased  No red watery or dry  No F&F  Stephanie Arboleda COT 11:37 AM October 23, 2017

## 2017-10-23 NOTE — PROGRESS NOTES
CC: Postoperative week 3 Ahmed tube with corneal patch graft LEFT eye 10/3/17    HPI: High intraocular pressure following globe repair and steroids for rejection, left eye. 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 Presented with dehiscence of cornea at limbus from 9 o'clock to 3'oclock with extremely hypotonous eye, vitreous in the anterior chamber, dislocated IOL, and vitreous hemorrhage. Underwent resuturing of cornea to limbus and limited peritomy without evidence of scleral laceration.    Interval history:   Mild left eye soreness still, however slowly improving. Vision seems worse last couple of days    Past ocular history    PKP for keratoconus OU 2002  Trauma to OS with ruptured glob 2003  Repeat PKP OS 2004  Previous cataract surgery done at [facility] in [place].   Secondary IOL left eye in 2003.   H/o graft rejection left eye treated in 2011.  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    -return to clinic 2 weeks as scheduled for intraocular pressure check      S/p repair of globe rupture/corneal dehiscence left eye (1/14/17)   worsened AC reaction with rejection line vs. retrocorneal membrane from epi/fibrous ingrowth   Cont Pred to Q1hour   VA improved with RGP with Dr. Joseph in past, however, he does not want to wear CL at this point. Still functioning with glasses   May   require repeat PKP after resolution of rejection +/- reposition of IOL     AC vitreous prolapse OS   May need vitrectomy if tube clogs       Attending Physician Attestation:  Complete documentation of historical and exam elements from today's encounter can be found in the full encounter summary report (not reduplicated in this progress note). I personally obtained the chief complaint(s) and history of present illness. I confirmed and edited asnecessary the review of systems, past medical/surgical history, family history, social history, and examination findings as documented by others; and I examined the patient myself. I personally reviewed the relevant tests, images, and reports as documented above. I formulated and edited as necessary the assessment and plan and discussed the findings and management plan with the patient and family.  - Yue Starr MD 11:47 AM 10/23/2017

## 2017-11-06 ENCOUNTER — OFFICE VISIT (OUTPATIENT)
Dept: OPHTHALMOLOGY | Facility: CLINIC | Age: 32
End: 2017-11-06
Attending: OPHTHALMOLOGY
Payer: COMMERCIAL

## 2017-11-06 DIAGNOSIS — Z48.810 AFTERCARE FOLLOWING SURGERY OF A SENSORY ORGAN: Primary | ICD-10-CM

## 2017-11-06 DIAGNOSIS — T86.8409 CORNEAL TRANSPLANT REJECTION: ICD-10-CM

## 2017-11-06 DIAGNOSIS — Z94.7 HISTORY OF CORNEA TRANSPLANT: Primary | ICD-10-CM

## 2017-11-06 PROCEDURE — 40000269 ZZH STATISTIC NO CHARGE FACILITY FEE: Mod: ZF

## 2017-11-06 PROCEDURE — 99213 OFFICE O/P EST LOW 20 MIN: CPT | Mod: ZF

## 2017-11-06 PROCEDURE — 99211 OFF/OP EST MAY X REQ PHY/QHP: CPT | Mod: ZF,27

## 2017-11-06 ASSESSMENT — REFRACTION_WEARINGRX
OD_CYLINDER: +3.25
OD_AXIS: 125
OD_AXIS: 125
OS_AXIS: 098
OD_SPHERE: -6.50
OS_CYLINDER: +2.00
OD_SPHERE: -6.50
OS_SPHERE: -1.25
OS_AXIS: 098
OS_SPHERE: -1.25
OD_CYLINDER: +3.25
OS_CYLINDER: +2.00

## 2017-11-06 ASSESSMENT — TONOMETRY
IOP_METHOD: APPLANATION
OD_IOP_MMHG: 16
OS_IOP_MMHG: 23
OS_IOP_MMHG: 23
IOP_METHOD: APPLANATION
OD_IOP_MMHG: 16

## 2017-11-06 ASSESSMENT — PACHYMETRY
OS_CT(UM): 649
OD_CT(UM): 575

## 2017-11-06 ASSESSMENT — CONF VISUAL FIELD
METHOD: COUNTING FINGERS
METHOD: COUNTING FINGERS
OS_INFERIOR_TEMPORAL_RESTRICTION: 3
OD_NORMAL: 1
OD_NORMAL: 1
OS_INFERIOR_TEMPORAL_RESTRICTION: 3

## 2017-11-06 ASSESSMENT — VISUAL ACUITY
OS_CC+: -1
METHOD: SNELLEN - LINEAR
OD_CC: 20/20
OS_CC: 20/125
OS_PH_CC: 20/60
CORRECTION_TYPE: GLASSES
CORRECTION_TYPE: GLASSES
METHOD: SNELLEN - LINEAR
OS_CC: 20/125
OS_CC+: -1
OS_PH_CC: 20/60
OD_CC: 20/20

## 2017-11-06 ASSESSMENT — CUP TO DISC RATIO
OD_RATIO: 0.3
OS_RATIO: 0.3
OS_RATIO: 0.3
OD_RATIO: 0.3

## 2017-11-06 ASSESSMENT — EXTERNAL EXAM - RIGHT EYE
OD_EXAM: NORMAL
OD_EXAM: NORMAL

## 2017-11-06 ASSESSMENT — SLIT LAMP EXAM - LIDS
COMMENTS: PROTECTIVE PTOSIS
COMMENTS: NORMAL
COMMENTS: NORMAL
COMMENTS: PROTECTIVE PTOSIS

## 2017-11-06 ASSESSMENT — EXTERNAL EXAM - LEFT EYE
OS_EXAM: NORMAL
OS_EXAM: NORMAL

## 2017-11-06 NOTE — PROGRESS NOTES
CC: Post op globe repair left eye    HPI: Enrique Gabriel is a 31 year old male s/p globe repair, left eye. 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 Presented with dehiscence of cornea at limbus from 9 o'clock to 3'oclock with extremely hypotonous eye, vitreous in the anterior chamber, dislocated IOL, and vitreous hemorrhage. questionable Retinal detachment. Underwent resuturing of cornea to limbus and limited peritomy without evidence of scleral laceration.    Interval history:  He underwent left Ahmed tube with Dr. Starr on 10/3/17. Patient states vision is blurry particularly in the morning and whenever his IOP is elevated.    POHx;  PKP for keratoconus OU 2002  Trauma to OS with ruptured glob 2003  Repeat PKP OS 2004  Previous cataract surgery done at [facility] in [place].   Secondary IOL left eye in 2003.   H/o graft rejection left eye treated in 2011.  Ahmed tube OS 10/3/17    Gtts:  Combigan BID OS  Prednisolone QID OS    Assessment/Plan:    1.  s/p repair of globe rupture/corneal dehiscence left eye (1/14/17)   - Decrease Prednisolone TID OS   -graft clear centrally and inferiorly; fibrous ingrowth superotemporally    -Does not want to wear CL   -defer repeat PKP until IOP well controlled     2. AC vitreous prolapse OS   - monitor for now   - consider anterior vitrectomy if surgery  planned for #1      3. Ocular hypertension OS   s/p left Ahmed valve   Start of combigan by Dr. Starr   IOP 23 today - elevated IOP appears to correlate with his blurry vision, elevate IOP likely causing worsening K edema in the AM   Continue to follow-up with Dr. Starr    Return to clinic in 1 month     Mian Vincent MD  Ophthalmology, PGY-3    ~~~~~~~~~~~~~~~~~~~~~~~~~~~~~~~~~~~~~~~~~~~~~~~~~~~~~~~~~~~~~~~~    Complete documentation of historical and exam elements from today's encounter can be found in  the full encounter summary report (not reduplicated in this progress note). I personally obtained the chief complaint(s) and history of present illness.  I confirmed and edited as necessary the review of systems, past medical/surgical history, family history, social history, and examination findings as documented by others; and I examined the patient myself. I personally reviewed the relevant tests, images, and reports as documented above. I formulated and edited as necessary the assessment and plan and discussed the findings and management plan with the patient and family.    Jose G Hou MD    --------------------------------------------------------------------------------------------------------------------------------------------  Pachymetry - Interpretation & Report  Indication: Corneal transplant rejection OS  Performed by: Jose G Hou MD  Reliability: good  Patient cooperation: good  Findings:   Right eye:  575 micrometers centrally   Left eye:  649 micrometers centrally   Interval Change, Assessment, & Impact on treatment:   Right eye:  Stable, baseline   Left eye:  thickened   Signed: Jose G Hou 11/6/2017 9:47 AM

## 2017-11-06 NOTE — PROGRESS NOTES
CC: Postoperative week 5 Ahmed tube with corneal patch graft LEFT eye 10/3/17    HPI: High intraocular pressure following globe repair and steroids for rejection, left eye. 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 Presented with dehiscence of cornea at limbus from 9 o'clock to 3'oclock with extremely hypotonous eye, vitreous in the anterior chamber, dislocated IOL, and vitreous hemorrhage. Underwent resuturing of cornea to limbus and limited peritomy without evidence of scleral laceration.    Interval history:   Mild left eye soreness still, however slowly improving. Vision seems worse last couple of days    Past ocular history    PKP for keratoconus OU 2002  Trauma to OS with ruptured glob 2003  Repeat PKP OS 2004  Previous cataract surgery done at [facility] in [place].   Secondary IOL left eye in 2003.   H/o graft rejection left eye treated in 2011.  Ahmed tube with corneal patch graft LEFT eye 10/3/17    Gtts:  Pred four times a day left eye   Combigan BID left eye stopped post op, restarted 10/23/17    Assessment:    Glaucoma left eye, stage indeterminate  without visual field    Ahmed tube placement with corneal patch graft- doing well   Steroid responder   Possible epithelial ingrowth   Previous trauma   Extensive scarring at superior limbus    S/p repair of globe rupture/corneal dehiscence left eye (1/14/17)   worsened AC reaction with rejection line vs. retrocorneal membrane from epi/fibrous ingrowth   Cont Pred to Q1hour   VA improved with RGP with Dr. Joseph in past, however, he does not want to wear CL at this point. Still functioning with glasses   May  require repeat PKP after resolution of rejection +/- reposition of IOL     AC vitreous prolapse OS   May need vitrectomy if tube clogs    Plan:   intraocular pressure much better with restarting Combigan    Continue prednisolone four  times a day left eye per .   return to clinic 5-6 weeksfor intraocular pressure check         Attending Physician Attestation:  Complete documentation of historical and exam elements from today's encounter can be found in the full encounter summary report (not reduplicated in this progress note). I personally obtained the chief complaint(s) and history of present illness. I confirmed and edited asnecessary the review of systems, past medical/surgical history, family history, social history, and examination findings as documented by others; and I examined the patient myself. I personally reviewed the relevant tests, images, and reports as documented above. I formulated and edited as necessary the assessment and plan and discussed the findings and management plan with the patient and family.  - Yue Starr MD 9:01 AM 11/6/2017

## 2017-11-06 NOTE — NURSING NOTE
Chief Complaints and History of Present Illnesses   Patient presents with     Follow Up For     POW #5 s/p Ahmed tube with corneal patch graft LEFT eye 10/3/17     HPI    Affected eye(s):  Left   Symptoms:     No floaters   No flashes   No redness   No Dryness         Do you have eye pain now?:  No      Comments:  Pt state vision has improved slightly since last visit. No eye pain today.     Jamar FISCHER November 6, 2017 8:16 AM

## 2017-11-06 NOTE — NURSING NOTE
Chief Complaints and History of Present Illnesses   Patient presents with     Follow Up For     1 month follow up s/p repair of globe rupture/corneal dehiscence left eye (1/14/17)     HPI    Affected eye(s):  Left   Symptoms:     No redness   No Dryness   No itching         Do you have eye pain now?:  No      Comments:  Pt states vision has improved slightly since last visit. No eye pain today. No flashers or floaters.    Jamar FISCHER November 6, 2017 8:14 AM

## 2017-11-06 NOTE — MR AVS SNAPSHOT
After Visit Summary   11/6/2017    Enrique Gabriel    MRN: [id number]           Patient Information     Date Of Birth          1985        Visit Information        Provider Department      11/6/2017 9:15 AM Jose G Hou MD [facility]        Today's Diagnoses     History of cornea transplant - Both Eyes    -  1    Corneal transplant rejection           Follow-ups after your visit        Follow-up notes from your care team     Return in about 1 month (around 12/6/2017) for Follow Up.      Who to contact     Please call your clinic at [phone] to:    Ask questions about your health    Make or cancel appointments    Discuss your medicines    Learn about your test results    Speak to your doctor   If you have compliments or concerns about an experience at your clinic, or if you wish to file a complaint, please contact [facility] Patient Relations at [phone] or email us at [email or web address]         Additional Information About Your Visit        MyChart Information     Embee Mobilet gives you secure access to your electronic health record. If you see a primary care provider, you can also send messages to your care team and make appointments. If you have questions, please call your primary care clinic.  If you do not have a primary care provider, please call [phone] and they will assist you.      Piccsy is an electronic gateway that provides easy, online access to your medical records. With Piccsy, you can request a clinic appointment, read your test results, renew a prescription or communicate with your care team.     To access your existing account, please contact your [facility] or call [phone] for assistance.        Care EveryWhere ID     This is your Care EveryWhere ID. This could be used by other organizations to access your [place] medical records  [id number]         Blood Pressure from Last 3  Encounters:   01/14/17 137/73    Weight from Last 3 Encounters:   01/13/17 81.6 kg (180 lb)              We Performed the Following     US OPHTHALMIC DIAG, PACHYMETRY          Today's Medication Changes          These changes are accurate as of: 11/6/17  9:49 AM.  If you have any questions, ask your nurse or doctor.               These medicines have changed or have updated prescriptions.        Dose/Directions    prednisoLONE acetate 1 % ophthalmic susp   Commonly known as:  PRED FORTE   This may have changed:  Another medication with the same name was removed. Continue taking this medication, and follow the directions you see here.   Used for:  Ruptured globe, left, initial encounter   Changed by:  Jennifer Teresa MD        Dose:  1 drop   Place 1 drop Into the left eye 4 times daily   Quantity:  10 mL   Refills:  1                Primary Care Provider Office Phone # Fax #    Torsten Meehan [phone]       [facility] [address]  [facility] [address]        Equal Access to Services     CHRISSY GERONIMO AH: Hadii efrain hawkinso Soedward, waaxda luqadaha, qaybta kaalmada adeegyada, curt kennedy . So [facility] [phone].    ATENCIÓN: Si habla español, tiene a shepherd disposición servicios gratuitos de asistencia lingüística. Llame al [phone].    We comply with applicable federal civil rights laws and [place] laws. We do not discriminate on the basis of race, color, national origin, age, disability, sex, sexual orientation, or gender identity.            Thank you!     Thank you for choosing EYE CLINIC  for your care. Our goal is always to provide you with excellent care. Hearing back from our patients is one way we can continue to improve our services. Please take a few minutes to complete the written survey that you may receive in the mail after your visit with us. Thank you!             Your Updated Medication List - Protect others around you: Learn how  to safely use, store and throw away your medicines at www.disposemymeds.org.          This list is accurate as of: 11/6/17  9:49 AM.  Always use your most recent med list.                   Brand Name Dispense Instructions for use Diagnosis    brimonidine-timolol 0.2-0.5 % ophthalmic solution    COMBIGAN    1 Bottle    Place 1 drop Into the left eye 2 times daily    Rejection of cornea transplant, History of cornea transplant, Ruptured globe of left eye, subsequent encounter, Borderline glaucoma with ocular hypertension, left       hypromellose 0.3 % Gel ophthalmic gel    GENTEAL    10 g    Place 0.5 g (0.5 inches) Into the left eye At Bedtime Apply approximately a half inch ribbon of ointment to the inside of your lower lids. Warning, application of the ointment to your eyes will cause temporary blurring of your vision from the ointment coating your eye. Please apply right before bedtime.    Ruptured globe of left eye, initial encounter       prednisoLONE acetate 1 % ophthalmic susp    PRED FORTE    10 mL    Place 1 drop Into the left eye 4 times daily    Ruptured globe, left, initial encounter

## 2017-11-06 NOTE — MR AVS SNAPSHOT
After Visit Summary   11/6/2017    Enrique Gabriel    MRN: [id number]           Patient Information     Date Of Birth          1985        Visit Information        Provider Department      11/6/2017 8:00 AM Yue Starr MD Eye Clinic        Today's Diagnoses     Aftercare following surgery of a sensory organ    -  1       Follow-ups after your visit        Follow-up notes from your care team     Return in about 5 weeks (around 12/11/2017) for iop check.      Your next 10 appointments already scheduled     Dec 11, 2017  2:30 PM CST   RETURN GLAUCOMA with Yue Starr MD   Eye Clinic ([facility])    [address]  [address]   [address]  [facility] [address] [phone]            Feb 07, 2018  1:00 PM CST   RETURN CORNEA with Jose G Hou MD   Eye Clinic ([facility])    [address]  [address]   [address]  [facility] [id number] [phone]              Who to contact     Please call your clinic at [phone] to:    Ask questions about your health    Make or cancel appointments    Discuss your medicines    Learn about your test results    Speak to your doctor   If you have compliments or concerns about an experience at your clinic, or if you wish to file a complaint, please contact [facility] Patient Relations at [phone] or email us at [email or web address]         Additional Information About Your Visit        MyChart Information     SparkBaset gives you secure access to your electronic health record. If you see a primary care provider, you can also send messages to your care team and make appointments. If you have questions, please call your primary care clinic.  If you do not have a primary care provider, please call [phone] and they will assist you.      Sing Ting Delicious is an electronic gateway that provides easy, online access to your medical records. With Sing Ting Delicious, you  can request a clinic appointment, read your test results, renew a prescription or communicate with your care team.     To access your existing account, please contact your [facility] or call [phone] for assistance.        Care EveryWhere ID     This is your Care EveryWhere ID. This could be used by other organizations to access your [place] medical records  [id number]         Blood Pressure from Last 3 Encounters:   01/14/17 137/73    Weight from Last 3 Encounters:   01/13/17 81.6 kg (180 lb)              Today, you had the following     No orders found for display         Today's Medication Changes          These changes are accurate as of: 11/6/17  9:55 AM.  If you have any questions, ask your nurse or doctor.               These medicines have changed or have updated prescriptions.        Dose/Directions    prednisoLONE acetate 1 % ophthalmic susp   Commonly known as:  PRED FORTE   This may have changed:  Another medication with the same name was removed. Continue taking this medication, and follow the directions you see here.   Used for:  Ruptured globe, left, initial encounter   Changed by:  Jennifer Teresa MD        Dose:  1 drop   Place 1 drop Into the left eye 4 times daily   Quantity:  10 mL   Refills:  1                Primary Care Provider Office Phone # Fax #    Torsten Meehan [phone]       [facility] [address]  [facility] [address]        Equal Access to Services     CHRISSY GERONIMO AH: Autumn hawkinso Soedward, waaxda luqadaha, qaybta kaalmada ademinal, curt dickens. So [facility] [phone].    ATENCIÓN: Si habla español, tiene a shepherd disposición servicios gratuitos de asistencia lingüística. Llame al [phone].    We comply with applicable federal civil rights laws and [place] laws. We do not discriminate on the basis of race, color, national origin, age, disability, sex, sexual orientation,  or gender identity.            Thank you!     Thank you for choosing EYE CLINIC  for your care. Our goal is always to provide you with excellent care. Hearing back from our patients is one way we can continue to improve our services. Please take a few minutes to complete the written survey that you may receive in the mail after your visit with us. Thank you!             Your Updated Medication List - Protect others around you: Learn how to safely use, store and throw away your medicines at www.disposemymeds.org.          This list is accurate as of: 11/6/17  9:55 AM.  Always use your most recent med list.                   Brand Name Dispense Instructions for use Diagnosis    brimonidine-timolol 0.2-0.5 % ophthalmic solution    COMBIGAN    1 Bottle    Place 1 drop Into the left eye 2 times daily    Rejection of cornea transplant, History of cornea transplant, Ruptured globe of left eye, subsequent encounter, Borderline glaucoma with ocular hypertension, left       hypromellose 0.3 % Gel ophthalmic gel    GENTEAL    10 g    Place 0.5 g (0.5 inches) Into the left eye At Bedtime Apply approximately a half inch ribbon of ointment to the inside of your lower lids. Warning, application of the ointment to your eyes will cause temporary blurring of your vision from the ointment coating your eye. Please apply right before bedtime.    Ruptured globe of left eye, initial encounter       prednisoLONE acetate 1 % ophthalmic susp    PRED FORTE    10 mL    Place 1 drop Into the left eye 4 times daily    Ruptured globe, left, initial encounter

## 2017-12-11 ENCOUNTER — OFFICE VISIT (OUTPATIENT)
Dept: OPHTHALMOLOGY | Facility: CLINIC | Age: 32
End: 2017-12-11
Attending: OPHTHALMOLOGY
Payer: COMMERCIAL

## 2017-12-11 DIAGNOSIS — H40.32X0: Primary | ICD-10-CM

## 2017-12-11 PROCEDURE — 99212 OFFICE O/P EST SF 10 MIN: CPT | Mod: ZF

## 2017-12-11 RX ORDER — LATANOPROST 50 UG/ML
1 SOLUTION/ DROPS OPHTHALMIC AT BEDTIME
Qty: 1 BOTTLE | Refills: 11 | Status: SHIPPED | OUTPATIENT
Start: 2017-12-11 | End: 2018-03-27

## 2017-12-11 ASSESSMENT — VISUAL ACUITY
OD_CC: 20/20-
METHOD: SNELLEN - LINEAR
OS_PH_CC: 20/50+1
OS_CC: 20/150

## 2017-12-11 ASSESSMENT — CUP TO DISC RATIO
OD_RATIO: 0.4
OS_RATIO: 0.5

## 2017-12-11 ASSESSMENT — SLIT LAMP EXAM - LIDS
COMMENTS: PROTECTIVE PTOSIS
COMMENTS: NORMAL

## 2017-12-11 ASSESSMENT — TONOMETRY
OD_IOP_MMHG: 18
IOP_METHOD: APPLANATION
OS_IOP_MMHG: 24

## 2017-12-11 ASSESSMENT — EXTERNAL EXAM - RIGHT EYE: OD_EXAM: NORMAL

## 2017-12-11 ASSESSMENT — EXTERNAL EXAM - LEFT EYE: OS_EXAM: NORMAL

## 2017-12-11 NOTE — NURSING NOTE
"Chief Complaints and History of Present Illnesses   Patient presents with     Post Op (Ophthalmology) Left Eye     Ahmed tube with corneal patch graft     HPI    Last Eye Exam:  11/6/17   Affected eye(s):  Left   Symptoms:     Blurred vision      Frequency:  Intermittent          Comments:  Postoperative week 7 Ahmed tube with corneal patch graft OS 10/3/17  Using Combigan BID OS, Pred Forte once every 2 days OD, TID OS.  States he thinks can feel the pressure increasing, but doesn't know if it's \"consistent.\"   Pressure feeling is behind OS.  VA feels the same (still blurry) as 11/6/17             "

## 2017-12-11 NOTE — MR AVS SNAPSHOT
After Visit Summary   12/11/2017    Enrique Gabriel    MRN: [id number]           Patient Information     Date Of Birth          1985        Visit Information        Provider Department      12/11/2017 2:30 PM Yue Starr MD Eye Clinic        Today's Diagnoses     Glaucoma associated with ocular trauma, left, stage unspecified    -  1       Follow-ups after your visit        Follow-up notes from your care team     Return in about 4 months (around 4/11/2018) for visual field LVC.      Your next 10 appointments already scheduled     Feb 07, 2018  1:00 PM CST   RETURN CORNEA with Jose G Hou MD   Eye Clinic ([facility])    [address]  [address]   [address] [address]  [facility] [address] [phone]            May 02, 2018 12:15 PM CDT   RETURN GLAUCOMA with Yue Starr MD   Eye Clinic ([facility])    [address]  [address] [facility]  [address] [address]  [facility] [address] [phone]              Who to contact     Please call your clinic at [phone] to:    Ask questions about your health    Make or cancel appointments    Discuss your medicines    Learn about your test results    Speak to your doctor   If you have compliments or concerns about an experience at your clinic, or if you wish to file a complaint, please contact [facility] Patient Relations at [phone] or email us at [email or web address]         Additional Information About Your Visit        MyChart Information     S[employer] Hasee computert gives you secure access to your electronic health record. If you see a primary care provider, you can also send messages to your care team and make appointments. If you have questions, please call your primary care clinic.  If you do not have a primary care provider, please call [phone] and they will assist you.      [employer] is an electronic gateway that provides easy, online access to your medical  records. With lettrs, you can request a clinic appointment, read your test results, renew a prescription or communicate with your care team.     To access your existing account, please contact your [facility] or call [phone] for assistance.        Care EveryWhere ID     This is your Care EveryWhere ID. This could be used by other organizations to access your [place] medical records  [id number]         Blood Pressure from Last 3 Encounters:   01/14/17 137/73    Weight from Last 3 Encounters:   01/13/17 81.6 kg (180 lb)              Today, you had the following     No orders found for display         Today's Medication Changes          These changes are accurate as of: 12/11/17  2:36 PM.  If you have any questions, ask your nurse or doctor.               Start taking these medicines.        Dose/Directions    latanoprost 0.005 % ophthalmic solution   Commonly known as:  XALATAN   Used for:  Glaucoma associated with ocular trauma, left, stage unspecified        Dose:  1 drop   Place 1 drop Into the left eye At Bedtime   Quantity:  1 Bottle   Refills:  11            Where to get your medicines      These medications were sent to [facility] [place] [address]  [facility] [address] [facility] [address]     Phone:  [phone]     latanoprost 0.005 % ophthalmic solution                Primary Care Provider Office Phone # Fax #    Torsten Meehan [phone]       [facility] [address]  [facility] [address]        Equal Access to Services     CHRISSY GERONIMO AH: Hadii aad ku hadasho Soezioali, waaxda luqadaha, qaybta kaalmada adeegyada, curt dickens. So [facility] [phone].    ATENCIÓN: Si habla español, tiene a shepherd disposición servicios gratuitos de asistencia lingüística. Llame al [phone].    We comply with applicable federal civil rights laws and [place] laws. We do not discriminate on the  basis of race, color, national origin, age, disability, sex, sexual orientation, or gender identity.            Thank you!     Thank you for choosing EYE CLINIC  for your care. Our goal is always to provide you with excellent care. Hearing back from our patients is one way we can continue to improve our services. Please take a few minutes to complete the written survey that you may receive in the mail after your visit with us. Thank you!             Your Updated Medication List - Protect others around you: Learn how to safely use, store and throw away your medicines at www.disposemymeds.org.          This list is accurate as of: 12/11/17  2:36 PM.  Always use your most recent med list.                   Brand Name Dispense Instructions for use Diagnosis    brimonidine-timolol 0.2-0.5 % ophthalmic solution    COMBIGAN    1 Bottle    Place 1 drop Into the left eye 2 times daily    Rejection of cornea transplant, History of cornea transplant, Ruptured globe of left eye, subsequent encounter, Borderline glaucoma with ocular hypertension, left       hypromellose 0.3 % Gel ophthalmic gel    GENTEAL    10 g    Place 0.5 g (0.5 inches) Into the left eye At Bedtime Apply approximately a half inch ribbon of ointment to the inside of your lower lids. Warning, application of the ointment to your eyes will cause temporary blurring of your vision from the ointment coating your eye. Please apply right before bedtime.    Ruptured globe of left eye, initial encounter       latanoprost 0.005 % ophthalmic solution    XALATAN    1 Bottle    Place 1 drop Into the left eye At Bedtime    Glaucoma associated with ocular trauma, left, stage unspecified       prednisoLONE acetate 1 % ophthalmic susp    PRED FORTE    10 mL    Place 1 drop Into the left eye 4 times daily    Ruptured globe, left, initial encounter

## 2017-12-11 NOTE — PROGRESS NOTES
CC: Postoperative week 9 Ahmed tube with corneal patch graft LEFT eye 10/3/17    HPI: High intraocular pressure following globe repair and steroids for rejection, left eye. 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 Presented with dehiscence of cornea at limbus from 9 o'clock to 3'oclock with extremely hypotonous eye, vitreous in the anterior chamber, dislocated IOL, and vitreous hemorrhage. Underwent resuturing of cornea to limbus and limited peritomy without evidence of scleral laceration.    Interval history:   Mild left eye soreness still, however slowly improving. Vision seems worse last couple of days    Past ocular history    PKP for keratoconus OU 2002  Trauma to OS with ruptured glob 2003  Repeat PKP OS 2004  Previous cataract surgery done at [facility] in [place].   Secondary IOL left eye in 2003.   H/o graft rejection left eye treated in 2011.  Ahmed tube with corneal patch graft LEFT eye 10/3/17    Gtts:  Pred three times a day left eye   Latanoprost QHS left eye restarted 12/11/17Combigan BID left eye stopped post op, restarted 10/23/17    Assessment:    Glaucoma left eye, stage indeterminate  without visual field    Ahmed tube placement with corneal patch graft- doing well   Steroid responder   Possible epithelial ingrowth   Previous trauma   Extensive scarring at superior limbus   Intraocular pressure was in the 50's preop     S/p repair of globe rupture/corneal dehiscence left eye (1/14/17)   worsened AC reaction with rejection line vs. retrocorneal membrane from epi/fibrous ingrowth   Cont Pred to Q1hour   VA improved with RGP with Dr. Joseph in past, however, he does not want to wear CL at this point. Still functioning with glasses   May  require repeat PKP after resolution of rejection +/- reposition of IOL     AC vitreous prolapse OS   May need vitrectomy if tube  clogs    Plan:   May be in hypertensive phase of tube   May be steroid responder   intraocular pressure much better with restarting Combigan but may have less morning edema if lower so restart latanoprost left eye    Continue prednisolone four times a day left eye per .   return to clinic 6 weeksfor intraocular pressure check with , please let Dr Starr know if not improved    Return to clinic with Matty 4 months with [facility] left eye        Attending Physician Attestation:  Complete documentation of historical and exam elements from today's encounter can be found in the full encounter summary report (not reduplicated in this progress note). I personally obtained the chief complaint(s) and history of present illness. I confirmed and edited asnecessary the review of systems, past medical/surgical history, family history, social history, and examination findings as documented by others; and I examined the patient myself. I personally reviewed the relevant tests, images, and reports as documented above. I formulated and edited as necessary the assessment and plan and discussed the findings and management plan with the patient and family.  - Yue Starr MD 2:21 PM 12/11/2017

## 2018-03-27 DIAGNOSIS — Z94.7 HISTORY OF CORNEA TRANSPLANT: ICD-10-CM

## 2018-03-27 DIAGNOSIS — H40.32X0: ICD-10-CM

## 2018-03-27 DIAGNOSIS — T86.8409 REJECTION OF CORNEA TRANSPLANT: ICD-10-CM

## 2018-03-27 DIAGNOSIS — S05.32XD RUPTURED GLOBE OF LEFT EYE, SUBSEQUENT ENCOUNTER: ICD-10-CM

## 2018-03-27 DIAGNOSIS — H40.052 BORDERLINE GLAUCOMA WITH OCULAR HYPERTENSION, LEFT: ICD-10-CM

## 2018-03-27 RX ORDER — BRIMONIDINE TARTRATE AND TIMOLOL MALEATE 2; 5 MG/ML; MG/ML
1 SOLUTION OPHTHALMIC 2 TIMES DAILY
Qty: 1 BOTTLE | Refills: 3 | Status: SHIPPED | OUTPATIENT
Start: 2018-03-27 | End: 2018-09-05 | Stop reason: ALTCHOICE

## 2018-03-27 RX ORDER — LATANOPROST 50 UG/ML
1 SOLUTION/ DROPS OPHTHALMIC AT BEDTIME
Qty: 1 BOTTLE | Refills: 11 | Status: SHIPPED | OUTPATIENT
Start: 2018-03-27 | End: 2019-09-25

## 2018-03-27 NOTE — TELEPHONE ENCOUNTER
Rx's sent to pt pharmacy per request  Called to update pt-- no answer and unable to leave voicemail.  Mario Lee RN 2:12 PM 03/27/18

## 2018-04-11 ENCOUNTER — OFFICE VISIT (OUTPATIENT)
Dept: OPHTHALMOLOGY | Facility: CLINIC | Age: 33
End: 2018-04-11
Attending: OPHTHALMOLOGY
Payer: COMMERCIAL

## 2018-04-11 DIAGNOSIS — Z94.7 HISTORY OF CORNEA TRANSPLANT: Primary | ICD-10-CM

## 2018-04-11 PROCEDURE — G0463 HOSPITAL OUTPT CLINIC VISIT: HCPCS | Mod: ZF

## 2018-04-11 RX ORDER — PREDNISOLONE ACETATE 10 MG/ML
1 SUSPENSION/ DROPS OPHTHALMIC 2 TIMES DAILY
COMMUNITY
End: 2018-06-11

## 2018-04-11 ASSESSMENT — VISUAL ACUITY
CORRECTION_TYPE: GLASSES
OD_CC+: -1
METHOD: SNELLEN - LINEAR
OD_CC: 20/20
OS_SC: 20/250
OS_PH_SC: 20/80

## 2018-04-11 ASSESSMENT — SLIT LAMP EXAM - LIDS
COMMENTS: NORMAL
COMMENTS: PROTECTIVE PTOSIS

## 2018-04-11 ASSESSMENT — TONOMETRY
IOP_METHOD: APPLANATION
OS_IOP_MMHG: 16
OD_IOP_MMHG: 16

## 2018-04-11 ASSESSMENT — REFRACTION_WEARINGRX
OS_AXIS: 123
OD_SPHERE: -6.50
OD_AXIS: 125
OD_CYLINDER: +3.25
OS_CYLINDER: +2.25
OS_SPHERE: -0.25

## 2018-04-11 ASSESSMENT — EXTERNAL EXAM - RIGHT EYE: OD_EXAM: NORMAL

## 2018-04-11 ASSESSMENT — CONF VISUAL FIELD
OD_NORMAL: 1
OS_INFERIOR_NASAL_RESTRICTION: 3

## 2018-04-11 ASSESSMENT — CUP TO DISC RATIO: OS_RATIO: 0.5

## 2018-04-11 ASSESSMENT — EXTERNAL EXAM - LEFT EYE: OS_EXAM: NORMAL

## 2018-04-11 NOTE — PROGRESS NOTES
CC: Post op globe repair left eye    HPI: Enrique Gabriel is a 31 year old male s/p globe repair, left eye. 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 Presented with dehiscence of cornea at limbus from 9 o'clock to 3'oclock with extremely hypotonous eye, vitreous in the anterior chamber, dislocated IOL, and vitreous hemorrhage. questionable Retinal detachment. Underwent resuturing of cornea to limbus and limited peritomy without evidence of scleral laceration.    Interval history:  He underwent left Ahmed tube with Dr. Starr on 10/3/17. Eyes are comfortable without pain or significant redness since last encounter. Eyes are feeling most comfortable they have felt since tube surgery. Patient reports good compliance with gtts. Vision has been stable.     POHx;  PKP for keratoconus OU 2002  Trauma to OS with ruptured glob 2003  Repeat PKP OS 2004  Previous cataract surgery done at [facility] in [place].   Secondary IOL left eye in 2003.   H/o graft rejection left eye treated in 2011.  Ahmed tube OS 10/3/17    Gtts:  Combigan BID left eye   Latanoprost at bedtime left eye   Prednisolone twice a day left eye   Prednisolone every other day right eye     Assessment/Plan:    1.  s/p repair of globe rupture/corneal dehiscence left eye (1/14/17)   -graft clear centrally and inferiorly; fibrous ingrowth superotemporally    -Continue prednisolone twice a day left eye - still has some AC inflammation in unicameral eye, but steroid responder   -Does not want to wear CL    -IOP better controlled 16 today could consider pentrating keratoplasty (PKP)      2. AC vitreous prolapse OS   - monitor for now   - consider anterior vitrectomy if surgery  planned for #1      3. Ocular hypertension OS   s/p left Ahmed valve   Continue intraocular pressure gtts as above, patient scheduled to see Dr. Starr early may.     IOP 16  today - improved today from last encounter    Continue to follow-up with Dr. Starr    Return to clinic in as scheduled with Dr. Starr, return to clinic  2 months, if intraocular pressure stable with Dr. Starr consider repeat pentrating keratoplasty (PKP) left eye     Caro Alejo MD  Ophthalmology PGY3     ~~~~~~~~~~~~~~~~~~~~~~~~~~~~~~~~~~~~~~~~~~~~~~~~~~~~~~~~~~~~~~~~    Complete documentation of historical and exam elements from today's encounter can be found in the full encounter summary report (not reduplicated in this progress note). I personally obtained the chief complaint(s) and history of present illness.  I confirmed and edited as necessary the review of systems, past medical/surgical history, family history, social history, and examination findings as documented by others; and I examined the patient myself. I personally reviewed the relevant tests, images, and reports as documented above. I formulated and edited as necessary the assessment and plan and discussed the findings and management plan with the patient and family.    Jsoe G Hou MD

## 2018-04-11 NOTE — MR AVS SNAPSHOT
After Visit Summary   4/11/2018    Enrique Gabriel    MRN: [id number]           Patient Information     Date Of Birth          1985        Visit Information        Provider Department      4/11/2018 2:00 PM Jose G Hou MD [facility]        Today's Diagnoses     History of cornea transplant - Both Eyes    -  1       Follow-ups after your visit        Follow-up notes from your care team     Return in about 2 months (around 6/11/2018) for Follow Up.      Your next 10 appointments already scheduled     May 02, 2018 12:15 PM CDT   RETURN GLAUCOMA with Yue Starr MD   Eye Clinic ([facility])    [address]  [address] [address]  [facility] [address] [phone]            Jun 11, 2018 10:00 AM CDT   RETURN CORNEA with Jose G Hou MD   Eye Clinic ([facility])    [address]  [address] [address]  [facility] [address] [phone]              Who to contact     Please call your clinic at [phone] to:    Ask questions about your health    Make or cancel appointments    Discuss your medicines    Learn about your test results    Speak to your doctor            Additional Information About Your Visit        [employer] Information     MobiClub gives you secure access to your electronic health record. If you see a primary care provider, you can also send messages to your care team and make appointments. If you have questions, please call your primary care clinic.  If you do not have a primary care provider, please call [phone] and they will assist you.      MobiClub is an electronic gateway that provides easy, online access to your medical records. With [employer]obi[employer], you can request a clinic appointment, read your test results, renew a prescription or communicate with your care team.     To access your existing account, please contact your [facility] or call  [phone] for assistance.        Care EveryWhere ID     This is your Care EveryWhere ID. This could be used by other organizations to access your [place] medical records  [id number]         Blood Pressure from Last 3 Encounters:   01/14/17 137/73    Weight from Last 3 Encounters:   01/13/17 81.6 kg (180 lb)              Today, you had the following     No orders found for display       Primary Care Provider Office Phone # Fax #    Torsten Meehan [phone]       [facility] [address]  [facility] [address]        Equal Access to Services     [facility]: Hadii aad ku hadasho Soomaali, waaxda luqadaha, qaybta kaalmada adeegyada, curt kennedy . So [facility] [phone].    ATENCIÓN: Si habla español, tiene a shepherd disposición servicios gratuitos de asistencia lingüística. [facility] [phone].    We comply with applicable federal civil rights laws and [place] laws. We do not discriminate on the basis of race, color, national origin, age, disability, sex, sexual orientation, or gender identity.            Thank you!     Thank you for choosing EYE CLINIC  for your care. Our goal is always to provide you with excellent care. Hearing back from our patients is one way we can continue to improve our services. Please take a few minutes to complete the written survey that you may receive in the mail after your visit with us. Thank you!             Your Updated Medication List - Protect others around you: Learn how to safely use, store and throw away your medicines at www.disposemymeds.org.          This list is accurate as of 4/11/18  3:45 PM.  Always use your most recent med list.                   Brand Name Dispense Instructions for use Diagnosis    brimonidine-timolol 0.2-0.5 % ophthalmic solution    COMBIGAN    1 Bottle    Place 1 drop Into the left eye 2 times daily    Rejection of cornea transplant, History of cornea transplant, Ruptured globe of left eye,  subsequent encounter, Borderline glaucoma with ocular hypertension, left       hypromellose 0.3 % Gel ophthalmic gel    GENTEAL    10 g    Place 0.5 g (0.5 inches) Into the left eye At Bedtime Apply approximately a half inch ribbon of ointment to the inside of your lower lids. Warning, application of the ointment to your eyes will cause temporary blurring of your vision from the ointment coating your eye. Please apply right before bedtime.    Ruptured globe of left eye, initial encounter       latanoprost 0.005 % ophthalmic solution    XALATAN    1 Bottle    Place 1 drop Into the left eye At Bedtime    Glaucoma associated with ocular trauma, left, stage unspecified       * prednisoLONE acetate 1 % ophthalmic susp    PRED FORTE     Place 1 drop Into the left eye 2 times daily        * prednisoLONE acetate 1 % ophthalmic susp    PRED FORTE    10 mL    Place 1 drop Into the left eye 4 times daily    Ruptured globe, left, initial encounter       * Notice:  This list has 2 medication(s) that are the same as other medications prescribed for you. Read the directions carefully, and ask your doctor or other care provider to review them with you.

## 2018-05-01 DIAGNOSIS — T38.0X5A STEROID INDUCED GLAUCOMA, BOTH EYES: Primary | ICD-10-CM

## 2018-05-01 DIAGNOSIS — H40.63X0 STEROID INDUCED GLAUCOMA, BOTH EYES: Primary | ICD-10-CM

## 2018-05-02 ENCOUNTER — OFFICE VISIT (OUTPATIENT)
Dept: OPHTHALMOLOGY | Facility: CLINIC | Age: 33
End: 2018-05-02
Attending: OPHTHALMOLOGY
Payer: COMMERCIAL

## 2018-05-02 DIAGNOSIS — H40.63X0 STEROID INDUCED GLAUCOMA, BOTH EYES: ICD-10-CM

## 2018-05-02 DIAGNOSIS — T38.0X5A STEROID INDUCED GLAUCOMA, BOTH EYES: ICD-10-CM

## 2018-05-02 PROCEDURE — 92083 EXTENDED VISUAL FIELD XM: CPT | Mod: ZF | Performed by: OPHTHALMOLOGY

## 2018-05-02 PROCEDURE — G0463 HOSPITAL OUTPT CLINIC VISIT: HCPCS | Mod: ZF

## 2018-05-02 ASSESSMENT — REFRACTION_WEARINGRX
OS_CYLINDER: +2.25
OD_CYLINDER: +3.25
OS_AXIS: 123
OS_SPHERE: -0.25
OD_SPHERE: -6.50
OD_AXIS: 125

## 2018-05-02 ASSESSMENT — VISUAL ACUITY
METHOD: SNELLEN - LINEAR
OD_CC: 20/20
OS_CC: 20/300
OS_PH_CC: 20/150
CORRECTION_TYPE: GLASSES

## 2018-05-02 ASSESSMENT — TONOMETRY
IOP_METHOD: APPLANATION
OD_IOP_MMHG: 17
OS_IOP_MMHG: 16

## 2018-05-02 ASSESSMENT — EXTERNAL EXAM - RIGHT EYE: OD_EXAM: NORMAL

## 2018-05-02 ASSESSMENT — CONF VISUAL FIELD
OS_INFERIOR_NASAL_RESTRICTION: 3
OS_INFERIOR_TEMPORAL_RESTRICTION: 3
OD_NORMAL: 1
METHOD: COUNTING FINGERS

## 2018-05-02 ASSESSMENT — CUP TO DISC RATIO: OS_RATIO: 0.5

## 2018-05-02 ASSESSMENT — SLIT LAMP EXAM - LIDS
COMMENTS: NORMAL
COMMENTS: PROTECTIVE PTOSIS

## 2018-05-02 ASSESSMENT — EXTERNAL EXAM - LEFT EYE: OS_EXAM: NORMAL

## 2018-05-02 NOTE — NURSING NOTE
Chief Complaints and History of Present Illnesses   Patient presents with     Follow Up For     5 month follow up Glaucoma left eye     HPI    Affected eye(s):  Both   Symptoms:     No floaters   No flashes   No redness   No tearing   No Dryness         Do you have eye pain now?:  No      Comments:  Pt states vision is the same as last visit. No eye pain today.     Jamar FISCHER May 2, 2018 11:55 AM'

## 2018-05-02 NOTE — MR AVS SNAPSHOT
After Visit Summary   5/2/2018    Enrique Gabriel    MRN: [id number]           Patient Information     Date Of Birth          1985        Visit Information        Provider Department      5/2/2018 12:15 PM Yue Starr MD [facility] Clinic        Today's Diagnoses     Steroid induced glaucoma, both eyes           Follow-ups after your visit        Follow-up notes from your care team     Return in about 5 months (around 10/2/2018) for OCT rnfl.      Your next 10 appointments already scheduled     Jun 11, 2018 10:00 AM CDT   RETURN CORNEA with Jose G Hou MD   Eye Clinic ([facility])    [address] [place] [address]   [phone]            Sep 05, 2018 12:15 PM CDT   RETURN GLAUCOMA with Yue Starr MD   Eye Clinic ([facility])    [address] [address]   [phone]              Who to contact     Please call your clinic at [phone] to:    Ask questions about your health    Make or cancel appointments    Discuss your medicines    Learn about your test results    Speak to your doctor            Additional Information About Your Visit        [employer] Information     TapRush gives you secure access to your electronic health record. If you see a primary care provider, you can also send messages to your care team and make appointments. If you have questions, please call your primary care clinic.  If you do not have a primary care provider, please call [phone] and they will assist you.      TapRush is an electronic gateway that provides easy, online access to your medical records. With TapRush, you can request a clinic appointment, read your test results, renew a prescription or communicate with your care team.     To access your existing account, please contact your [facility] or call [phone] for  assistance.        Care EveryWhere ID     This is your Care EveryWhere ID. This could be used by other organizations to access your [place] medical records  [id number]         Blood Pressure from Last 3 Encounters:   01/14/17 137/73    Weight from Last 3 Encounters:   01/13/17 81.6 kg (180 lb)              We Performed the Following     [facility] OS        Primary Care Provider Office Phone # Fax #    Torsten Meehan [phone]       [facility] [address]  [facility] [address]        Equal Access to Services     CHRISSY GERONIMO : Hadii aad ku hadasho Soomaali, waaxda luqadaha, qaybta kaalmada adeegyada, waxay idiin hayaan ignacia kennedy . So [facility] [phone].    ATENCIÓN: Si habla español, tiene a shepherd disposición servicios gratuitos de asistencia lingüística. [facility] [phone].    We comply with applicable federal civil rights laws and [place] laws. We do not discriminate on the basis of race, color, national origin, age, disability, sex, sexual orientation, or gender identity.            Thank you!     Thank you for choosing EYE CLINIC  for your care. Our goal is always to provide you with excellent care. Hearing back from our patients is one way we can continue to improve our services. Please take a few minutes to complete the written survey that you may receive in the mail after your visit with us. Thank you!             Your Updated Medication List - Protect others around you: Learn how to safely use, store and throw away your medicines at www.disposemymeds.org.          This list is accurate as of 5/2/18  1:06 PM.  Always use your most recent med list.                   Brand Name Dispense Instructions for use Diagnosis    brimonidine-timolol 0.2-0.5 % ophthalmic solution    COMBIGAN    1 Bottle    Place 1 drop Into the left eye 2 times daily    Rejection of cornea transplant, History of cornea transplant, Ruptured globe of left eye, subsequent encounter,  Borderline glaucoma with ocular hypertension, left       hypromellose 0.3 % Gel ophthalmic gel    GENTEAL    10 g    Place 0.5 g (0.5 inches) Into the left eye At Bedtime Apply approximately a half inch ribbon of ointment to the inside of your lower lids. Warning, application of the ointment to your eyes will cause temporary blurring of your vision from the ointment coating your eye. Please apply right before bedtime.    Ruptured globe of left eye, initial encounter       latanoprost 0.005 % ophthalmic solution    XALATAN    1 Bottle    Place 1 drop Into the left eye At Bedtime    Glaucoma associated with ocular trauma, left, stage unspecified       * prednisoLONE acetate 1 % ophthalmic susp    PRED FORTE     Place 1 drop Into the left eye 2 times daily        * prednisoLONE acetate 1 % ophthalmic susp    PRED FORTE    10 mL    Place 1 drop Into the left eye 4 times daily    Ruptured globe, left, initial encounter       * Notice:  This list has 2 medication(s) that are the same as other medications prescribed for you. Read the directions carefully, and ask your doctor or other care provider to review them with you.

## 2018-05-02 NOTE — PROGRESS NOTES
CC: Ahmed tube with corneal patch graft LEFT eye 10/3/17    HPI: High intraocular pressure following globe repair and steroids for rejection, left eye. 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 Presented with dehiscence of cornea at limbus from 9 o'clock to 3'oclock with extremely hypotonous eye, vitreous in the anterior chamber, dislocated IOL, and vitreous hemorrhage. Underwent resuturing of cornea to limbus and limited peritomy without evidence of scleral laceration.    Interval history:   Mild left eye soreness. Vision stable.     Past ocular history    PKP for keratoconus OU 2002  Trauma to OS with ruptured glob 2003  Repeat PKP OS 2004  Previous cataract surgery done at [facility] in [place].   Secondary IOL left eye in 2003.   H/o graft rejection left eye treated in 2011.  Ahmed tube with corneal patch graft LEFT eye 10/3/17    Gtts:  Pred twice daily left eye   Pred once every other day right eye  Latanoprost QHS left eye Combigan BID left eye   Testing Today:  LVC OS: MD -10.7. Non-specific defect     Assessment:    Glaucoma left eye, stage indeterminate  without visual field    Ahmed tube placement with corneal patch graft- doing well   Steroid responder   Possible epithelial ingrowth   Previous trauma   Extensive scarring at superior limbus   Intraocular pressure was in the 50's preop    IOP 16 today    S/p repair of globe rupture/corneal dehiscence left eye (1/14/17)   worsened AC reaction with rejection line vs. retrocorneal membrane from epi/fibrous ingrowth   VA improved with RGP with Dr. Joseph in past, however, he does not want to wear CL at this point. Still functioning with glasses   May  require repeat PKP after resolution of rejection +/- reposition of IOL, following with      AC vitreous prolapse OS   May need vitrectomy if tube clogs    Plan:   May be steroid  responder   Continue glaucoma drops as long as he is on steroids   Continue prednisolone twice daily per    Return to clinic with Matty 4-6 months for OCT rNFL and IOP check or sooner as needed    Mian Vincent MD  Ophthalmology, PGY-3    Attending Physician Attestation:  Complete documentation of historical and exam elements from today's encounter can be found in the full encounter summary report (not reduplicated in this progress note). I personally obtained the chief complaint(s) and history of present illness. I confirmed and edited asnecessary the review of systems, past medical/surgical history, family history, social history, and examination findings as documented by others; and I examined the patient myself. I personally reviewed the relevant tests, images, and reports as documented above. I formulated and edited as necessary the assessment and plan and discussed the findings and management plan with the patient and family.  - Yue Starr MD 12:54 PM 5/2/2018

## 2018-06-11 ENCOUNTER — OFFICE VISIT (OUTPATIENT)
Dept: OPHTHALMOLOGY | Facility: CLINIC | Age: 33
End: 2018-06-11
Attending: OPHTHALMOLOGY
Payer: COMMERCIAL

## 2018-06-11 DIAGNOSIS — T38.0X5A STEROID INDUCED GLAUCOMA, LEFT EYE: ICD-10-CM

## 2018-06-11 DIAGNOSIS — H40.32X0: ICD-10-CM

## 2018-06-11 DIAGNOSIS — H40.62X0 STEROID INDUCED GLAUCOMA, LEFT EYE: ICD-10-CM

## 2018-06-11 DIAGNOSIS — T38.0X5A STEROID INDUCED GLAUCOMA, BOTH EYES: Primary | ICD-10-CM

## 2018-06-11 DIAGNOSIS — H40.052 BORDERLINE GLAUCOMA WITH OCULAR HYPERTENSION, LEFT: ICD-10-CM

## 2018-06-11 DIAGNOSIS — T86.8409 CORNEAL TRANSPLANT REJECTION: ICD-10-CM

## 2018-06-11 DIAGNOSIS — Z94.7 HISTORY OF CORNEA TRANSPLANT: ICD-10-CM

## 2018-06-11 DIAGNOSIS — T86.8409 REJECTION OF CORNEA TRANSPLANT: ICD-10-CM

## 2018-06-11 DIAGNOSIS — H40.63X0 STEROID INDUCED GLAUCOMA, BOTH EYES: Primary | ICD-10-CM

## 2018-06-11 DIAGNOSIS — H40.32X0 GLAUCOMA ASSOCIATED WITH OCULAR TRAUMA OF LEFT EYE, UNSPECIFIED GLAUCOMA STAGE: ICD-10-CM

## 2018-06-11 DIAGNOSIS — S05.32XD RUPTURED GLOBE OF LEFT EYE, SUBSEQUENT ENCOUNTER: ICD-10-CM

## 2018-06-11 PROCEDURE — G0463 HOSPITAL OUTPT CLINIC VISIT: HCPCS | Mod: ZF

## 2018-06-11 RX ORDER — TIMOLOL MALEATE 5 MG/ML
1 SOLUTION/ DROPS OPHTHALMIC EVERY MORNING
Qty: 1 BOTTLE | Refills: 11 | Status: SHIPPED | OUTPATIENT
Start: 2018-06-11 | End: 2018-12-10

## 2018-06-11 RX ORDER — PREDNISOLONE ACETATE 10 MG/ML
1 SUSPENSION/ DROPS OPHTHALMIC DAILY
Qty: 1 BOTTLE | Refills: 3 | Status: SHIPPED | OUTPATIENT
Start: 2018-06-11 | End: 2019-02-15

## 2018-06-11 RX ORDER — BRIMONIDINE TARTRATE 2 MG/ML
1 SOLUTION/ DROPS OPHTHALMIC 2 TIMES DAILY
Qty: 1 BOTTLE | Refills: 11 | Status: SHIPPED | OUTPATIENT
Start: 2018-06-11 | End: 2018-09-05

## 2018-06-11 ASSESSMENT — PACHYMETRY
OD_CT(UM): 600
EXAM_DATE: 6/11/2018
OS_CT(UM): 741

## 2018-06-11 ASSESSMENT — REFRACTION_WEARINGRX
OD_AXIS: 125
OS_CYLINDER: +2.25
OD_CYLINDER: +3.25
OD_SPHERE: -6.50
OS_AXIS: 123
OS_SPHERE: -0.25

## 2018-06-11 ASSESSMENT — VISUAL ACUITY
CORRECTION_TYPE: GLASSES
OD_CC+: -1
OD_CC: 20/20
OS_CC: 20/400
METHOD: SNELLEN - LINEAR
OS_PH_CC: 20/250

## 2018-06-11 ASSESSMENT — TONOMETRY
OS_IOP_MMHG: 19
IOP_METHOD: ICARE
OD_IOP_MMHG: 16

## 2018-06-11 ASSESSMENT — SLIT LAMP EXAM - LIDS
COMMENTS: NORMAL
COMMENTS: PROTECTIVE PTOSIS

## 2018-06-11 ASSESSMENT — EXTERNAL EXAM - RIGHT EYE: OD_EXAM: NORMAL

## 2018-06-11 ASSESSMENT — CONF VISUAL FIELD
OS_INFERIOR_TEMPORAL_RESTRICTION: 3
OD_NORMAL: 1
OS_INFERIOR_NASAL_RESTRICTION: 3

## 2018-06-11 ASSESSMENT — EXTERNAL EXAM - LEFT EYE: OS_EXAM: NORMAL

## 2018-06-11 NOTE — MR AVS SNAPSHOT
After Visit Summary   6/11/2018    Enrique Gabriel    MRN: [id number]           Patient Information     Date Of Birth          1985        Visit Information        Provider Department      6/11/2018 10:00 AM Jose G Hou MD [facility]        Today's Diagnoses     Steroid induced glaucoma, both eyes    -  1    History of cornea transplant - Both Eyes        Glaucoma associated with ocular trauma, left, stage unspecified        Rejection of cornea transplant - Left Eye        Ruptured globe of left eye, subsequent encounter - Left Eye        Borderline glaucoma with ocular hypertension, left        Corneal transplant rejection        Glaucoma associated with ocular trauma of left eye, unspecified glaucoma stage        Steroid induced glaucoma, left eye           Follow-ups after your visit        Follow-up notes from your care team     Return in about 6 weeks (around 7/23/2018) for Follow up vision pressure OU.      Your next 10 appointments already scheduled     Sep 05, 2018 12:15 PM CDT   RETURN GLAUCOMA with Yue Starr MD   Eye Clinic ([facility])    [address] [address] [phone]              Who to contact     Please call your clinic at [phone] to:    Ask questions about your health    Make or cancel appointments    Discuss your medicines    Learn about your test results    Speak to your doctor            Additional Information About Your Visit        Cell[employer]hart Information     hulu gives you secure access to your electronic health record. If you see a primary care provider, you can also send messages to your care team and make appointments. If you have questions, please call your primary care clinic.  If you do not have a primary care provider, please call [phone] and they will assist you.      hulu is an electronic gateway that provides easy, online access to your medical records.  With [employer], you can request a clinic appointment, read your test results, renew a prescription or communicate with your care team.     To access your existing account, please contact your [facility] or call [phone] for assistance.        Care EveryWhere ID     This is your Care EveryWhere ID. This could be used by other organizations to access your [place] medical records  [id number]         Blood Pressure from Last 3 Encounters:   01/14/17 137/73    Weight from Last 3 Encounters:   01/13/17 81.6 kg (180 lb)              We Performed the Following     US OPHTHALMIC DIAG, PACHYMETRY          Today's Medication Changes          These changes are accurate as of 6/11/18 11:59 PM.  If you have any questions, ask your nurse or doctor.               Start taking these medicines.        Dose/Directions    brimonidine 0.2 % ophthalmic solution   Commonly known as:  ALPHAGAN   Used for:  Steroid induced glaucoma, both eyes, Glaucoma associated with ocular trauma, left, stage unspecified   Started by:  Jose G Hou MD        Dose:  1 drop   Place 1 drop Into the left eye 2 times daily   Quantity:  1 Bottle   Refills:  11       timolol 0.5 % ophthalmic solution   Commonly known as:  TIMOPTIC   Used for:  Steroid induced glaucoma, both eyes, Glaucoma associated with ocular trauma, left, stage unspecified   Started by:  Jose G Hou MD        Dose:  1 drop   Place 1 drop Into the left eye every morning   Quantity:  1 Bottle   Refills:  11         These medicines have changed or have updated prescriptions.        Dose/Directions    * prednisoLONE acetate 1 % ophthalmic susp   Commonly known as:  PRED FORTE   This may have changed:  Another medication with the same name was changed. Make sure you understand how and when to take each.   Used for:  Ruptured globe, left, initial encounter   Changed by:  Jose G Hou MD        Dose:  1 drop   Place 1 drop Into the left  eye 4 times daily   Quantity:  10 mL   Refills:  1       * prednisoLONE acetate 1 % ophthalmic susp   Commonly known as:  PRED FORTE   This may have changed:  when to take this   Used for:  History of cornea transplant, Rejection of cornea transplant   Changed by:  Jose G Hou MD        Dose:  1 drop   Place 1 drop Into the left eye daily   Quantity:  1 Bottle   Refills:  3       * Notice:  This list has 2 medication(s) that are the same as other medications prescribed for you. Read the directions carefully, and ask your doctor or other care provider to review them with you.         Where to get your medicines      These medications were sent to [facility] [place] [address]  [address] , [facility] [address]     Phone:  [phone]     brimonidine 0.2 % ophthalmic solution    prednisoLONE acetate 1 % ophthalmic susp    timolol 0.5 % ophthalmic solution                Primary Care Provider Office Phone # Fax #    Torsten HOUSER [place] [phone]       [facility] [address]  [facility] [address]        Equal Access to Services     CHRISSY GERONIMO AH: Hadii efrain ku hadasho Soomaali, waaxda luqadaha, qaybta kaalmada adeegyada, waxay idiin haysarahin ignacia kennedy . So [facility] [phone].    ATENCIÓN: Si habla español, tiene a shepherd disposición servicios gratuitos de asistencia lingüística. Llame al [phone].    We comply with applicable federal civil rights laws and [place] laws. We do not discriminate on the basis of race, color, national origin, age, disability, sex, sexual orientation, or gender identity.            Thank you!     Thank you for choosing EYE CLINIC  for your care. Our goal is always to provide you with excellent care. Hearing back from our patients is one way we can continue to improve our services. Please take a few minutes to complete the written survey that you may receive in the mail after your visit with us. Thank you!              Your Updated Medication List - Protect others around you: Learn how to safely use, store and throw away your medicines at www.disposemymeds.org.          This list is accurate as of 6/11/18 11:59 PM.  Always use your most recent med list.                   Brand Name Dispense Instructions for use Diagnosis    brimonidine 0.2 % ophthalmic solution    ALPHAGAN    1 Bottle    Place 1 drop Into the left eye 2 times daily    Steroid induced glaucoma, both eyes, Glaucoma associated with ocular trauma, left, stage unspecified       brimonidine-timolol 0.2-0.5 % ophthalmic solution    COMBIGAN    1 Bottle    Place 1 drop Into the left eye 2 times daily    Rejection of cornea transplant, History of cornea transplant, Ruptured globe of left eye, subsequent encounter, Borderline glaucoma with ocular hypertension, left       hypromellose 0.3 % Gel ophthalmic gel    GENTEAL    10 g    Place 0.5 g (0.5 inches) Into the left eye At Bedtime Apply approximately a half inch ribbon of ointment to the inside of your lower lids. Warning, application of the ointment to your eyes will cause temporary blurring of your vision from the ointment coating your eye. Please apply right before bedtime.    Ruptured globe of left eye, initial encounter       latanoprost 0.005 % ophthalmic solution    XALATAN    1 Bottle    Place 1 drop Into the left eye At Bedtime    Glaucoma associated with ocular trauma, left, stage unspecified       * prednisoLONE acetate 1 % ophthalmic susp    PRED FORTE    10 mL    Place 1 drop Into the left eye 4 times daily    Ruptured globe, left, initial encounter       * prednisoLONE acetate 1 % ophthalmic susp    PRED FORTE    1 Bottle    Place 1 drop Into the left eye daily    History of cornea transplant, Rejection of cornea transplant       timolol 0.5 % ophthalmic solution    TIMOPTIC    1 Bottle    Place 1 drop Into the left eye every morning    Steroid induced glaucoma, both eyes, Glaucoma associated with  ocular trauma, left, stage unspecified       * Notice:  This list has 2 medication(s) that are the same as other medications prescribed for you. Read the directions carefully, and ask your doctor or other care provider to review them with you.

## 2018-06-11 NOTE — PROGRESS NOTES
CC: Post op globe repair left eye    HPI: Enrique Gabriel is a 31 year old male s/p globe repair, left eye. 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 Presented with dehiscence of cornea at limbus from 9 o'clock to 3'oclock with extremely hypotonous eye, vitreous in the anterior chamber, dislocated IOL, and vitreous hemorrhage. questionable Retinal detachment. Underwent resuturing of cornea to limbus and limited peritomy without evidence of scleral laceration.    Interval history:  He underwent left Ahmed tube with Dr. Starr on 10/3/17. Eyes are comfortable without pain or significant redness since last encounter. Eyes are feeling most comfortable. Patient reports good compliance with gtts. Vision has been stable.     POHx;  PKP for keratoconus OU 2002  Trauma to OS with ruptured glob 2003  Repeat PKP OS 2004  Previous cataract surgery done at [facility] in [place].   Secondary IOL left eye in 2003.   H/o graft rejection left eye treated in 2011.  Ahmed tube OS 10/3/17    Gtts:  Combigan BID left eye   Latanoprost at bedtime left eye   Prednisolone twice a day left eye   Prednisolone every other day right eye     Assessment/Plan:    1.  s/p repair of globe rupture/corneal dehiscence left eye (1/14/17)   -graft clear centrally and inferiorly; fibrous ingrowth superotemporally    -Continue prednisolone twice a day left eye - improved AC inflammation in unicameral eye, but steroid responder   -Does not want to wear CL    -IOP controlled 19 today could consider pentrating keratoplasty (PKP)    - pachmetry today 741 from 649 in 5/18     2. AC vitreous prolapse OS   - monitor for now   - consider anterior vitrectomy if surgery  planned for #1      3. Traumatic Aniridia OS   - can consider simultaneous iris implant with #1    4. Ocular hypertension OS   s/p left Ahmed valve   Continue intraocular pressure  gtts as above, patient scheduled to see Dr. Starr early may.     IOP 19 today    Continue to follow-up with Dr. Starr    Return to clinic in as scheduled with Dr. Starr, return to clinic  2 months, if intraocular pressure stable with Dr. Starr consider repeat pentrating keratoplasty (PKP) left eye     PRECIOUS [place]  Cornea fellow    ~~~~~~~~~~~~~~~~~~~~~~~~~~~~~~~~~~~~~~~~~~~~~~~~~~~~~~~~~~~~~~~~    Complete documentation of historical and exam elements from today's encounter can be found in the full encounter summary report (not reduplicated in this progress note). I personally obtained the chief complaint(s) and history of present illness.  I confirmed and edited as necessary the review of systems, past medical/surgical history, family history, social history, and examination findings as documented by others; and I examined the patient myself. I personally reviewed the relevant tests, images, and reports as documented above. I formulated and edited as necessary the assessment and plan and discussed the findings and management plan with the patient and family.    Jose G Hou MD    --------------------------------------------------------------------------------------------------------------------------------------------  Pachymetry - Interpretation & Report  Indication: Corneal transplant failure OS  Performed by: Jose G Hou MD  Reliability: good  Patient cooperation: good  Findings:   Right eye:  600 micrometers centrally    Left eye:  741 micrometers centrally   Interval Change, Assessment, & Impact on treatment:   Right eye:  Slightly thicker   Left eye:  Worsening corneal edema   Signed: Jose G Huo 6/11/2018 12:40 PM

## 2018-06-11 NOTE — NURSING NOTE
Chief Complaints and History of Present Illnesses   Patient presents with     Follow Up For      s/p repair of globe rupture/corneal dehiscence left eye (1/14/17)     HPI    Affected eye(s):  Both   Symptoms:        Duration:  2 months   Frequency:  Constant       Do you have eye pain now?:  No      Comments:  Pt. States that he is doing well,.  No change in VA BE.  No c/o comfort BE.  Krystal Tompkins COT 10:04 AM June 11, 2018

## 2018-09-05 ENCOUNTER — OFFICE VISIT (OUTPATIENT)
Dept: OPHTHALMOLOGY | Facility: CLINIC | Age: 33
End: 2018-09-05
Attending: OPHTHALMOLOGY
Payer: COMMERCIAL

## 2018-09-05 DIAGNOSIS — H40.62X0 STEROID INDUCED GLAUCOMA, LEFT EYE: ICD-10-CM

## 2018-09-05 DIAGNOSIS — T38.0X5A STEROID INDUCED GLAUCOMA, LEFT EYE: ICD-10-CM

## 2018-09-05 DIAGNOSIS — H40.62X0 STEROID INDUCED GLAUCOMA, LEFT EYE: Primary | ICD-10-CM

## 2018-09-05 DIAGNOSIS — T38.0X5A STEROID INDUCED GLAUCOMA, LEFT EYE: Primary | ICD-10-CM

## 2018-09-05 PROCEDURE — G0463 HOSPITAL OUTPT CLINIC VISIT: HCPCS | Mod: ZF

## 2018-09-05 PROCEDURE — 92133 CPTRZD OPH DX IMG PST SGM ON: CPT | Mod: ZF | Performed by: OPHTHALMOLOGY

## 2018-09-05 RX ORDER — DORZOLAMIDE HYDROCHLORIDE AND TIMOLOL MALEATE 20; 5 MG/ML; MG/ML
1 SOLUTION/ DROPS OPHTHALMIC 2 TIMES DAILY
Qty: 1 BOTTLE | Refills: 11 | Status: SHIPPED | OUTPATIENT
Start: 2018-09-05 | End: 2019-02-18

## 2018-09-05 RX ORDER — BRIMONIDINE TARTRATE 2 MG/ML
1 SOLUTION/ DROPS OPHTHALMIC 2 TIMES DAILY
COMMUNITY
End: 2019-07-01

## 2018-09-05 ASSESSMENT — REFRACTION_WEARINGRX
OS_AXIS: 123
OD_AXIS: 125
OS_CYLINDER: +2.25
OD_SPHERE: -6.50
SPECS_TYPE: SVL
OD_CYLINDER: +3.25
OS_SPHERE: -0.25

## 2018-09-05 ASSESSMENT — VISUAL ACUITY
CORRECTION_TYPE: GLASSES
OS_CC: 20/400
OD_CC+: +1
METHOD: SNELLEN - LINEAR
OD_CC: 20/25-1
OS_PH_CC: 20/150

## 2018-09-05 ASSESSMENT — CUP TO DISC RATIO
OD_RATIO: 0.4
OS_RATIO: 0.5

## 2018-09-05 ASSESSMENT — TONOMETRY
OS_IOP_MMHG: 32
IOP_METHOD: APPLANATION
OD_IOP_MMHG: 17

## 2018-09-05 ASSESSMENT — CONF VISUAL FIELD
OS_INFERIOR_NASAL_RESTRICTION: 3
OS_INFERIOR_TEMPORAL_RESTRICTION: 3
OD_NORMAL: 1
METHOD: COUNTING FINGERS
OS_SUPERIOR_TEMPORAL_RESTRICTION: 3

## 2018-09-05 ASSESSMENT — SLIT LAMP EXAM - LIDS
COMMENTS: NORMAL
COMMENTS: PROTECTIVE PTOSIS

## 2018-09-05 ASSESSMENT — EXTERNAL EXAM - LEFT EYE: OS_EXAM: NORMAL

## 2018-09-05 ASSESSMENT — EXTERNAL EXAM - RIGHT EYE: OD_EXAM: NORMAL

## 2018-09-05 NOTE — PATIENT INSTRUCTIONS
Pred once every other day right eye and once a day left eye   Latanoprost at bedtime  left eye (teal)Dorzolamide-timolol left eye twice a day (orange)  Brimonidine left eye twice a day (Purple)    Stop timolol (yellow)

## 2018-09-05 NOTE — NURSING NOTE
Chief Complaints and History of Present Illnesses   Patient presents with     Glaucoma Follow Up     4mo bilateral IOP check, steroid responder glauc     HPI    Affected eye(s):  Both   Symptoms:     Blurred vision   No decreased vision   No distorted vision   Floaters   No flashes   No redness   No foreign body sensation      Duration:  4 months   Frequency:  Seldom       Do you have eye pain now?:  No      Comments:  Vision seems stable since LV but desires an updated Rx. Pt has preferred [facility] and plans to schedule an appt for an MRx after today.  Floaters RE>LE, longstanding and unchanging. No other comments or concerns per Pt.     Ocular meds: pred forte qDay OS/every other day OD, Xalatan qhs OS, timolol bid OS, Alphagan bid OS  Pt reports compliancy w/gtts, no side effects.     Lety Goncalves COT 11:50 AM September 5, 2018

## 2018-09-05 NOTE — PROGRESS NOTES
CC: Ahmed tube with corneal patch graft LEFT eye 10/3/17    HPI: High intraocular pressure following globe repair and steroids for rejection, left eye. 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 Presented with dehiscence of cornea at limbus from 9 o'clock to 3'oclock with extremely hypotonous eye, vitreous in the anterior chamber, dislocated IOL, and vitreous hemorrhage. Underwent resuturing of cornea to limbus and limited peritomy without evidence of scleral laceration.    Interval history:   Mild left eye soreness. Vision stable.     Past ocular history    PKP for keratoconus OU 2002  Trauma to OS with ruptured globe 2003  Repeat PKP OS 2004  Previous cataract surgery done at [facility] in [place].   Secondary IOL left eye in 2003.   H/o graft rejection left eye treated in 2011.  Ahmed tube with corneal patch graft LEFT eye 10/3/17    Gtts:  Pred once every other day right eye and once a day left eye   Latanoprost QHS left eye -changed to dorzolamide timolol left eye twice a day 9/5/18  Brimonidine left eye twice a day   Testing Today:  OCT retinal nerve fiber layer normal right eye, unable to get image left eye     Assessment:    Glaucoma left eye, stage indeterminate  without visual field    Ahmed tube placement with corneal patch graft- doing well   Steroid responder   Possible epithelial ingrowth   Previous trauma   Extensive scarring at superior limbus   Intraocular pressure was in the 50's preop    IOP 32 today-add dorzolamide     S/p repair of globe rupture/corneal dehiscence left eye (1/14/17)   worsened AC reaction with rejection line vs. retrocorneal membrane from epi/fibrous ingrowth   VA improved with RGP with Dr. Joseph in past, however, he does not want to wear CL at this point. Still functioning with glasses   May  require repeat PKP after resolution of rejection +/- reposition of  IOL, following with      AC vitreous prolapse OS   May need vitrectomy if tube clogs    Plan:   Return to clinic 1 month to [place]    Attending Physician Attestation:  Complete documentation of historical and exam elements from today's encounter can be found in the full encounter summary report (not reduplicated in this progress note). I personally obtained the chief complaint(s) and history of present illness. I confirmed and edited asnecessary the review of systems, past medical/surgical history, family history, social history, and examination findings as documented by others; and I examined the patient myself. I personally reviewed the relevant tests, images, and reports as documented above. I formulated and edited as necessary the assessment and plan and discussed the findings and management plan with the patient and family.  - Yue Starr MD 1:06 PM 9/5/2018

## 2018-09-05 NOTE — MR AVS SNAPSHOT
After Visit Summary   9/5/2018    Enrique Gabriel    MRN: [id number]           Patient Information     Date Of Birth          1985        Visit Information        Provider Department      9/5/2018 12:15 PM Yue Starr MD [facility]        Today's Diagnoses     Steroid induced glaucoma, left eye          Care Instructions    Pred once every other day right eye and once a day left eye   Latanoprost at bedtime  left eye (teal)Dorzolamide-timolol left eye twice a day (orange)  Brimonidine left eye twice a day (Purple)    Stop timolol (yellow)          Follow-ups after your visit        Follow-up notes from your care team     Return in about 4 weeks (around 10/3/2018) for iop check.      Your next 10 appointments already scheduled     Oct 01, 2018  7:30 AM CDT   RETURN GLAUCOMA with Yue Starr MD   Eye Clinic ([facility])    [address] [facility] [address] [place] [id number] [phone]            Oct 01, 2018  8:30 AM CDT   RETURN CORNEA with Jose G Hou MD   Eye Clinic ([facility])    [address] [address]   [phone]              Who to contact     Please call your clinic at [phone] to:    Ask questions about your health    Make or cancel appointments    Discuss your medicines    Learn about your test results    Speak to your doctor            Additional Information About Your Visit        MyChart Information     idio gives you secure access to your electronic health record. If you see a primary care provider, you can also send messages to your care team and make appointments. If you have questions, please call your primary care clinic.  If you do not have a primary care provider, please call [phone] and they will assist you.      idio is an electronic gateway that provides easy, online access to your medical records. With idio,  you can request a clinic appointment, read your test results, renew a prescription or communicate with your care team.     To access your existing account, please contact your [facility] or call [phone] for assistance.        Care EveryWhere ID     This is your Care EveryWhere ID. This could be used by other organizations to access your [place] medical records  [id number]         Blood Pressure from Last 3 Encounters:   01/14/17 137/73    Weight from Last 3 Encounters:   01/13/17 81.6 kg (180 lb)              We Performed the Following     OCT Optic Nerve RNFL Spectralis OU (both eyes)          Today's Medication Changes          These changes are accurate as of 9/5/18  1:34 PM.  If you have any questions, ask your nurse or doctor.               Start taking these medicines.        Dose/Directions    dorzolamide-timolol 2-0.5 % ophthalmic solution   Commonly known as:  COSOPT   Used for:  Steroid induced glaucoma, left eye   Started by:  Yue Starr MD        Dose:  1 drop   Place 1 drop Into the left eye 2 times daily   Quantity:  1 Bottle   Refills:  11         Stop taking these medicines if you haven't already. Please contact your care team if you have questions.     brimonidine-timolol 0.2-0.5 % ophthalmic solution   Commonly known as:  COMBIGAN   Stopped by:  Yue Starr MD                Where to get your medicines      These medications were sent to [facility] [place] [address] [facility] [address]     Phone:  [phone]     dorzolamide-timolol 2-0.5 % ophthalmic solution                Primary Care Provider Office Phone # Fax #    Torsten CORRINA Meehan [phone]       [facility] [address]  [facility] [address]        Equal Access to Services     CHRISSY GERONIMO AH: Autumn Sánchez, sandrita chandler, curt mccurdy.  So [facility] [phone].    ATENCIÓN: Si maria elena khanna, tiene a shepherd disposición servicios gratuitos de asistencia lingüística. Khanh al [phone].    We comply with applicable federal civil rights laws and [place] laws. We do not discriminate on the basis of race, color, national origin, age, disability, sex, sexual orientation, or gender identity.            Thank you!     Thank you for choosing [facility]  for your care. Our goal is always to provide you with excellent care. Hearing back from our patients is one way we can continue to improve our services. Please take a few minutes to complete the written survey that you may receive in the mail after your visit with us. Thank you!             Your Updated Medication List - Protect others around you: Learn how to safely use, store and throw away your medicines at www.disposemymeds.org.          This list is accurate as of 9/5/18  1:34 PM.  Always use your most recent med list.                   Brand Name Dispense Instructions for use Diagnosis    brimonidine 0.2 % ophthalmic solution    ALPHAGAN     Apply 1 drop to eye 2 times daily        dorzolamide-timolol 2-0.5 % ophthalmic solution    COSOPT    1 Bottle    Place 1 drop Into the left eye 2 times daily    Steroid induced glaucoma, left eye       hypromellose 0.3 % Gel ophthalmic gel    GENTEAL    10 g    Place 0.5 g (0.5 inches) Into the left eye At Bedtime Apply approximately a half inch ribbon of ointment to the inside of your lower lids. Warning, application of the ointment to your eyes will cause temporary blurring of your vision from the ointment coating your eye. Please apply right before bedtime.    Ruptured globe of left eye, initial encounter       latanoprost 0.005 % ophthalmic solution    XALATAN    1 Bottle    Place 1 drop Into the left eye At Bedtime    Glaucoma associated with ocular trauma, left, stage unspecified       prednisoLONE acetate 1 % ophthalmic susp    PRED FORTE    1 Bottle    Place 1  drop Into the left eye daily    History of cornea transplant, Rejection of cornea transplant       timolol 0.5 % ophthalmic solution    TIMOPTIC    1 Bottle    Place 1 drop Into the left eye every morning    Steroid induced glaucoma, both eyes, Glaucoma associated with ocular trauma, left, stage unspecified

## 2018-10-01 ENCOUNTER — OFFICE VISIT (OUTPATIENT)
Dept: OPHTHALMOLOGY | Facility: CLINIC | Age: 33
End: 2018-10-01
Attending: OPHTHALMOLOGY
Payer: COMMERCIAL

## 2018-10-01 ENCOUNTER — TELEPHONE (OUTPATIENT)
Dept: OPHTHALMOLOGY | Facility: CLINIC | Age: 33
End: 2018-10-01

## 2018-10-01 DIAGNOSIS — H40.32X0: Primary | ICD-10-CM

## 2018-10-01 DIAGNOSIS — Z94.7 HISTORY OF CORNEA TRANSPLANT: Primary | ICD-10-CM

## 2018-10-01 DIAGNOSIS — H40.32X0: ICD-10-CM

## 2018-10-01 DIAGNOSIS — T38.0X5A STEROID INDUCED GLAUCOMA, LEFT EYE: ICD-10-CM

## 2018-10-01 DIAGNOSIS — H40.62X0 STEROID INDUCED GLAUCOMA, LEFT EYE: ICD-10-CM

## 2018-10-01 DIAGNOSIS — T86.8409 REJECTION OF CORNEA TRANSPLANT: ICD-10-CM

## 2018-10-01 DIAGNOSIS — S05.32XD RUPTURED GLOBE OF LEFT EYE, SUBSEQUENT ENCOUNTER: ICD-10-CM

## 2018-10-01 PROCEDURE — G0463 HOSPITAL OUTPT CLINIC VISIT: HCPCS | Mod: ZF

## 2018-10-01 PROCEDURE — 40000269 ZZH STATISTIC NO CHARGE FACILITY FEE: Mod: ZF

## 2018-10-01 ASSESSMENT — VISUAL ACUITY
METHOD: SNELLEN - LINEAR
CORRECTION_TYPE: CONTACTS
OS_SC: 20/400
OS_PH_SC: 20/200
METHOD: SNELLEN - LINEAR
OD_CC: 20/30
OS_PH_SC: 20/200
OD_PH_CC: 20/25
OS_SC: 20/400
OD_PH_CC: 20/25
OD_CC: 20/30
CORRECTION_TYPE: CONTACTS

## 2018-10-01 ASSESSMENT — PACHYMETRY
OS_CT(UM): 913
OD_CT(UM): 587

## 2018-10-01 ASSESSMENT — CUP TO DISC RATIO
OS_RATIO: 0.5
OD_RATIO: 0.4
OD_RATIO: 0.4
OS_RATIO: 0.5

## 2018-10-01 ASSESSMENT — EXTERNAL EXAM - RIGHT EYE
OD_EXAM: NORMAL
OD_EXAM: NORMAL

## 2018-10-01 ASSESSMENT — SLIT LAMP EXAM - LIDS
COMMENTS: NORMAL
COMMENTS: NORMAL
COMMENTS: PROTECTIVE PTOSIS
COMMENTS: PROTECTIVE PTOSIS

## 2018-10-01 ASSESSMENT — TONOMETRY
IOP_METHOD: APPLANATION
OS_IOP_MMHG: 13
IOP_METHOD: APPLANATION
OD_IOP_MMHG: 17
OS_IOP_MMHG: 13
OD_IOP_MMHG: 17

## 2018-10-01 ASSESSMENT — CONF VISUAL FIELD
OS_NORMAL: 1
OS_NORMAL: 1
OD_NORMAL: 1
OD_NORMAL: 1

## 2018-10-01 ASSESSMENT — EXTERNAL EXAM - LEFT EYE
OS_EXAM: NORMAL
OS_EXAM: NORMAL

## 2018-10-01 NOTE — TELEPHONE ENCOUNTER
Patient is scheduled for surgery with       Spoke or left message with: patient    Date of Surgery: 11/27/18    Location: [facility]    Informed patient they will need an adult  yes    Pre-op with surgeon (if applicable): HAMZAH    H&P: Scheduled with Dr. Torsten Meehan [phone] patient will call and schedule appointment.    Additional imaging/appointments: HAMZAH    Surgery packet: gave to patient on 10/1/18    Additional comments: Advised RN will call 1 - 3 days prior to surgery with arrival time and instructions. Cornea ordered from [facility]

## 2018-10-01 NOTE — PROGRESS NOTES
CC: Post op globe repair left eye    HPI: Enrique Gabriel is a 31 year old male s/p globe repair, left eye. 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 Presented with dehiscence of cornea at limbus from 9 o'clock to 3'oclock with extremely hypotonous eye, vitreous in the anterior chamber, dislocated IOL, and vitreous hemorrhage. questionable Retinal detachment. Underwent resuturing of cornea to limbus and limited peritomy without evidence of scleral laceration.    Interval history:  Vision is the same as last visit.  Using drops as instructed.      POHx;  PKP for keratoconus OU 2002  Trauma to OS with ruptured glob 2003  Repeat PKP OS 2004  Previous cataract surgery done at [facility] in [place].   Secondary IOL left eye in 2003.   H/o graft rejection left eye treated in 2011.  Ahmed tube OS 10/3/17    Gtts:  Combigan BID left eye   Cosopt BID left eye  Latanoprost at bedtime left eye   Prednisolone twice a day left eye   Prednisolone every other day right eye     Assessment/Plan:    1.  s/p repair of globe rupture/corneal dehiscence left eye (1/14/17)   -graft clear centrally and inferiorly; fibrous ingrowth superotemporally    -Continue prednisolone once daily left eye    -Does not want to wear CL    -IOP controlled 17 today    -pachy today with significant worsening   -discussed DSAEK OS vs PKP OS - patient wishes to proceed with DSAEK OS   -will plan for DSAEK/ant vit OS   -partially eccentric sutured? IOL OS - would defer manipulation of IOL if stable since minimal tilt and covering visual axis.   - would not trim glaucoma tube since AC is deep and trimming the tube may expose it to vitreous.     2. AC vitreous prolapse OS   - monitor for now   - consider anterior vitrectomy if surgery planned for #1      3. Traumatic Aniridia OS   - can consider simultaneous iris implant with #1    4. Glaucoma,  OS   s/p left Ahmed valve   Continue intraocular pressure gtts as above, patient scheduled to see Dr. Starr early may.     Steroid responder   IOP 17 today    Continue to follow-up with Dr. Starr    Return to clinic: 6 months undilated vision / pressure     Eldon Xie M.D.  PGY-3, Ophthalmology     Attending Physician Attestation:  Complete documentation of historical and exam elements from today's encounter can be found in the full encounter summary report (not reduplicated in this progress note).  I personally obtained the chief complaint(s) and history of present illness.  I confirmed and edited as necessary the review of systems, past medical/surgical history, family history, social history, and examination findings as documented by others; and I examined the patient myself.  I personally reviewed the relevant tests, images, and reports as documented above.  I formulated and edited as necessary the assessment and plan and discussed the findings and management plan with the patient and family. - Jose G Hou MD    --------------------------------------------------------------------------------------------------------------------------------------------  Pachymetry - Interpretation & Report  Indication: corneal transplant failure OS  Performed by: Jose G Hou MD  Reliability: good  Patient cooperation: good  Findings:   Right eye:  587 micrometers centrally    Left eye:  913 micrometers centrally   Interval Change, Assessment, & Impact on treatment:   Right eye:  Thin, no edema   Left eye:  Thicker, edematous   Signed: Jose G Hou 10/1/2018 8:46 AM

## 2018-10-01 NOTE — NURSING NOTE
Chief Complaints and History of Present Illnesses   Patient presents with     Follow Up For     Steroid induced glaucoma, both eyes (Primary Dx);      HPI    Affected eye(s):  Both   Symptoms:     No blurred vision   No decreased vision   No floaters   No flashes      Frequency:  Constant       Do you have eye pain now?:  No      Comments:  States va is the same since last visit  Pred once every other day right eye and once a day left eye   Latanoprost QHS left eye   Timolol left eye every morning-changed to dorzolamide timolol left eye twice a day 9/5/18  Brimonidine left eye twice a day   Payam Valero  7:50 AM October 1, 2018

## 2018-10-01 NOTE — MR AVS SNAPSHOT
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      Your next 10 appointments already scheduled     Nov 26, 2018  2:15 PM CST   RETURN CORNEA with Jose G Hou MD   Eye Clinic ([facility])    [address] [address]   [phone]            Nov 27, 2018   Procedure with Jose G Hou MD   [facility] ([facility])    [address] [place] [address]  [address] [place] [id number]   [phone]           Located in the Clinics and Surgery Center at [address] [place] [address].   parking is very convenient and highly recommended.  is a $6 flat rate fee.  Both  and self parkers should enter the main arrival plaza from [facility]; parking attendants will direct you based on your parking preference.            Nov 28, 2018  8:15 AM CST   Post-Op with Jose G Hou MD   Eye Clinic ([facility])    [address] [address]   [phone]            Dec 03, 2018  8:15 AM CST   Post-Op with Jose G Hou MD   Eye Clinic ([facility])    [address] Clin  [address]  [place] [address]   [phone]            Jan 07, 2019  8:15 AM CST   Post-Op with Jose G Hou MD   Eye Clinic ([facility])    [address] Clin [address] [place] [address]   [phone]            Apr 01, 2019 10:15 AM CDT   RETURN GLAUCOMA with Yue Starr MD   Eye Clinic ([facility])    [address] [address]   [phone]              Who to contact     Please call your clinic at [phone] to:    Ask questions about your health    Make or cancel appointments    Discuss your medicines    Learn about your test results    Speak to your doctor            Additional Information About Your Visit        Fair and [email or web address] Information     [employer] gives you secure access to your electronic health record. If you see a primary care provider, you can also send messages to your care team and make appointments. If you have questions, please call your primary care clinic.  If you do not have a primary care provider, please call [phone] and they will assist you.      [employer]a is an electronic gateway that provides easy, online access to your medical records. With [employer], you can request a clinic appointment, read your test results, renew a prescription or communicate with your care team.     To access your existing account, please contact your [facility] or call [phone] for assistance.        Care EveryWhere ID     This is your Care EveryWhere ID. This could be used by other organizations to access your [place] medical records  [id number]         Blood Pressure from Last 3 Encounters:   01/14/17 137/73    Weight from Last 3 Encounters:   01/13/17 81.6 kg (180 lb)              We Performed the Following     Marina-Operative Worksheet     US OPHTHALMIC DIAG, PACHYMETRY        Primary Care Provider Office Phone # Fax #    Torsten Meehan  [phone]       [facility] [address]  [facility] [address]        Equal Access to Services     CHRISSY GERONIMO : Hadii aad ku hadfabianhugo Kiaraali, kemalda ritajose, kwabena kadonna tolliver, curt gaganin hayaan myrnafabby meyer laMelquiadesjoceline dickensLex [facility] [phone].    ATENCIÓN: Si habla español, tiene a shepherd disposición servicios gratuitos de asistencia lingüística. Khanh al [phone].    We comply with applicable federal civil rights laws and [place] laws. We do not discriminate on the basis of race, color, national origin, age, disability, sex, sexual orientation, or gender identity.            Thank you!     Thank you for choosing EYE CLINIC  for your care. Our goal is always to provide you with excellent care. Hearing back from our patients is one way we can continue to improve our services. Please take a few minutes to complete the written survey that you may receive in the mail after your visit with us. Thank you!             Your Updated Medication List - Protect others around you: Learn how to safely use, store and throw away your medicines at www.disposemymeds.org.          This list is accurate as of 10/1/18  9:29 AM.  Always use your most recent med list.                   Brand Name Dispense Instructions for use Diagnosis    brimonidine 0.2 % ophthalmic solution    ALPHAGAN     Apply 1 drop to eye 2 times daily        dorzolamide-timolol 2-0.5 % ophthalmic solution    COSOPT    1 Bottle    Place 1 drop Into the left eye 2 times daily    Steroid induced glaucoma, left eye       hypromellose 0.3 % Gel ophthalmic gel    GENTEAL    10 g    Place 0.5 g (0.5 inches) Into the left eye At Bedtime Apply approximately a half inch ribbon of ointment to the inside of your lower lids. Warning, application of the ointment to your eyes will cause temporary blurring of your vision from the ointment coating your eye. Please apply right before bedtime.    Ruptured globe of left eye, initial encounter        latanoprost 0.005 % ophthalmic solution    XALATAN    1 Bottle    Place 1 drop Into the left eye At Bedtime    Glaucoma associated with ocular trauma, left, stage unspecified       prednisoLONE acetate 1 % ophthalmic susp    PRED FORTE    1 Bottle    Place 1 drop Into the left eye daily    History of cornea transplant, Rejection of cornea transplant       timolol 0.5 % ophthalmic solution    TIMOPTIC    1 Bottle    Place 1 drop Into the left eye every morning    Steroid induced glaucoma, both eyes, Glaucoma associated with ocular trauma, left, stage unspecified

## 2018-10-01 NOTE — PROGRESS NOTES
CC: Ahmed tube with corneal patch graft LEFT eye 10/3/17    HPI: High intraocular pressure following globe repair and steroids for rejection, left eye. 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 Presented with dehiscence of cornea at limbus from 9 o'clock to 3'oclock with extremely hypotonous eye, vitreous in the anterior chamber, dislocated IOL, and vitreous hemorrhage. Underwent resuturing of cornea to limbus and limited peritomy without evidence of scleral laceration.    Interval history:   Mild left eye soreness. Vision stable.     Past ocular history    PKP for keratoconus OU 2002  Trauma to OS with ruptured globe 2003  Repeat PKP OS 2004  Previous cataract surgery done at [facility] in [place].   Secondary IOL left eye in 2003.   H/o graft rejection left eye treated in 2011.  Ahmed tube with corneal patch graft LEFT eye 10/3/17    Gtts:  Pred once every other day right eye and once a day left eye   Latanoprost QHS left eye -changed to dorzolamide timolol left eye twice a day 9/5/18  Brimonidine left eye twice a day   Testing 9/5/18:  OCT retinal nerve fiber layer normal right eye, unable to get image left eye     Assessment:    Glaucoma left eye, stage indeterminate  without visual field    Ahmed tube placement with corneal patch graft- doing well   Steroid responder   Possible epithelial ingrowth   Previous trauma   Extensive scarring at superior limbus   Intraocular pressure was in the 50's preop    intraocular pressure 13 today     S/p repair of globe rupture/corneal dehiscence left eye (1/14/17)   worsened AC reaction with rejection line vs. retrocorneal membrane from epi/fibrous ingrowth   VA improved with RGP with Dr. Joseph in past, however, he does not want to wear CL at this point. Still functioning with glasses   May  require repeat PKP after resolution of rejection +/- reposition of  IOL, following with      AC vitreous prolapse OS   May need vitrectomy if tube clogs    Plan:   Return to clinic 6 month to [place] with LVC, sooner as needed if further surgery left eye    continue present medications      Attending Physician Attestation:  Complete documentation of historical and exam elements from today's encounter can be found in the full encounter summary report (not reduplicated in this progress note). I personally obtained the chief complaint(s) and history of present illness. I confirmed and edited asnecessary the review of systems, past medical/surgical history, family history, social history, and examination findings as documented by others; and I examined the patient myself. I personally reviewed the relevant tests, images, and reports as documented above. I formulated and edited as necessary the assessment and plan and discussed the findings and management plan with the patient and family.  - Yue Starr MD 8:09 AM 10/1/2018

## 2018-10-01 NOTE — MR AVS SNAPSHOT
After Visit Summary   10/1/2018    Enrique Gabriel    MRN: [id number]           Patient Information     Date Of Birth          1985        Visit Information        Provider Department      10/1/2018 7:30 AM Yue Starr MD [facility]        Today's Diagnoses     Glaucoma associated with ocular trauma, left, stage unspecified    -  1       Follow-ups after your visit        Follow-up notes from your care team     Return in about 6 months (around 4/1/2019) for visual field LVC os .      Your next 10 appointments already scheduled     Oct 01, 2018  8:30 AM CDT   RETURN CORNEA with Jose G Hou MD   Eye Clinic ([facility])    [address]  [address] Clin [address]  [facility] [address] [phone]              Who to contact     Please call your clinic at [phone] to:    Ask questions about your health    Make or cancel appointments    Discuss your medicines    Learn about your test results    Speak to your doctor            Additional Information About Your Visit        MyChart Information     Aud[employer] gives you secure access to your electronic health record. If you see a primary care provider, you can also send messages to your care team and make appointments. If you have questions, please call your primary care clinic.  If you do not have a primary care provider, please call [phone] and they will assist you.      [employer] is an electronic gateway that provides easy, online access to your medical records. With [employer], you can request a clinic appointment, read your test results, renew a prescription or communicate with your care team.     To access your existing account, please contact your [facility] or call [phone] for assistance.        Care EveryWhere ID     This is your Care EveryWhere ID. This could be used by other organizations to access your [place] medical records  [id number]         Blood  Pressure from Last 3 Encounters:   01/14/17 137/73    Weight from Last 3 Encounters:   01/13/17 81.6 kg (180 lb)              Today, you had the following     No orders found for display       Primary Care Provider Office Phone # Fax #    Torsten Meehan [phone]       [facility] [address]  [facility] [address]        Equal Access to Services     CHRISSY GERONIMO : Hadii aad ku hadasho Soomaali, waaxda luqadaha, qaybta kaalmada adeegyada, waxay idiin hayaan adeeg kharash la'aan ah. So [facility] [phone].    ATENCIÓN: Si habla español, tiene a shepherd disposición servicios gratuitos de asistencia lingüística. Llame al [phone].    We comply with applicable federal civil rights laws and [place] laws. We do not discriminate on the basis of race, color, national origin, age, disability, sex, sexual orientation, or gender identity.            Thank you!     Thank you for choosing EYE CLINIC  for your care. Our goal is always to provide you with excellent care. Hearing back from our patients is one way we can continue to improve our services. Please take a few minutes to complete the written survey that you may receive in the mail after your visit with us. Thank you!             Your Updated Medication List - Protect others around you: Learn how to safely use, store and throw away your medicines at www.disposemymeds.org.          This list is accurate as of 10/1/18  8:17 AM.  Always use your most recent med list.                   Brand Name Dispense Instructions for use Diagnosis    brimonidine 0.2 % ophthalmic solution    ALPHAGAN     Apply 1 drop to eye 2 times daily        dorzolamide-timolol 2-0.5 % ophthalmic solution    COSOPT    1 Bottle    Place 1 drop Into the left eye 2 times daily    Steroid induced glaucoma, left eye       hypromellose 0.3 % Gel ophthalmic gel    GENTEAL    10 g    Place 0.5 g (0.5 inches) Into the left eye At Bedtime Apply approximately a half inch ribbon of  ointment to the inside of your lower lids. Warning, application of the ointment to your eyes will cause temporary blurring of your vision from the ointment coating your eye. Please apply right before bedtime.    Ruptured globe of left eye, initial encounter       latanoprost 0.005 % ophthalmic solution    XALATAN    1 Bottle    Place 1 drop Into the left eye At Bedtime    Glaucoma associated with ocular trauma, left, stage unspecified       prednisoLONE acetate 1 % ophthalmic susp    PRED FORTE    1 Bottle    Place 1 drop Into the left eye daily    History of cornea transplant, Rejection of cornea transplant       timolol 0.5 % ophthalmic solution    TIMOPTIC    1 Bottle    Place 1 drop Into the left eye every morning    Steroid induced glaucoma, both eyes, Glaucoma associated with ocular trauma, left, stage unspecified

## 2018-10-01 NOTE — NURSING NOTE
Chief Complaints and History of Present Illnesses   Patient presents with     Follow Up For     Steroid induced glaucoma, left eye (Primary Dx)     HPI    Affected eye(s):  Both   Symptoms:     No blurred vision   No decreased vision   No floaters   No flashes      Duration:  4 weeks         Comments:  States va is the same since last visit  Pred once every other day right eye and once a day left eye   Latanoprost QHS left eye    dorzolamide timolol left eye twice a day 9/5/18  Brimonidine left eye twice a day   Payam Valero  7:34 AM October 1, 2018

## 2018-11-23 ENCOUNTER — ANESTHESIA EVENT (OUTPATIENT)
Dept: SURGERY | Facility: AMBULATORY SURGERY CENTER | Age: 33
End: 2018-11-23

## 2018-11-23 ASSESSMENT — LIFESTYLE VARIABLES: TOBACCO_USE: 1

## 2018-11-23 NOTE — ANESTHESIA PREPROCEDURE EVALUATION
Anesthesia Evaluation     . Pt has had prior anesthetic. Type: General           ROS/MED HX    ENT/Pulmonary: Comment: Previous ruptured globe - neg pulmonary ROS   (+)tobacco use, Current use 0.5 packs/day  , . .    Neurologic:  - neg neurologic ROS     Cardiovascular:     (+) hypertension----. : . . . :. .       METS/Exercise Tolerance:  >4 METS   Hematologic:  - neg hematologic  ROS       Musculoskeletal:  - neg musculoskeletal ROS       GI/Hepatic:         Renal/Genitourinary:  - ROS Renal section negative       Endo:  - neg endo ROS       Psychiatric:  - neg psychiatric ROS       Infectious Disease:  - neg infectious disease ROS       Malignancy:      - no malignancy   Other:                     Physical Exam  Normal systems: cardiovascular and pulmonary    Airway   Mallampati: II  TM distance: >3 FB  Neck ROM: full    Dental   (+) missing    Cardiovascular       Pulmonary                     Anesthesia Plan      History & Physical Review  History and physical reviewed and following examination; no interval change.    ASA Status:  2 .    NPO Status:  > 8 hours    Plan for General with Intravenous and Propofol induction. Maintenance will be Balanced.    PONV prophylaxis:  Ondansetron (or other 5HT-3)       Postoperative Care  Postoperative pain management:  IV analgesics.      Consents  Anesthetic plan, risks, benefits and alternatives discussed with: .  Use of blood products discussed: No .   .                         .        PHYSICAL EXAM:   Mental Status/Neuro: A/A/O   Airway:   Mallampati: I   Respiratory: Auscultation: CTAB     Resp. Rate: Normal     Resp. Effort: Normal      CV: Rhythm: Regular  Rate: Age appropriate  Heart: Normal Sounds   Comments:      Dental: Normal                  Assessment:   ASA SCORE: 2    NPO Status: > 6 hours since completed Solid [employer]   Documentation: H&P complete; Preop Testing complete; Consents complete   Proceeding: Proceed without further delay  Tobacco Use:  NO  Active use of Tobacco/UNKNOWN Tobacco use status     Plan:   Anes. Type:  MAC   Pre-Induction: Midazolam IV   Induction:  Not applicable   Airway: Native Airway   Access/Monitoring: PIV   Maintenance: N/a   Emergence: N/a   Logistics: Same Day Surgery     Postop Pain/Sedation Strategy:  Standard-Options: Opioids PRN     PONV Management:  Adult Risk Factors:, Non-Smoker, Postop Opioids  Prevention: Ondansetron     CONSENT: Direct conversation   Plan and risks discussed with: Patient   Blood Products: Consent Deferred (Minimal Blood Loss)     Comments for Plan/Consent:  Plan:  MAC with routine monitors    Sanchez Her MD

## 2018-11-26 ENCOUNTER — OFFICE VISIT (OUTPATIENT)
Dept: OPHTHALMOLOGY | Facility: CLINIC | Age: 33
End: 2018-11-26
Attending: OPHTHALMOLOGY
Payer: COMMERCIAL

## 2018-11-26 DIAGNOSIS — T86.8409 REJECTION OF CORNEA TRANSPLANT: ICD-10-CM

## 2018-11-26 DIAGNOSIS — H40.32X0: ICD-10-CM

## 2018-11-26 DIAGNOSIS — H40.62X0 STEROID INDUCED GLAUCOMA, LEFT EYE: ICD-10-CM

## 2018-11-26 DIAGNOSIS — H18.20 CORNEAL EDEMA OF LEFT EYE: Primary | ICD-10-CM

## 2018-11-26 DIAGNOSIS — T38.0X5A STEROID INDUCED GLAUCOMA, LEFT EYE: ICD-10-CM

## 2018-11-26 DIAGNOSIS — Z94.7 HISTORY OF CORNEA TRANSPLANT: Primary | ICD-10-CM

## 2018-11-26 DIAGNOSIS — S05.32XD RUPTURED GLOBE OF LEFT EYE, SUBSEQUENT ENCOUNTER: ICD-10-CM

## 2018-11-26 PROCEDURE — G0463 HOSPITAL OUTPT CLINIC VISIT: HCPCS | Mod: ZF

## 2018-11-26 RX ORDER — OFLOXACIN 3 MG/ML
1 SOLUTION/ DROPS OPHTHALMIC 4 TIMES DAILY
Qty: 1 BOTTLE | Refills: 0 | Status: SHIPPED | OUTPATIENT
Start: 2018-11-26 | End: 2019-02-18

## 2018-11-26 RX ORDER — PREDNISOLONE ACETATE 10 MG/ML
1 SUSPENSION/ DROPS OPHTHALMIC 4 TIMES DAILY
Qty: 5 ML | Refills: 0 | Status: SHIPPED | OUTPATIENT
Start: 2018-11-26 | End: 2019-02-15

## 2018-11-26 ASSESSMENT — PACHYMETRY
OD_CT(UM): 590
OS_CT(UM): 886

## 2018-11-26 ASSESSMENT — CONF VISUAL FIELD
OS_NORMAL: 1
OD_NORMAL: 1

## 2018-11-26 ASSESSMENT — VISUAL ACUITY
OS_PH_CC: 20/100
OD_CC: 20/30
METHOD: SNELLEN - LINEAR
OS_CC: 20/200
OD_PH_CC: 20/20

## 2018-11-26 ASSESSMENT — TONOMETRY
IOP_METHOD: TONOPEN
OS_IOP_MMHG: 22
OD_IOP_MMHG: 15

## 2018-11-26 ASSESSMENT — SLIT LAMP EXAM - LIDS
COMMENTS: PROTECTIVE PTOSIS
COMMENTS: NORMAL

## 2018-11-26 ASSESSMENT — CUP TO DISC RATIO
OD_RATIO: 0.4
OS_RATIO: 0.5

## 2018-11-26 ASSESSMENT — EXTERNAL EXAM - RIGHT EYE: OD_EXAM: NORMAL

## 2018-11-26 ASSESSMENT — EXTERNAL EXAM - LEFT EYE: OS_EXAM: NORMAL

## 2018-11-26 NOTE — NURSING NOTE
Chief Complaints and History of Present Illnesses   Patient presents with     Follow Up For        Corneal edema of left eye (Primary Dx)     HPI    Affected eye(s):  Left   Symptoms:     Blurred vision   Decreased vision      Duration:  6 weeks   Frequency:  Constant       Do you have eye pain now?:  No      Comments:  Blurry vision left eye, right eye stable  Combigan BID left eye   Cosopt BID left eye  Latanoprost at bedtime left eye   Prednisolone twice a day left eye   Prednisolone every other day right eye   Payam Valero  2:41 PM November 26, 2018

## 2018-11-26 NOTE — PROGRESS NOTES
CC: Post op globe repair left eye    HPI: Enrique Gabriel is a 33 year old male s/p globe repair, left eye. 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 Presented with dehiscence of cornea at limbus from 9 o'clock to 3'oclock with extremely hypotonous eye, vitreous in the anterior chamber, dislocated IOL, and vitreous hemorrhage. questionable Retinal detachment. Underwent resuturing of cornea to limbus and limited peritomy without evidence of scleral laceration.    Interval history:  Vision is the same as last visit.  Using drops as instructed.  No new flashes, floater, diplopia. No pain, redness, tearing.     POHx;  PKP for keratoconus OU 2002  Trauma to OS with ruptured glob 2003  Repeat PKP OS 2004  Previous cataract surgery done at [facility] in [place].   Secondary IOL left eye in 2003.   H/o graft rejection left eye treated in 2011.  Ahmed tube OS 10/3/17    Gtts:  Combigan BID left eye   Cosopt BID left eye  Latanoprost at bedtime left eye   Prednisolone twice a day left eye   Prednisolone every other day right eye     Assessment/Plan:    1.  s/p repair of globe rupture/corneal dehiscence left eye (1/14/17)   -graft clear centrally and inferiorly; fibrous ingrowth superotemporally    -Continue prednisolone once daily left eye    -Does not want to wear CL    -pachy today with significant worsening   -discussed DSAEK OS vs PKP OS - patient wishes to proceed with DSAEK OS   -will plan for DSAEK/ant vit OS   -partially eccentric sutured? IOL OS - would defer manipulation of IOL if stable since minimal tilt and covering visual axis.   -if lens is loose, would refixate superotemporal haptic   - would not trim glaucoma tube since AC is deep and trimming the tube may expose it to vitreous.     2. AC vitreous prolapse OS   - monitor for now   - consider anterior vitrectomy if surgery planned for #1       3. Traumatic Aniridia OS   - can consider simultaneous iris implant with #1    4. Glaucoma, OS   s/p left Ahmed valve   Continue intraocular pressure gtts as above, patient scheduled to see Dr. Starr early may.     Steroid responder   Continue to follow-up with Dr. Starr    Return to clinic: postop    John Solis MD  PGY-3 Ophthalmology Resident  [phone]      Attending Physician Attestation:  Complete documentation of historical and exam elements from today's encounter can be found in the full encounter summary report (not reduplicated in this progress note).  I personally obtained the chief complaint(s) and history of present illness.  I confirmed and edited as necessary the review of systems, past medical/surgical history, family history, social history, and examination findings as documented by others; and I examined the patient myself.  I personally reviewed the relevant tests, images, and reports as documented above.  I formulated and edited as necessary the assessment and plan and discussed the findings and management plan with the patient and family. - Jose G Hou MD    I personally spent great than 40min with the patient, of which >50% of the time was spent face to face with the patient, counseling and coordinating care with the patient. We discussed the complexity of his diagnosis, the need for further information prior to proceeding with yet another surgery, and the unknown prognosis for the patient at this time.    Jose G Hou MD    --------------------------------------------------------------------------------------------------------------------------------------------  Pachymetry - Interpretation & Report  Indication: corneal transplant failure OS  Performed by: Jose G Hou MD  Reliability: good  Patient cooperation: good  Findings:   Right eye:  590 micrometers centrally    Left eye:  886 micrometers centrally   Interval Change, Assessment, & Impact on treatment:   Right eye:   Thin, no edema   Left eye:  Thicker, edematous   Signed: Jose G Hou 11/26/2018 3:11 PM

## 2018-11-26 NOTE — MR AVS SNAPSHOT
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      Your next 10 appointments already scheduled     Nov 27, 2018   Procedure with Jose G Hou MD   [facility] ([facility])    [address] [place] [address]  [address]th [place] [id number] [phone]           Located in the Clinics and Surgery Center at [address] [place] [address].   parking is very convenient and highly recommended.  is a $6 flat rate fee.  Both  and self parkers should enter the main arrival plaza from [facility]; parking attendants will direct you based on your parking preference.            Nov 28, 2018  1:15 PM CST   Post-Op with Jose G Hou MD   Eye Clinic ([facility])    [address] [address]   [phone]            Dec 03, 2018  8:15 AM CST   Post-Op with Jose G Hou MD   Eye Clinic ([facility])    [address] [address]   [phone]            Jan 07, 2019  8:15 AM CST   Post-Op with Jose G Hou MD   Eye Clinic ([facility])    [address] [address] [address]   [phone]            Apr  01, 2019 10:15 AM CDT   RETURN GLAUCOMA with Yue Starr MD   Eye Clinic ([facility])    [place] [address]  9th Fl Clin 9a  [facility] [address]   [phone]              Who to contact     Please call your clinic at [phone] to:    Ask questions about your health    Make or cancel appointments    Discuss your medicines    Learn about your test results    Speak to your doctor            Additional Information About Your Visit        [employer] Information     [employer] gives you secure access to your electronic health record. If you see a primary care provider, you can also send messages to your care team and make appointments. If you have questions, please call your primary care clinic.  If you do not have a primary care provider, please call [phone] and they will assist you.      [employer] is an electronic gateway that provides easy, online access to your medical records. With [employer], you can request a clinic appointment, read your test results, renew a prescription or communicate with your care team.     To access your existing account, please contact your [facility] or call [phone] for assistance.        Care EveryWhere ID     This is your Care EveryWhere ID. This could be used by other organizations to access your [facility] medical records  [id number]         Blood Pressure from Last 3 Encounters:   01/14/17 137/73    Weight from Last 3 Encounters:   01/13/17 81.6 kg (180 lb)              We Performed the Following     US OPHTHALMIC DIAG, PACHYMETRY          Today's Medication Changes          These changes are accurate as of 11/26/18  3:16 PM.  If you have any questions, ask your nurse or doctor.               Start taking these medicines.        Dose/Directions    ofloxacin 0.3 % ophthalmic solution   Commonly known as:  OCUFLOX   Used for:  Corneal edema of left eye   Started by:  Jose G Hou MD        Dose:  1  drop   Place 1 drop Into the left eye 4 times daily   Quantity:  1 Bottle   Refills:  0         These medicines have changed or have updated prescriptions.        Dose/Directions    * prednisoLONE acetate 1 % ophthalmic suspension   Commonly known as:  PRED FORTE   This may have changed:  Another medication with the same name was added. Make sure you understand how and when to take each.   Used for:  History of cornea transplant, Rejection of cornea transplant   Changed by:  Jose G Hou MD        Dose:  1 drop   Place 1 drop Into the left eye daily   Quantity:  1 Bottle   Refills:  3       * prednisoLONE acetate 1 % ophthalmic suspension   Commonly known as:  PRED FORTE   This may have changed:  You were already taking a medication with the same name, and this prescription was added. Make sure you understand how and when to take each.   Used for:  Corneal edema of left eye   Changed by:  Jose G Hou MD        Dose:  1 drop   Place 1 drop Into the left eye 4 times daily   Quantity:  5 mL   Refills:  0       * Notice:  This list has 2 medication(s) that are the same as other medications prescribed for you. Read the directions carefully, and ask your doctor or other care provider to review them with you.         Where to get your medicines      These medications were sent to [address] [address]    Hours:  TRANSPLANT PHONE NUMBER [phone] Phone:  [phone]     ofloxacin 0.3 % ophthalmic solution    prednisoLONE acetate 1 % ophthalmic suspension                Primary Care Provider Office Phone # Fax #    Torsten CORRINA Meehan [phone]       [address] [address]        Equal Access to Services     CHRISSY GERONIMO AH: Autumn hawkinso Soedward, waaxda luqadaha, qaybta kaalcurt goncalves.  So [facility] [phone].    ATENCIÓN: Si maria elena khanna, tiene a shepherd disposición servicios gratuitos de asistencia lingüística. Khanh al [phone].    We comply with applicable federal civil rights laws and [place] laws. We do not discriminate on the basis of race, color, national origin, age, disability, sex, sexual orientation, or gender identity.            Thank you!     Thank you for choosing EYE CLINIC  for your care. Our goal is always to provide you with excellent care. Hearing back from our patients is one way we can continue to improve our services. Please take a few minutes to complete the written survey that you may receive in the mail after your visit with us. Thank you!             Your Updated Medication List - Protect others around you: Learn how to safely use, store and throw away your medicines at www.disposemymeds.org.          This list is accurate as of 11/26/18  3:16 PM.  Always use your most recent med list.                   Brand Name Dispense Instructions for use Diagnosis    brimonidine 0.2 % ophthalmic solution    ALPHAGAN     Apply 1 drop to eye 2 times daily        dorzolamide-timolol 2-0.5 % ophthalmic solution    COSOPT    1 Bottle    Place 1 drop Into the left eye 2 times daily    Steroid induced glaucoma, left eye       hypromellose 0.3 % Gel ophthalmic gel    GENTEAL    10 g    Place 0.5 g (0.5 inches) Into the left eye At Bedtime Apply approximately a half inch ribbon of ointment to the inside of your lower lids. Warning, application of the ointment to your eyes will cause temporary blurring of your vision from the ointment coating your eye. Please apply right before bedtime.    Ruptured globe of left eye, initial encounter       latanoprost 0.005 % ophthalmic solution    XALATAN    1 Bottle    Place 1 drop Into the left eye At Bedtime    Glaucoma associated with ocular trauma, left, stage unspecified       LISINOPRIL PO      Take 20 mg by mouth daily        ofloxacin 0.3 %  ophthalmic solution    OCUFLOX    1 Bottle    Place 1 drop Into the left eye 4 times daily    Corneal edema of left eye       * prednisoLONE acetate 1 % ophthalmic suspension    PRED FORTE    1 Bottle    Place 1 drop Into the left eye daily    History of cornea transplant, Rejection of cornea transplant       * prednisoLONE acetate 1 % ophthalmic suspension    PRED FORTE    5 mL    Place 1 drop Into the left eye 4 times daily    Corneal edema of left eye       timolol maleate 0.5 % ophthalmic solution    TIMOPTIC    1 Bottle    Place 1 drop Into the left eye every morning    Steroid induced glaucoma, both eyes, Glaucoma associated with ocular trauma, left, stage unspecified       * Notice:  This list has 2 medication(s) that are the same as other medications prescribed for you. Read the directions carefully, and ask your doctor or other care provider to review them with you.

## 2018-11-27 ENCOUNTER — SURGERY (OUTPATIENT)
Age: 33
End: 2018-11-27

## 2018-11-27 ENCOUNTER — HOSPITAL ENCOUNTER (OUTPATIENT)
Facility: AMBULATORY SURGERY CENTER | Age: 33
End: 2018-11-27
Attending: OPHTHALMOLOGY
Payer: COMMERCIAL

## 2018-11-27 ENCOUNTER — ANESTHESIA (OUTPATIENT)
Dept: SURGERY | Facility: AMBULATORY SURGERY CENTER | Age: 33
End: 2018-11-27

## 2018-11-27 VITALS
HEART RATE: 83 BPM | SYSTOLIC BLOOD PRESSURE: 141 MMHG | HEIGHT: 66 IN | WEIGHT: 200 LBS | RESPIRATION RATE: 16 BRPM | DIASTOLIC BLOOD PRESSURE: 86 MMHG | TEMPERATURE: 98.3 F | OXYGEN SATURATION: 98 % | BODY MASS INDEX: 32.14 KG/M2

## 2018-11-27 DIAGNOSIS — H18.20 CORNEAL EDEMA OF LEFT EYE: Primary | ICD-10-CM

## 2018-11-27 DEVICE — EYE CORNEA PROCESS FEE FOR MN LIONS BANK: Type: IMPLANTABLE DEVICE | Site: EYE | Status: FUNCTIONAL

## 2018-11-27 RX ORDER — FENTANYL CITRATE 50 UG/ML
25-50 INJECTION, SOLUTION INTRAMUSCULAR; INTRAVENOUS
Status: DISCONTINUED | OUTPATIENT
Start: 2018-11-27 | End: 2018-11-27 | Stop reason: HOSPADM

## 2018-11-27 RX ORDER — ONDANSETRON 2 MG/ML
4 INJECTION INTRAMUSCULAR; INTRAVENOUS EVERY 30 MIN PRN
Status: DISCONTINUED | OUTPATIENT
Start: 2018-11-27 | End: 2018-11-28 | Stop reason: HOSPADM

## 2018-11-27 RX ORDER — HYDROMORPHONE HYDROCHLORIDE 1 MG/ML
.3-.5 INJECTION, SOLUTION INTRAMUSCULAR; INTRAVENOUS; SUBCUTANEOUS EVERY 10 MIN PRN
Status: DISCONTINUED | OUTPATIENT
Start: 2018-11-27 | End: 2018-11-28 | Stop reason: HOSPADM

## 2018-11-27 RX ORDER — LIDOCAINE 40 MG/G
CREAM TOPICAL
Status: DISCONTINUED | OUTPATIENT
Start: 2018-11-27 | End: 2018-11-27 | Stop reason: HOSPADM

## 2018-11-27 RX ORDER — SODIUM CHLORIDE, SODIUM LACTATE, POTASSIUM CHLORIDE, CALCIUM CHLORIDE 600; 310; 30; 20 MG/100ML; MG/100ML; MG/100ML; MG/100ML
INJECTION, SOLUTION INTRAVENOUS CONTINUOUS
Status: DISCONTINUED | OUTPATIENT
Start: 2018-11-27 | End: 2018-11-28 | Stop reason: HOSPADM

## 2018-11-27 RX ORDER — HYDRALAZINE HYDROCHLORIDE 20 MG/ML
2.5-5 INJECTION INTRAMUSCULAR; INTRAVENOUS EVERY 10 MIN PRN
Status: DISCONTINUED | OUTPATIENT
Start: 2018-11-27 | End: 2018-11-27 | Stop reason: HOSPADM

## 2018-11-27 RX ORDER — LABETALOL HYDROCHLORIDE 5 MG/ML
10 INJECTION, SOLUTION INTRAVENOUS
Status: DISCONTINUED | OUTPATIENT
Start: 2018-11-27 | End: 2018-11-27 | Stop reason: HOSPADM

## 2018-11-27 RX ORDER — ONDANSETRON 2 MG/ML
INJECTION INTRAMUSCULAR; INTRAVENOUS PRN
Status: DISCONTINUED | OUTPATIENT
Start: 2018-11-27 | End: 2018-11-27

## 2018-11-27 RX ORDER — BALANCED SALT SOLUTION 6.4; .75; .48; .3; 3.9; 1.7 MG/ML; MG/ML; MG/ML; MG/ML; MG/ML; MG/ML
SOLUTION OPHTHALMIC PRN
Status: DISCONTINUED | OUTPATIENT
Start: 2018-11-27 | End: 2018-11-27 | Stop reason: HOSPADM

## 2018-11-27 RX ORDER — NALOXONE HYDROCHLORIDE 0.4 MG/ML
.1-.4 INJECTION, SOLUTION INTRAMUSCULAR; INTRAVENOUS; SUBCUTANEOUS
Status: DISCONTINUED | OUTPATIENT
Start: 2018-11-27 | End: 2018-11-28 | Stop reason: HOSPADM

## 2018-11-27 RX ORDER — LIDOCAINE HYDROCHLORIDE AND EPINEPHRINE 10; 10 MG/ML; UG/ML
INJECTION, SOLUTION INFILTRATION; PERINEURAL PRN
Status: DISCONTINUED | OUTPATIENT
Start: 2018-11-27 | End: 2018-11-27 | Stop reason: HOSPADM

## 2018-11-27 RX ORDER — CELECOXIB 200 MG/1
200 CAPSULE ORAL ONCE
Status: COMPLETED | OUTPATIENT
Start: 2018-11-27 | End: 2018-11-27

## 2018-11-27 RX ORDER — GABAPENTIN 300 MG/1
300 CAPSULE ORAL ONCE
Status: DISCONTINUED | OUTPATIENT
Start: 2018-11-27 | End: 2018-11-27 | Stop reason: HOSPADM

## 2018-11-27 RX ORDER — ACETAMINOPHEN 325 MG/1
975 TABLET ORAL ONCE
Status: DISCONTINUED | OUTPATIENT
Start: 2018-11-27 | End: 2018-11-27 | Stop reason: HOSPADM

## 2018-11-27 RX ORDER — MEPERIDINE HYDROCHLORIDE 25 MG/ML
12.5 INJECTION INTRAMUSCULAR; INTRAVENOUS; SUBCUTANEOUS
Status: DISCONTINUED | OUTPATIENT
Start: 2018-11-27 | End: 2018-11-28 | Stop reason: HOSPADM

## 2018-11-27 RX ORDER — FENTANYL CITRATE 50 UG/ML
25-50 INJECTION, SOLUTION INTRAMUSCULAR; INTRAVENOUS
Status: DISCONTINUED | OUTPATIENT
Start: 2018-11-27 | End: 2018-11-28 | Stop reason: HOSPADM

## 2018-11-27 RX ORDER — PREDNISOLONE ACETATE 1 %
SUSPENSION, DROPS(FINAL DOSAGE FORM)(ML) OPHTHALMIC (EYE) PRN
Status: DISCONTINUED | OUTPATIENT
Start: 2018-11-27 | End: 2018-11-27 | Stop reason: HOSPADM

## 2018-11-27 RX ORDER — OXYCODONE HYDROCHLORIDE 5 MG/1
5 TABLET ORAL EVERY 4 HOURS PRN
Status: DISCONTINUED | OUTPATIENT
Start: 2018-11-27 | End: 2018-11-28 | Stop reason: HOSPADM

## 2018-11-27 RX ORDER — ONDANSETRON 4 MG/1
4 TABLET, ORALLY DISINTEGRATING ORAL EVERY 30 MIN PRN
Status: DISCONTINUED | OUTPATIENT
Start: 2018-11-27 | End: 2018-11-28 | Stop reason: HOSPADM

## 2018-11-27 RX ORDER — DEXAMETHASONE SODIUM PHOSPHATE 4 MG/ML
INJECTION, SOLUTION INTRA-ARTICULAR; INTRALESIONAL; INTRAMUSCULAR; INTRAVENOUS; SOFT TISSUE PRN
Status: DISCONTINUED | OUTPATIENT
Start: 2018-11-27 | End: 2018-11-27 | Stop reason: HOSPADM

## 2018-11-27 RX ORDER — ACETAMINOPHEN 325 MG/1
975 TABLET ORAL ONCE
Status: COMPLETED | OUTPATIENT
Start: 2018-11-27 | End: 2018-11-27

## 2018-11-27 RX ORDER — SODIUM CHLORIDE, SODIUM LACTATE, POTASSIUM CHLORIDE, CALCIUM CHLORIDE 600; 310; 30; 20 MG/100ML; MG/100ML; MG/100ML; MG/100ML
INJECTION, SOLUTION INTRAVENOUS CONTINUOUS PRN
Status: DISCONTINUED | OUTPATIENT
Start: 2018-11-27 | End: 2018-11-27

## 2018-11-27 RX ORDER — LIDOCAINE HYDROCHLORIDE 20 MG/ML
INJECTION, SOLUTION INFILTRATION; PERINEURAL PRN
Status: DISCONTINUED | OUTPATIENT
Start: 2018-11-27 | End: 2018-11-27

## 2018-11-27 RX ORDER — OXYCODONE HCL 5 MG/5 ML
5 SOLUTION, ORAL ORAL EVERY 4 HOURS PRN
Status: DISCONTINUED | OUTPATIENT
Start: 2018-11-27 | End: 2018-11-28 | Stop reason: HOSPADM

## 2018-11-27 RX ORDER — GABAPENTIN 300 MG/1
300 CAPSULE ORAL ONCE
Status: COMPLETED | OUTPATIENT
Start: 2018-11-27 | End: 2018-11-27

## 2018-11-27 RX ORDER — PROPOFOL 10 MG/ML
INJECTION, EMULSION INTRAVENOUS PRN
Status: DISCONTINUED | OUTPATIENT
Start: 2018-11-27 | End: 2018-11-27

## 2018-11-27 RX ADMIN — LIDOCAINE HYDROCHLORIDE 40 MG: 20 INJECTION, SOLUTION INFILTRATION; PERINEURAL at 11:52

## 2018-11-27 RX ADMIN — CELECOXIB 200 MG: 200 CAPSULE ORAL at 09:33

## 2018-11-27 RX ADMIN — DEXAMETHASONE SODIUM PHOSPHATE 0.5 ML: 4 INJECTION, SOLUTION INTRA-ARTICULAR; INTRALESIONAL; INTRAMUSCULAR; INTRAVENOUS; SOFT TISSUE at 14:18

## 2018-11-27 RX ADMIN — ONDANSETRON 4 MG: 2 INJECTION INTRAMUSCULAR; INTRAVENOUS at 12:30

## 2018-11-27 RX ADMIN — BALANCED SALT SOLUTION 15 ML: 6.4; .75; .48; .3; 3.9; 1.7 SOLUTION OPHTHALMIC at 12:02

## 2018-11-27 RX ADMIN — ACETAMINOPHEN 975 MG: 325 TABLET ORAL at 09:32

## 2018-11-27 RX ADMIN — BALANCED SALT SOLUTION 15 ML: 6.4; .75; .48; .3; 3.9; 1.7 SOLUTION OPHTHALMIC at 12:24

## 2018-11-27 RX ADMIN — PROPOFOL 30 MG: 10 INJECTION, EMULSION INTRAVENOUS at 11:53

## 2018-11-27 RX ADMIN — Medication 0.5 ML: at 14:18

## 2018-11-27 RX ADMIN — SODIUM CHLORIDE, SODIUM LACTATE, POTASSIUM CHLORIDE, CALCIUM CHLORIDE: 600; 310; 30; 20 INJECTION, SOLUTION INTRAVENOUS at 11:00

## 2018-11-27 RX ADMIN — Medication 1 DROP: at 12:03

## 2018-11-27 RX ADMIN — BALANCED SALT SOLUTION 500 ML: 6.4; .75; .48; .3; 3.9; 1.7 SOLUTION OPHTHALMIC at 12:25

## 2018-11-27 RX ADMIN — LIDOCAINE HYDROCHLORIDE AND EPINEPHRINE 0.5 ML: 10; 10 INJECTION, SOLUTION INFILTRATION; PERINEURAL at 12:14

## 2018-11-27 RX ADMIN — GABAPENTIN 300 MG: 300 CAPSULE ORAL at 09:32

## 2018-11-27 RX ADMIN — PROPOFOL 20 MG: 10 INJECTION, EMULSION INTRAVENOUS at 11:54

## 2018-11-27 RX ADMIN — Medication 1 DROP: at 14:19

## 2018-11-27 RX ADMIN — PROPOFOL 30 MG: 10 INJECTION, EMULSION INTRAVENOUS at 11:55

## 2018-11-27 NOTE — IP AVS SNAPSHOT
MRN:[id number]                      After Visit Summary   11/27/2018    Enrique Gabriel    MRN: [id number]           Thank you!     Thank you for choosing [facility] for your care. Our goal is always to provide you with excellent care. Hearing back from our patients is one way we can continue to improve our services. Please take a few minutes to complete the written survey that you may receive in the mail after you visit with us. Thank you!        Patient Information     Date Of Birth          1985        About your hospital stay     You were admitted on:  November 27, 2018 [facility]    You were discharged on:  November 27, 2018       Who to Call     For medical emergencies, please call 911.  For non-urgent questions about your medical care, please call your primary care provider or clinic, [phone]  For questions related to your surgery, please call your surgery clinic        Attending Provider     Provider Specialty    Jose G Hou MD Ophthalmology       Primary Care Provider Office Phone # Fax #    Torsten W Zoran [phone]      After Care Instructions     Activity       Avoid strenuous activities the next several days.                  Your next 10 appointments already scheduled     Nov 28, 2018  1:15 PM CST   Post-Op with Jose G Hou MD   Eye Clinic ([facility])    [address] [address] [address] [phone]            Dec 03, 2018  8:15 AM CST   Post-Op with Jose G Hou MD   Eye Clinic ([facility])    [address] [address] [address]   [phone]            Jan 07, 2019  8:15 AM CST   Post-Op with Jose G Hou MD   Eye Clinic ([facility])    [address] [address] [address]    [phone]            Apr 01, 2019 10:15 AM CDT   RETURN GLAUCOMA with Yue Starr MD   Eye Clinic ([facility])    [place] [address]  9th Fl Clin 9a  [facility] [address]   [phone]              Further instructions from your care team       Dr. Jose G Hou  Discharge Instructions following [facility]  [facility]    General Reminders  a) Bring your drops to your post op appointment.  b) Continue any medication from your general medical doctor unless instructed otherwise.  c) Call immediately if you have any problems or questions.  Symptoms that often occur after surgery  a) Scratchiness  b) Foreign body sensation   ALERT  symptoms - call immediately should these occur ([phone])  a) Deep aching pain with headache and/or nausea  Post-operative care  a) Stay flat on back as much as possible the first 24 hours.   You may get up for brief periods, such as to eat and go to the bathroom.  b) You do not have to start taking eye drops today. Keep the patch on the eye until your follow-up appointment tomorrow.  The first couple of weeks following surgery  a) You will wear a shield at bedtime for one week.  b) Minimize eye rubbing or heavy lifting for the first week following surgery.  c) No swimming, hot tubs, or whirlpools for 4 weeks.  Showers are ok after your follow up visit the day after surgery.  Additional Postoperative Instructions  a) Some discomfort in the operative eye the day of surgery is normal. An over the counter pain reliever such as Ibuprofen or Tylenol may be used. Follow the instructions as directed on the bottle.  b) Prolonged nausea, vomiting, loss of vision, discharge (other than tearing), or severe pain should be reported to your doctor. Please call [phone] and wait for the prompts to reach the on-call doctor.    [facility]  Home Care Following Anesthesia  For 24 hours after  surgery:  1. Get plenty of rest.  A responsible adult must stay with you for at least 24 hours after you leave the surgery center.  2. Do not drive or use heavy equipment.  If you have weakness or tingling, don't drive or use heavy equipment until this feeling goes away.   3. Do not drink alcohol.   4. Avoid strenuous or risky activities.  Ask for help when climbing stairs.  5. You may feel lightheaded.  IF so, sit for a few minutes before standing.  Have someone help you get up.   6. If you have nausea (feel sick to your stomach): Drink only clear liquids such as apple juice, ginger ale, broth or 7-Up.  Rest may also help.  Be sure to drink enough fluids.  Move to a regular diet as you feel able.   7. You may have a slight fever.  Call the doctor if your fever is over 100 F (37.7 C) (taken under the tongue) or lasts longer than 24 hours.  8. You may have a dry mouth, a sore throat, muscle aches or trouble sleeping. These should go away after 24 hours.  9. Do not make important or legal decisions.       Tips for taking pain medications  To get the best pain relief possible, remember these points:    Take pain medications as directed, before pain becomes severe.    Pain medication can upset your stomach: taking it with food may help.    Constipation is a common side effect of pain medication. Drink plenty of  fluids.    Eat foods high in fiber. Take a stool softener if recommended by your doctor or pharmacist.    Do not drink alcohol, drive or operate machinery while taking pain medications.    Ask about other ways to control pain, such as with heat, ice or relaxation.    Tylenol/Acetaminophen Consumption  To help encourage the safe use of acetaminophen, the makers of TYLENOL  have lowered the maximum daily dose for single-ingredient Extra Strength TYLENOL  (acetaminophen) products sold in the [place] from 8 pills per day (4,000 mg) to 6 pills per day (3,000 mg). The dosing interval has also changed from 2 pills every  "4-6 hours to 2 pills every 6 hours.    If you feel your pain relief is insufficient, you may take Tylenol/Acetaminophen in addition to your narcotic pain medication.     Be careful not to exceed 3,000 mg of Tylenol/Acetaminophen in a 24 hour period from all sources.    If you are taking extra strength Tylenol/acetaminophen (500 mg), the maximum dose is 6 tablets in 24 hours.    If you are taking regular strength acetaminophen (325 mg), the maximum dose is 9 tablets in 24 hours.    Call a doctor for any of the followin. Signs of infection (fever, growing tenderness at the surgery site, a large amount of drainage or bleeding, severe pain, foul-smelling drainage, redness, swelling).  2. It has been over 8 to 10 hours since surgery and you are still not able to urinate (pass water).  3. Headache for over 24 hours.  Your doctor is:  Dr. Jose G Hou, Ophthalmology: [phone]                  Or dial [phone] and ask for the resident on call for:  Ophthalmology  For emergency care, call the:  [facility]:  [phone] (TTY for hearing impaired: [phone])                  Pending Results     No orders found from 2018 to 2018.            Admission Information     Date & Time Provider Department Dept. Phone    2018 Jose G Hou MD [facility] [phone]      Your Vitals Were     Blood Pressure Pulse Temperature Respirations Height Weight    131/92 83 98.4  F (36.9  C) (Temporal) 16 1.676 m (5' 6\") 90.7 kg (200 lb)    Pulse Oximetry BMI (Body Mass Index)                96% 32.28 kg/m2          [employer] Information     [employer] gives you secure access to your electronic health record. If you see a primary care provider, you can also send messages to your care team and make appointments. If you have questions, please call your primary care clinic.  If you do not have a primary care provider, please call [phone] and they will assist you.   "    [employer] is an electronic gateway that provides easy, online access to your medical records. With [employer], you can request a clinic appointment, read your test results, renew a prescription or communicate with your care team.     To access your existing account, please contact your [facility] or call [phone] for assistance.        Care EveryWhere ID     This is your Care EveryWhere ID. This could be used by other organizations to access your [facility] medical records  [id number]        Equal Access to Services     CHRISSY GERONIMO : Hadii aad ku hadasho Soomaali, waaxda luqadaha, qaybta kaalmada adeegyada, waxay idiin hayaan ignacia danielsarahimanuel laanastacio dickens. So [facility] [phone].    ATENCIÓN: Si habla español, tiene a shepherd disposición servicios gratuitos de asistencia lingüística. [facility] [phone].    We comply with applicable federal civil rights laws and [place] laws. We do not discriminate on the basis of race, color, national origin, age, disability, sex, sexual orientation, or gender identity.               Review of your medicines      UNREVIEWED medicines. Ask your doctor about these medicines        Dose / Directions    brimonidine 0.2 % ophthalmic solution   Commonly known as:  ALPHAGAN        Dose:  1 drop   Apply 1 drop to eye 2 times daily   Refills:  0       dorzolamide-timolol 2-0.5 % ophthalmic solution   Commonly known as:  COSOPT   Used for:  Steroid induced glaucoma, left eye        Dose:  1 drop   Place 1 drop Into the left eye 2 times daily   Quantity:  1 Bottle   Refills:  11       hypromellose 0.3 % Gel ophthalmic gel   Commonly known as:  GENTEAL   Used for:  Ruptured globe of left eye, initial encounter        Dose:  0.5 inch   Place 0.5 g (0.5 inches) Into the left eye At Bedtime Apply approximately a half inch ribbon of ointment to the inside of your lower lids. Warning, application of the ointment to your eyes will cause temporary blurring of your vision from  the ointment coating your eye. Please apply right before bedtime.   Quantity:  10 g   Refills:  11       latanoprost 0.005 % ophthalmic solution   Commonly known as:  XALATAN   Used for:  Glaucoma associated with ocular trauma, left, stage unspecified        Dose:  1 drop   Place 1 drop Into the left eye At Bedtime   Quantity:  1 Bottle   Refills:  11       LISINOPRIL PO        Dose:  20 mg   Take 20 mg by mouth daily   Refills:  0       ofloxacin 0.3 % ophthalmic solution   Commonly known as:  OCUFLOX   Used for:  Corneal edema of left eye        Dose:  1 drop   Place 1 drop Into the left eye 4 times daily   Quantity:  1 Bottle   Refills:  0       * prednisoLONE acetate 1 % ophthalmic suspension   Commonly known as:  PRED FORTE   Used for:  History of cornea transplant, Rejection of cornea transplant        Dose:  1 drop   Place 1 drop Into the left eye daily   Quantity:  1 Bottle   Refills:  3       * prednisoLONE acetate 1 % ophthalmic suspension   Commonly known as:  PRED FORTE   Used for:  Corneal edema of left eye        Dose:  1 drop   Place 1 drop Into the left eye 4 times daily   Quantity:  5 mL   Refills:  0       timolol maleate 0.5 % ophthalmic solution   Commonly known as:  TIMOPTIC   Used for:  Steroid induced glaucoma, both eyes, Glaucoma associated with ocular trauma, left, stage unspecified        Dose:  1 drop   Place 1 drop Into the left eye every morning   Quantity:  1 Bottle   Refills:  11       * Notice:  This list has 2 medication(s) that are the same as other medications prescribed for you. Read the directions carefully, and ask your doctor or other care provider to review them with you.             Protect others around you: Learn how to safely use, store and throw away your medicines at www.disposemymeds.org.             Medication List: This is a list of all your medications and when to take them. Check marks below indicate your daily home schedule. Keep this list as a reference.       Medications           Morning Afternoon Evening Bedtime As Needed    brimonidine 0.2 % ophthalmic solution   Commonly known as:  ALPHAGAN   Apply 1 drop to eye 2 times daily                                dorzolamide-timolol 2-0.5 % ophthalmic solution   Commonly known as:  COSOPT   Place 1 drop Into the left eye 2 times daily                                hypromellose 0.3 % Gel ophthalmic gel   Commonly known as:  GENTEAL   Place 0.5 g (0.5 inches) Into the left eye At Bedtime Apply approximately a half inch ribbon of ointment to the inside of your lower lids. Warning, application of the ointment to your eyes will cause temporary blurring of your vision from the ointment coating your eye. Please apply right before bedtime.                                latanoprost 0.005 % ophthalmic solution   Commonly known as:  XALATAN   Place 1 drop Into the left eye At Bedtime                                LISINOPRIL PO   Take 20 mg by mouth daily                                ofloxacin 0.3 % ophthalmic solution   Commonly known as:  OCUFLOX   Place 1 drop Into the left eye 4 times daily   Last time this was given:  1 drop on 11/27/2018  2:20 PM                                * prednisoLONE acetate 1 % ophthalmic suspension   Commonly known as:  PRED FORTE   Place 1 drop Into the left eye daily   Last time this was given:  1 drop on 11/27/2018  2:19 PM                                * prednisoLONE acetate 1 % ophthalmic suspension   Commonly known as:  PRED FORTE   Place 1 drop Into the left eye 4 times daily   Last time this was given:  1 drop on 11/27/2018  2:19 PM                                timolol maleate 0.5 % ophthalmic solution   Commonly known as:  TIMOPTIC   Place 1 drop Into the left eye every morning                                * Notice:  This list has 2 medication(s) that are the same as other medications prescribed for you. Read the directions carefully, and ask your doctor or other care provider to  review them with you.

## 2018-11-27 NOTE — IP AVS SNAPSHOT
[facility]    [address] [place] [address]    Phone:  [phone]    Fax:  [phone]                                       After Visit Summary   11/27/2018    Enrique Gabriel    MRN: [id number]           After Visit Summary Signature Page     I have received my discharge instructions, and my questions have been answered. I have discussed any challenges I see with this plan with the nurse or doctor.    ..........................................................................................................................................  Patient/Patient Representative Signature      ..........................................................................................................................................  Patient Representative Print Name and Relationship to Patient    ..................................................               ................................................  Date                                   Time    ..........................................................................................................................................  Reviewed by Signature/Title    ...................................................              ..............................................  Date                                               Time          22EPIC Rev 08/18

## 2018-11-27 NOTE — DISCHARGE INSTRUCTIONS
Dr. Jose G Hou  Discharge Instructions following DSEK Surgery  [facility]    General Reminders  a) Bring your drops to your post op appointment.  b) Continue any medication from your general medical doctor unless instructed otherwise.  c) Call immediately if you have any problems or questions.  Symptoms that often occur after surgery  a) Scratchiness  b) Foreign body sensation   ALERT  symptoms - call immediately should these occur ([phone])  a) Deep aching pain with headache and/or nausea  Post-operative care  a) Stay flat on back as much as possible the first 24 hours.   You may get up for brief periods, such as to eat and go to the bathroom.  b) You do not have to start taking eye drops today. Keep the patch on the eye until your follow-up appointment tomorrow.  The first couple of weeks following surgery  a) You will wear a shield at bedtime for one week.  b) Minimize eye rubbing or heavy lifting for the first week following surgery.  c) No swimming, hot tubs, or whirlpools for 4 weeks.  Showers are ok after your follow up visit the day after surgery.  Additional Postoperative Instructions  a) Some discomfort in the operative eye the day of surgery is normal. An over the counter pain reliever such as Ibuprofen or Tylenol may be used. Follow the instructions as directed on the bottle.  b) Prolonged nausea, vomiting, loss of vision, discharge (other than tearing), or severe pain should be reported to your doctor. Please call [phone] and wait for the prompts to reach the on-call doctor.    [facility]  Home Care Following Anesthesia  For 24 hours after surgery:  1. Get plenty of rest.  A responsible adult must stay with you for at least 24 hours after you leave the surgery center.  2. Do not drive or use heavy equipment.  If you have weakness or tingling, don't drive or use heavy equipment until this feeling goes away.   3. Do not drink alcohol.   4. Avoid  strenuous or risky activities.  Ask for help when climbing stairs.  5. You may feel lightheaded.  IF so, sit for a few minutes before standing.  Have someone help you get up.   6. If you have nausea (feel sick to your stomach): Drink only clear liquids such as apple juice, ginger ale, broth or 7-Up.  Rest may also help.  Be sure to drink enough fluids.  Move to a regular diet as you feel able.   7. You may have a slight fever.  Call the doctor if your fever is over 100 F (37.7 C) (taken under the tongue) or lasts longer than 24 hours.  8. You may have a dry mouth, a sore throat, muscle aches or trouble sleeping. These should go away after 24 hours.  9. Do not make important or legal decisions.       Tips for taking pain medications  To get the best pain relief possible, remember these points:    Take pain medications as directed, before pain becomes severe.    Pain medication can upset your stomach: taking it with food may help.    Constipation is a common side effect of pain medication. Drink plenty of  fluids.    Eat foods high in fiber. Take a stool softener if recommended by your doctor or pharmacist.    Do not drink alcohol, drive or operate machinery while taking pain medications.    Ask about other ways to control pain, such as with heat, ice or relaxation.    Tylenol/Acetaminophen Consumption  To help encourage the safe use of acetaminophen, the makers of TYLENOL  have lowered the maximum daily dose for single-ingredient Extra Strength TYLENOL  (acetaminophen) products sold in the [place] from 8 pills per day (4,000 mg) to 6 pills per day (3,000 mg). The dosing interval has also changed from 2 pills every 4-6 hours to 2 pills every 6 hours.    If you feel your pain relief is insufficient, you may take Tylenol/Acetaminophen in addition to your narcotic pain medication.     Be careful not to exceed 3,000 mg of Tylenol/Acetaminophen in a 24 hour period from all sources.    If you are taking extra strength  Tylenol/acetaminophen (500 mg), the maximum dose is 6 tablets in 24 hours.    If you are taking regular strength acetaminophen (325 mg), the maximum dose is 9 tablets in 24 hours.    Call a doctor for any of the followin. Signs of infection (fever, growing tenderness at the surgery site, a large amount of drainage or bleeding, severe pain, foul-smelling drainage, redness, swelling).  2. It has been over 8 to 10 hours since surgery and you are still not able to urinate (pass water).  3. Headache for over 24 hours.  Your doctor is:  Dr. Jose G Hou, Ophthalmology: [phone]                  Or dial [phone] and ask for the resident on call for:  Ophthalmology  For emergency care, call the:  [place]:  [phone] (TTY for hearing impaired: [phone])

## 2018-11-27 NOTE — ANESTHESIA POSTPROCEDURE EVALUATION
Anesthesia POST Procedure Evaluation    Patient: Enrique Gabriel   MRN:     [id number] Gender:   male   Age:    33 year old :      1985        Preoperative Diagnosis: Failed Corneal Transplant, Left Eye   Procedure(s):  Left Eye Descemet Stripping Automated Endothelial Keratoplasty  Anterior Vitrectomy  REPOSITION INTRAOCULAR LENS   Postop Comments: No value filed.       Anesthesia Type:  MAC    Reportable Event: NO     PAIN: Uncomplicated   Sign Out status: Comfortable, Well controlled pain     PONV: No PONV   Sign Out status:  No Nausea or Vomiting     Neuro/Psych: Uneventful perioperative course   Sign Out Status: Preoperative baseline; Age appropriate mentation     Airway/Resp.: Uneventful perioperative course   Sign Out Status: Non labored breathing, age appropriate RR; Resp. Status within EXPECTED Parameters     CV: Uneventful perioperative course   Sign Out status: Appropriate BP and perfusion indices; Appropriate HR/Rhythm     Disposition:   Sign Out in:  PACU  Disposition:  Phase II; Home  Recovery Course: Uneventful  Follow-Up: Not required           Last Anesthesia Record Vitals:  CRNA VITALS  2018 1346 - 2018 1429      2018             Pulse: 67    Ht Rate: 63    SpO2: 99 %    Resp Rate (set): 10    EKG: Sinus rhythm          Last PACU/Preop Vitals:  Vitals:    18 0919 18 1421   BP: (!) 152/96 (!) 131/92   Pulse: 83    Resp: 17 16   Temp: 36.8  C (98.2  F) 36.9  C (98.4  F)   SpO2: 98% 96%         Electronically Signed By: Sanchez Her MD, 2018, 2:29 PM

## 2018-11-28 ENCOUNTER — OFFICE VISIT (OUTPATIENT)
Dept: OPHTHALMOLOGY | Facility: CLINIC | Age: 33
End: 2018-11-28
Attending: OPHTHALMOLOGY
Payer: COMMERCIAL

## 2018-11-28 DIAGNOSIS — H40.32X0: ICD-10-CM

## 2018-11-28 DIAGNOSIS — H18.20 CORNEAL EDEMA OF LEFT EYE: ICD-10-CM

## 2018-11-28 DIAGNOSIS — Z94.7 HISTORY OF CORNEA TRANSPLANT: Primary | ICD-10-CM

## 2018-11-28 PROCEDURE — G0463 HOSPITAL OUTPT CLINIC VISIT: HCPCS | Mod: ZF

## 2018-11-28 ASSESSMENT — REFRACTION_WEARINGRX
OD_CYLINDER: +3.25
OD_SPHERE: -6.50
OS_AXIS: 123
OD_AXIS: 125
OS_SPHERE: -0.25
OS_CYLINDER: +2.25

## 2018-11-28 ASSESSMENT — TONOMETRY
OS_IOP_MMHG: 24
IOP_METHOD: ICARE
OD_IOP_MMHG: 16

## 2018-11-28 ASSESSMENT — EXTERNAL EXAM - LEFT EYE: OS_EXAM: NORMAL

## 2018-11-28 ASSESSMENT — VISUAL ACUITY
OS_SC: 20/150
OD_CC+: -2
METHOD: SNELLEN - LINEAR
OD_CC: 20/25

## 2018-11-28 ASSESSMENT — EXTERNAL EXAM - RIGHT EYE: OD_EXAM: NORMAL

## 2018-11-28 ASSESSMENT — SLIT LAMP EXAM - LIDS
COMMENTS: NORMAL
COMMENTS: REACTIVE PTOSIS

## 2018-11-28 NOTE — PROGRESS NOTES
CC: POD#1 s/p DSAEK, ant vit, refixated scleral sutured IOL left eye    HPI: Enrique Gabriel is a 33 year old male s/p globe repair, left eye. 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 Presented with dehiscence of cornea at limbus from 9 o'clock to 3'oclock with extremely hypotonous eye, vitreous in the anterior chamber, dislocated IOL, and vitreous hemorrhage. questionable Retinal detachment. Underwent resuturing of cornea to limbus and limited peritomy without evidence of scleral laceration.    Interval history:  Vision is the same as last visit.  Using drops as instructed.  No new flashes, floater, diplopia. No pain, redness, tearing.     POHx;  PKP for keratoconus OU 2002  Trauma to OS with ruptured glob 2003  Repeat PKP OS 2004  Previous cataract surgery done at [facility] in [place].   Secondary IOL left eye in 2003.   H/o graft rejection left eye treated in 2011.  Ahmed tube OS 10/3/17    Gtts:  Combigan BID left eye  LD yesterday morning  Cosopt BID left eye  LD yesterday morning   Latanoprost at bedtime left eye LD 2 days ago  Prednisolone twice a day left eye   Prednisolone every other day right eye     Assessment/Plan:    1.  s/p repair of globe rupture/corneal dehiscence left eye (1/14/17)    2. S/p DSAEK, ant vit, refixated scleral Gortex sutured IOL left eye   -graft clear centrally and inferiorly; fibrous ingrowth superotemporally    -start pred and oflox qid   -scleral sutured lens well fixated although eccentric   -small amount of fluid inferiorly in graft interface, will keep prolene suture in place   -monitor for now, if still detached at POW#1, would rebubble     2. AC vitreous prolapse OS   - s/p ant vitrectomy 11/27/18    3. Traumatic Aniridia OS    4. Glaucoma, OS   s/p left Ahmed valve   Continue intraocular pressure gtts as above, patient scheduled to see Dr. Matty banks  may.     Steroid responder   Continue to follow-up with Dr. Starr   IOP slightly up today, monitor in postoperative period, consider resuming only 1 drop    Ioana Grace MD  PGY-5, Cornea Fellow    Attending Physician Attestation:  Complete documentation of historical and exam elements from today's encounter can be found in the full encounter summary report (not reduplicated in this progress note).  I personally obtained the chief complaint(s) and history of present illness.  I confirmed and edited as necessary the review of systems, past medical/surgical history, family history, social history, and examination findings as documented by others; and I examined the patient myself.  I personally reviewed the relevant tests, images, and reports as documented above.  I formulated and edited as necessary the assessment and plan and discussed the findings and management plan with the patient and family. - Jose G Hou MD

## 2018-11-28 NOTE — NURSING NOTE
Chief Complaints and History of Present Illnesses   Patient presents with     Post Op (Ophthalmology) Left Eye     S/P DSEAK with Anterior Vit 1 day      HPI    Last Eye Exam:  11/26/18   Affected eye(s):  Left   Symptoms:        Duration:  1 day   Frequency:  Constant       Do you have eye pain now?:  No      Comments:  Pt slept well last night. Eye is feeling comfortable today. MD please review post operative instructions with pt today. GLADYS CHRISTINE, COA 1:30 PM 11/28/2018

## 2018-11-28 NOTE — MR AVS SNAPSHOT
After Visit Summary   11/28/2018    Enrique Gabriel    MRN: [id number]           Patient Information     Date Of Birth          1985        Visit Information        Provider Department      11/28/2018 1:15 PM Jose G Hou MD [facility]        Today's Diagnoses     History of cornea transplant - Both Eyes    -  1    Corneal edema of left eye        Glaucoma associated with ocular trauma, left, stage unspecified           Follow-ups after your visit        Follow-up notes from your care team     Return for Vision, Pressure.      Your next 10 appointments already scheduled     Dec 03, 2018  8:15 AM CST   Post-Op with Jose G Hou MD   Eye Clinic ([facility])    [address]  [address] [address]  [facility] [id number] [phone]            Jan 07, 2019  8:15 AM CST   Post-Op with Jose G Hou MD   Eye Clinic ([facility])    [address]  [address] [address]  [facility] [id number] [phone]            Apr 01, 2019 10:15 AM CDT   RETURN GLAUCOMA with Yue Starr MD   Eye Clinic ([facility])    [address]  [facility] [id number] [phone]              Who to contact     Please call your clinic at [phone] to:    Ask questions about your health    Make or cancel appointments    Discuss your medicines    Learn about your test results    Speak to your doctor            Additional Information About Your Visit        [employer] Information     [employer] gives you secure access to your electronic health record. If you see a primary care provider, you can also send messages to your care team and make appointments. If you have questions, please call your primary care clinic.  If you do not have a primary care provider, please call [phone] and they will assist you.      [employer] is an electronic gateway that provides easy,  online access to your medical records. With [employer], you can request a clinic appointment, read your test results, renew a prescription or communicate with your care team.     To access your existing account, please contact your [facility] or call [phone] for assistance.        Care EveryWhere ID     This is your Care EveryWhere ID. This could be used by other organizations to access your [facility] medical records  [id number]         Blood Pressure from Last 3 Encounters:   11/27/18 141/86   01/14/17 137/73    Weight from Last 3 Encounters:   11/27/18 90.7 kg (200 lb)   01/13/17 81.6 kg (180 lb)              Today, you had the following     No orders found for display       Primary Care Provider Office Phone # Fax #    Torsten HOUSER Zoran [phone]       [address] [address]        Equal Access to Services     [facility] : Hadii aad ku hadasho Soomaali, waaxda luqadaha, qaybta kaalmada adeegyada, waxay gaganin hayaan ignacia kennedy . So [facility] [phone].    ATENCIÓN: Si habla español, tiene a shepherd disposición servicios gratuitos de asistencia lingüística. Llame al [phone].    We comply with applicable federal civil rights laws and [place] laws. We do not discriminate on the basis of race, color, national origin, age, disability, sex, sexual orientation, or gender identity.            Thank you!     Thank you for choosing [facility]  for your care. Our goal is always to provide you with excellent care. Hearing back from our patients is one way we can continue to improve our services. Please take a few minutes to complete the written survey that you may receive in the mail after your visit with us. Thank you!             Your Updated Medication List - Protect others around you: Learn how to safely use, store and throw away your medicines at www.disposemymeds.org.          This list is accurate as of 11/28/18  11:26 PM.  Always use your most recent med list.                   Brand Name Dispense Instructions for use Diagnosis    brimonidine 0.2 % ophthalmic solution    ALPHAGAN     Apply 1 drop to eye 2 times daily        dorzolamide-timolol 2-0.5 % ophthalmic solution    COSOPT    1 Bottle    Place 1 drop Into the left eye 2 times daily    Steroid induced glaucoma, left eye       hypromellose 0.3 % Gel ophthalmic gel    GENTEAL    10 g    Place 0.5 g (0.5 inches) Into the left eye At Bedtime Apply approximately a half inch ribbon of ointment to the inside of your lower lids. Warning, application of the ointment to your eyes will cause temporary blurring of your vision from the ointment coating your eye. Please apply right before bedtime.    Ruptured globe of left eye, initial encounter       latanoprost 0.005 % ophthalmic solution    XALATAN    1 Bottle    Place 1 drop Into the left eye At Bedtime    Glaucoma associated with ocular trauma, left, stage unspecified       LISINOPRIL PO      Take 20 mg by mouth daily        ofloxacin 0.3 % ophthalmic solution    OCUFLOX    1 Bottle    Place 1 drop Into the left eye 4 times daily    Corneal edema of left eye       * prednisoLONE acetate 1 % ophthalmic suspension    PRED FORTE    1 Bottle    Place 1 drop Into the left eye daily    History of cornea transplant, Rejection of cornea transplant       * prednisoLONE acetate 1 % ophthalmic suspension    PRED FORTE    5 mL    Place 1 drop Into the left eye 4 times daily    Corneal edema of left eye       timolol maleate 0.5 % ophthalmic solution    TIMOPTIC    1 Bottle    Place 1 drop Into the left eye every morning    Steroid induced glaucoma, both eyes, Glaucoma associated with ocular trauma, left, stage unspecified       * Notice:  This list has 2 medication(s) that are the same as other medications prescribed for you. Read the directions carefully, and ask your doctor or other care provider to review them with you.

## 2018-11-29 NOTE — OP NOTE
Operative Report    Date of Operation: 11/27/18  Pre-operative diagnosis:   1. Corneal graft failure, left eye  2. Dislocated scleral sutured intraocular lens, left eye  3. Subepithelial corneal scarring, left eye  Post-operative diagnosis: Same   Procedure(s):   1. Descemet's stripping endothelial keratoplasty, left eye  2. Refixation of scleral sutured intraocular lens, left eye   3. Anterior vitrectomy, left eye  4. Superficial keratectomy, left eye  Surgeon(s):   Attending: Jose G Hou MD  Fellow: Ioana Grace MD  Findings: None   Blood Loss: None  Complications: None     INDICATION FOR PROCEDURE   The patient has a history of open globe repair of left eye, penetrating keratoplasty left eye, and scleral sutured lens left eye now found to have corneal edema affecting activities of daily living.  Descemet's stripping automated endothelial keratoplasty was discussed with the patient. The risks, including, but not limited to infection, loss of vision, loss of eye, need for more surgery, and bleeding, along with the benefits, alternatives, expectations, and the procedure itself were discussed at length with the patient who wished to proceed with surgery.   DESCRIPTION OF PROCEDURE   In the preoperative suite, the patient was identified, the surgical site marked and informed consent was obtained. The patient was the brought back to the operative suite where the appropriate anesthesia monitors were connected. A routine time-out was performed and retrobulbar block consisting of Lidocaine, Marcaine, and Wydase was administered to the left eye. The patient's left eye was then prepped and draped in the usual sterile fashion for ophthalmic surgery.  Following draping, a lid speculum was placed to the operative eye. Upon examination, the patient's previous scleral sutured intraocular lens was noted to be far posterior. The lens appeared to no longer be attached superiorly but was well-fixated inferiorly. Due to the  tenuous state of the lens and concern that it would fall posteriorly, the decision was made to refixate the superior haptic. Using wek cels and 0.12 forceps, a superficial keratectomy was performed to debride the corneal epithelium and subepithelial scarring peripherally - providing a clearer view for the surgery. The area of the previous suture placement was noted superiorly. At this point an anterior vitrectomy was performed. First weckels were placed at the main wound to check for presence of vitreous. Using the anterior vitrector, vitreous was removed from the anterior chamber and around the lens. Vitrectomy was continued until no vitreous was further noted at the wounds. Hugo scissors were used to cut vitreous from the wound as needed to minimize traction on the retina.    2.8mm keratome blade was then to create a two-step temporal corneal incision.With aid of a sinskey hook and MST forceps, the dislocated superior haptic was brought outside the eye.     Double armed Gortex suture was thread through the islet of the superior haptic of the lens. A bend 27G needle was used to enter the sulcus 1.5mm posterior to the limbus at the site of the previous suture. A one partially straightened needle of the double arm Gortex suture on CV-8 was passed through a paracentesis and docked on the 27G needle and then externalized by extracting the 27G needle. A second fresh 27G needle was then passed into the sulcus 1.5mm posterior to the limbus, just temporal to the 12 o'clock radial skinny. Care was taken to ensure that the needle passed above the lens, but under the iris. The other arm of the double arm Gortex suture was then passed through the main corneal incision and docked on this new 27G needle. The suture was then externalized with retraction of the 27G needle. The needles were cut off, and a Sinskey hook was used to retrieve the tails of the sutures, exposing them out the corneal entrance of the tunnel. the sutures  were tightened and knotted using a 3-1-1 tie, tails were cut short, and the knot was buried into a groove in the sclera, ensure balanced tension in the superior and inferior sutures so that the lens centered well.     Attention was then turned to the DSAEK portion of the procedure. Calipers were used to measure the cornea and the decision was made to proceed with a 8.5 mm donor transplant. A marking pen was used to outline the intended area for Descemet's stripping.  Reverse Sinskey hook was used to score endothelium in the area of intended Descemet's stripping. Descemet's membrane was then stripped using the reverse Sinskey hook and Utrata forceps. The Descemet's appeared scarred with fibrosis. Fibrous membranes were also removed from the cornea. An AC maintainer was then inserted in the Paracentesis wound and clamped closed. Irrigation and aspiration was then used to remove all residual viscoelastic material from the eye.   Attention was then directed to the donor cornea. The cornea was trephinated using a 8.5 mm corneal trephine. Healon was placed on the endothelium in order to provide some protection. A 10-0 double arm prolene suture on CTC6 needles was then used to fold the graft over in a taco configuration. The graft was then imbricated with the prolene suture at the peripheral margin where the graft was folded, taking care to remain within the stroma without penetrating the endothelium. The donor graft was then set aside  Attention was then directed to the patient's eye. Calipers were used to measure out an appropriate arc length for extending the superior keratome incision . A pair of 0.12 forceps and a keratome blade were then used to extend the incision while the AC was maintained with irrigation from the AC maintainer. Both needles with the prolene suture imbricating the corneal graft were then passed through the main cornea incision, and then through the distal cornea of the patient, taking care to  ensure that the sutures did not cross and that the sutures were oriented in such a way that the graft would remained endothelial side down once the graft was pulled into the AC. The graft was then pulled into the AC by suture pull-through while the AC was maintained under continuous irrigation. The main cornea incision was then closed with 2 10-0 nylon sutures. The AC maintainer was removed and the paracentesis closed with an additional 10-0 nylon suture. The sutures were rotated and buried. Air on a 30G cannula was then used to fill the AC below the graft, forcing the graft flat up against the host corneal stroma, endothelial side down. The patient was then maintained under full air. The decision was made to keep a full air fill at the end of the case due to presence of the tube shunt in the anterior chamber. The prolene sutures were then cut and tied in an air knot, taking care to avoid any tension which could wrinkle the graft. The wounds were checked and found to be water tight. Subconjunctival injections of ancef and Dexamethasone were administered to the inferior fornix.   The lid speculum and drapes were carefully removed. Maxitrol drops were placed to the operative eye. A patch and shield were taped over the eye. The patient was then taken to the recovery room in stable condition having tolerated the procedure well. The patient was maintained in a supine position in the post-op area. After 1 hour, the patient was examined again. The graft did not appear to be fully attached and no longer had an appropriately sized air bubble, so the graft was then rebubbled with air and was then noted to be attached. The patient was discharged home in good condition. Patient is to follow-up next day at eye clinic for post-operative visit.  Dr. Jose G Hou was scrubbed and present for the entire surgery.

## 2018-12-03 ENCOUNTER — OFFICE VISIT (OUTPATIENT)
Dept: OPHTHALMOLOGY | Facility: CLINIC | Age: 33
End: 2018-12-03
Attending: OPHTHALMOLOGY
Payer: COMMERCIAL

## 2018-12-03 DIAGNOSIS — T85.328A DISLOCATED DESCEMET'S STRIPPING ENDOTHELIAL KERATOPLASTY (DSEK) GRAFT, INITIAL ENCOUNTER: ICD-10-CM

## 2018-12-03 DIAGNOSIS — Z48.810 AFTERCARE FOLLOWING SURGERY OF A SENSORY ORGAN: Primary | ICD-10-CM

## 2018-12-03 PROCEDURE — 66020 INJECTION TREATMENT OF EYE: CPT | Mod: ZF | Performed by: OPHTHALMOLOGY

## 2018-12-03 PROCEDURE — G0463 HOSPITAL OUTPT CLINIC VISIT: HCPCS | Mod: 25

## 2018-12-03 ASSESSMENT — REFRACTION_WEARINGRX
OD_CYLINDER: +3.25
OS_CYLINDER: +2.25
OS_SPHERE: -0.25
OD_AXIS: 125
OD_SPHERE: -6.50
OS_AXIS: 123

## 2018-12-03 ASSESSMENT — PACHYMETRY
OS_CT(UM): 886
OD_CT(UM): 590

## 2018-12-03 ASSESSMENT — VISUAL ACUITY
OS_SC: 20/250
CORRECTION_TYPE: GLASSES
OD_CC+: -2
METHOD: SNELLEN - LINEAR
OD_CC: 20/20
OS_PH_SC: 20/125

## 2018-12-03 ASSESSMENT — SLIT LAMP EXAM - LIDS
COMMENTS: NORMAL
COMMENTS: REACTIVE PTOSIS

## 2018-12-03 ASSESSMENT — TONOMETRY
OD_IOP_MMHG: 13
IOP_METHOD: ICARE
OS_IOP_MMHG: 11

## 2018-12-03 ASSESSMENT — EXTERNAL EXAM - RIGHT EYE: OD_EXAM: NORMAL

## 2018-12-03 ASSESSMENT — CONF VISUAL FIELD
METHOD: COUNTING FINGERS
OD_NORMAL: 1

## 2018-12-03 ASSESSMENT — EXTERNAL EXAM - LEFT EYE: OS_EXAM: NORMAL

## 2018-12-03 NOTE — MR AVS SNAPSHOT
After Visit Summary   12/3/2018    Enrique Gabriel    MRN: [id number]           Patient Information     Date Of Birth          1985        Visit Information        Provider Department      12/3/2018 8:15 AM Jose G Hou MD [facility]        Today's Diagnoses     Aftercare following surgery of a sensory organ    -  1       Follow-ups after your visit        Follow-up notes from your care team     Return in about 1 day (around 12/4/2018).      Your next 10 appointments already scheduled     Dec 10, 2018  8:00 AM CST   RETURN CORNEA with Jose G Hou MD   Eye Clinic ([facility])    [address]  [facility] [address] [phone]            Jan 07, 2019  8:15 AM CST   Post-Op with Jose G Hou MD   Eye Clinic ([facility])    [address] [place] [address] [phone]            Apr 01, 2019 10:15 AM CDT   RETURN GLAUCOMA with Yue Starr MD   [facility] ([facility])    [address]  [facility] [id number] [phone]              Who to contact     Please call your clinic at [phone] to:    Ask questions about your health    Make or cancel appointments    Discuss your medicines    Learn about your test results    Speak to your doctor            Additional Information About Your Visit        DekkunharLittle1 Information     [employer] gives you secure access to your electronic health record. If you see a primary care provider, you can also send messages to your care team and make appointments. If you have questions, please call your primary care clinic.  If you do not have a primary care provider, please call [phone] and they will assist you.      [employer] is an electronic gateway that provides easy, online access to your medical records. With [employer], you can request a clinic  appointment, read your test results, renew a prescription or communicate with your care team.     To access your existing account, please contact your [facility] or call [phone] for assistance.        Care EveryWhere ID     This is your Care EveryWhere ID. This could be used by other organizations to access your [facility] medical records  [id number]         Blood Pressure from Last 3 Encounters:   11/27/18 141/86   01/14/17 137/73    Weight from Last 3 Encounters:   11/27/18 90.7 kg (200 lb)   01/13/17 81.6 kg (180 lb)              Today, you had the following     No orders found for display       Primary Care Provider Office Phone # Fax #    Torsten HOUSER [facility] [phone]       [address] [address]        Equal Access to Services     CHRISSY GERONIMO : Hadii efrain [place] hadasho Soomaali, waaxda luqadaha, qaybta kaalmada adefabbyyada, curt kennedy . So [facility] [phone].    ATENCIÓN: Si habla español, tiene a shepherd disposición servicios gratuitos de asistencia lingüística. Khanh al [phone].    We comply with applicable federal civil rights laws and [place] laws. We do not discriminate on the basis of race, color, national origin, age, disability, sex, sexual orientation, or gender identity.            Thank you!     Thank you for choosing E[facility] CLIN[facility]  for your care. Our goal is always to provide you with excellent care. Hearing back from our patients is one way we can continue to improve our services. Please take a few minutes to complete the written survey that you may receive in the mail after your visit with us. Thank you!             Your Updated Medication List - Protect others around you: Learn how to safely use, store and throw away your medicines at www.disposemymeds.org.          This list is accurate as of 12/3/18 11:33 AM.  Always use your most recent med list.                   Brand Name  Dispense Instructions for use Diagnosis    brimonidine 0.2 % ophthalmic solution    ALPHAGAN     Apply 1 drop to eye 2 times daily        dorzolamide-timolol 2-0.5 % ophthalmic solution    COSOPT    1 Bottle    Place 1 drop Into the left eye 2 times daily    Steroid induced glaucoma, left eye       hypromellose 0.3 % Gel ophthalmic gel    GENTEAL    10 g    Place 0.5 g (0.5 inches) Into the left eye At Bedtime Apply approximately a half inch ribbon of ointment to the inside of your lower lids. Warning, application of the ointment to your eyes will cause temporary blurring of your vision from the ointment coating your eye. Please apply right before bedtime.    Ruptured globe of left eye, initial encounter       latanoprost 0.005 % ophthalmic solution    XALATAN    1 Bottle    Place 1 drop Into the left eye At Bedtime    Glaucoma associated with ocular trauma, left, stage unspecified       LISINOPRIL PO      Take 20 mg by mouth daily        ofloxacin 0.3 % ophthalmic solution    OCUFLOX    1 Bottle    Place 1 drop Into the left eye 4 times daily    Corneal edema of left eye       * prednisoLONE acetate 1 % ophthalmic suspension    PRED FORTE    1 Bottle    Place 1 drop Into the left eye daily    History of cornea transplant, Rejection of cornea transplant       * prednisoLONE acetate 1 % ophthalmic suspension    PRED FORTE    5 mL    Place 1 drop Into the left eye 4 times daily    Corneal edema of left eye       timolol maleate 0.5 % ophthalmic solution    TIMOPTIC    1 Bottle    Place 1 drop Into the left eye every morning    Steroid induced glaucoma, both eyes, Glaucoma associated with ocular trauma, left, stage unspecified       * Notice:  This list has 2 medication(s) that are the same as other medications prescribed for you. Read the directions carefully, and ask your doctor or other care provider to review them with you.

## 2018-12-03 NOTE — PROGRESS NOTES
CC: POW#1 s/p DSAEK, ant vit, refixated scleral sutured IOL left eye    HPI: Enrique Gabriel is a 33 year old male s/p globe repair, left eye. 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 Presented with dehiscence of cornea at limbus from 9 o'clock to 3'oclock with extremely hypotonous eye, vitreous in the anterior chamber, dislocated IOL, and vitreous hemorrhage. questionable Retinal detachment. Underwent resuturing of cornea to limbus and limited peritomy without evidence of scleral laceration.    Interval history:  Vision improving since last visit.  Using drops as instructed.  No new flashes, floater, diplopia. No pain, redness, tearing.     POHx;  PKP for keratoconus OU 2002  Trauma to OS with ruptured glob 2003  Repeat PKP OS 2004  Previous cataract surgery done at [facility] in [place].   Secondary IOL left eye in 2003.   H/o graft rejection left eye treated in 2011.  Ahmed tube OS 10/3/17    Gtts:  Combigan BID left eye  LD yesterday morning  Cosopt BID left eye  LD yesterday morning   Latanoprost at bedtime left eye LD 2 days ago  Prednisolone twice a day left eye   Prednisolone every other day right eye     Assessment/Plan:    1.  s/p repair of globe rupture/corneal dehiscence left eye (1/14/17)    2. S/p DSAEK, ant vit, refixated scleral Gortex sutured IOL - left eye (11/27/18)   -graft clear centrally and inferiorly; fibrous ingrowth superotemporally    -continue pred and oflox qid   -scleral sutured lens well fixated although eccentric   -small amount of fluid in graft interface (more fluid compared to last week), will keep prolene suture in place   -recommend re-bubble today    -R/B/A discussed, informed consent obtained   -performed in procedure room     2. AC vitreous prolapse OS   - s/p ant vitrectomy 11/27/18    3. Traumatic Aniridia OS    4. Glaucoma, OS   s/p left Ahmed valve   Continue  intraocular pressure gtts as above, patient scheduled to see Dr. Starr early may.     Steroid responder   Continue to follow-up with Dr. Starr   IOP WNL, monitor in postoperative period, consider resuming only 1 drop    Follow up tomorrow for bubble check    John Solis MD  PGY-3 Ophthalmology Resident  [phone]    Attending Physician Attestation:  Complete documentation of historical and exam elements from today's encounter can be found in the full encounter summary report (not reduplicated in this progress note).  I personally obtained the chief complaint(s) and history of present illness.  I confirmed and edited as necessary the review of systems, past medical/surgical history, family history, social history, and examination findings as documented by others; and I examined the patient myself.  I personally reviewed the relevant tests, images, and reports as documented above.  I formulated and edited as necessary the assessment and plan and discussed the findings and management plan with the patient and family. I was present for the entire procedure(s). - Jose G Hou MD      PROCEDURE NOTE:    Patient Name: Enrique Gabriel  Date: December 3, 2018    Pre-op Dx: Dislocated endothelial keratoplasty graft, left eye  Post-op Dx: Same    Procedure: Rebubbling with Air, left eye  Attending: Jose G Hou MD  Fellow: Ioana Grace MD    Procedure:  After discussion of the risks, benefits, and alternatives of the procedure, informed consent was obtained. The appropriate eye was marked. The surgical eye was then anesthesized using topical Akten lidocaine gel and topical proparacaine drops. Several drops of ofloxacin were placed in the surgical eye. The surgical eye was then prepped using a drop of betadine on the ocular surface and betadine swaps to the lids.     The patient was then positioned appropriately supine in the procedure room. A lid speculum was then placed to provide exposure. A 30G  cannula attached to a IV tubing and a5 ml syringe with filtered air was then introduced through a paracentesis incision. Air was injected to fill the anterior chamber. Intraocular pressure was noted to be acceptable with sufficient clearance of the pupillary margin to prevent pupillary block.    The lid speculum was then removed. Several drops of ofloxacin was placed in the surgical eye. The patient was examined using a portable slit lamp and was noted to be attached. The patient was asked to remain supine for 1 hour. After that he was re-examined and noted to be attached with good air fill and no evidence of pupillary block and soft by palpation. The patient was then discharged home    The patient tolerated the procedure well with instructions to follow-up in clinic as scheduled.    Ioana Grace MD  PGY-5, Cornea Fellow    Attending Physician Attestation:  Complete documentation of historical and exam elements from today's encounter can be found in the full encounter summary report (not reduplicated in this progress note).  I personally obtained the chief complaint(s) and history of present illness.  I confirmed and edited as necessary the review of systems, past medical/surgical history, family history, social history, and examination findings as documented by others; and I examined the patient myself.  I personally reviewed the relevant tests, images, and reports as documented above.  I formulated and edited as necessary the assessment and plan and discussed the findings and management plan with the patient and family. I was present for the entire procedure(s). - Jose G Hou MD

## 2018-12-03 NOTE — NURSING NOTE
Chief Complaints and History of Present Illnesses   Patient presents with     Follow Up For     5 day follow up  s/p DSAEK, ant vit, refixated scleral sutured IOL left eye     HPI    Affected eye(s):  Left   Symptoms:     No floaters   No flashes   No tearing   No Dryness   No itching         Do you have eye pain now?:  No      Comments:  Pt states vision has cleared slightly since last visit. No eye pain today, just soreness. Still having redness in LE, improved from last visit.    Jamar FISCHER December 3, 2018 8:30 AM

## 2018-12-10 ENCOUNTER — HOSPITAL ENCOUNTER (OUTPATIENT)
Facility: AMBULATORY SURGERY CENTER | Age: 33
End: 2018-12-10
Attending: OPHTHALMOLOGY
Payer: COMMERCIAL

## 2018-12-10 ENCOUNTER — ANESTHESIA (OUTPATIENT)
Dept: SURGERY | Facility: AMBULATORY SURGERY CENTER | Age: 33
End: 2018-12-10

## 2018-12-10 ENCOUNTER — OFFICE VISIT (OUTPATIENT)
Dept: OPHTHALMOLOGY | Facility: CLINIC | Age: 33
End: 2018-12-10
Attending: OPHTHALMOLOGY
Payer: COMMERCIAL

## 2018-12-10 ENCOUNTER — ANESTHESIA EVENT (OUTPATIENT)
Dept: SURGERY | Facility: AMBULATORY SURGERY CENTER | Age: 33
End: 2018-12-10

## 2018-12-10 VITALS
RESPIRATION RATE: 16 BRPM | WEIGHT: 200 LBS | DIASTOLIC BLOOD PRESSURE: 87 MMHG | OXYGEN SATURATION: 96 % | HEIGHT: 66 IN | BODY MASS INDEX: 32.14 KG/M2 | SYSTOLIC BLOOD PRESSURE: 140 MMHG | TEMPERATURE: 97.3 F

## 2018-12-10 DIAGNOSIS — T85.328A DISLOCATED DESCEMET'S STRIPPING ENDOTHELIAL KERATOPLASTY (DSEK) GRAFT, INITIAL ENCOUNTER: Primary | ICD-10-CM

## 2018-12-10 DIAGNOSIS — Z94.7 CORNEA REPLACED BY TRANSPLANT: ICD-10-CM

## 2018-12-10 DIAGNOSIS — T85.328S DISLOCATED DESCEMET'S STRIPPING ENDOTHELIAL KERATOPLASTY (DSEK) GRAFT, SEQUELA: Primary | ICD-10-CM

## 2018-12-10 DIAGNOSIS — Z48.810 SURGICAL AFTERCARE, SENSE ORGANS: ICD-10-CM

## 2018-12-10 PROCEDURE — G0463 HOSPITAL OUTPT CLINIC VISIT: HCPCS | Mod: ZF

## 2018-12-10 RX ORDER — TETRACAINE HYDROCHLORIDE 5 MG/ML
SOLUTION OPHTHALMIC PRN
Status: DISCONTINUED | OUTPATIENT
Start: 2018-12-10 | End: 2018-12-10 | Stop reason: HOSPADM

## 2018-12-10 RX ORDER — NEOMYCIN POLYMYXIN B SULFATES AND DEXAMETHASONE 3.5; 10000; 1 MG/ML; [USP'U]/ML; MG/ML
1 SUSPENSION/ DROPS OPHTHALMIC 4 TIMES DAILY
Qty: 5 ML | Refills: 0 | Status: SHIPPED | OUTPATIENT
Start: 2018-12-10 | End: 2019-02-18

## 2018-12-10 RX ORDER — SODIUM CHLORIDE, SODIUM LACTATE, POTASSIUM CHLORIDE, CALCIUM CHLORIDE 600; 310; 30; 20 MG/100ML; MG/100ML; MG/100ML; MG/100ML
INJECTION, SOLUTION INTRAVENOUS CONTINUOUS PRN
Status: DISCONTINUED | OUTPATIENT
Start: 2018-12-10 | End: 2018-12-10

## 2018-12-10 RX ORDER — SODIUM CHLORIDE, SODIUM LACTATE, POTASSIUM CHLORIDE, CALCIUM CHLORIDE 600; 310; 30; 20 MG/100ML; MG/100ML; MG/100ML; MG/100ML
INJECTION, SOLUTION INTRAVENOUS CONTINUOUS
Status: DISCONTINUED | OUTPATIENT
Start: 2018-12-10 | End: 2018-12-11 | Stop reason: HOSPADM

## 2018-12-10 RX ORDER — ONDANSETRON 2 MG/ML
INJECTION INTRAMUSCULAR; INTRAVENOUS PRN
Status: DISCONTINUED | OUTPATIENT
Start: 2018-12-10 | End: 2018-12-10

## 2018-12-10 RX ORDER — LIDOCAINE HYDROCHLORIDE 20 MG/ML
INJECTION, SOLUTION INFILTRATION; PERINEURAL PRN
Status: DISCONTINUED | OUTPATIENT
Start: 2018-12-10 | End: 2018-12-10

## 2018-12-10 RX ORDER — ONDANSETRON 2 MG/ML
4 INJECTION INTRAMUSCULAR; INTRAVENOUS EVERY 30 MIN PRN
Status: DISCONTINUED | OUTPATIENT
Start: 2018-12-10 | End: 2018-12-11 | Stop reason: HOSPADM

## 2018-12-10 RX ORDER — SODIUM CHLORIDE, SODIUM LACTATE, POTASSIUM CHLORIDE, CALCIUM CHLORIDE 600; 310; 30; 20 MG/100ML; MG/100ML; MG/100ML; MG/100ML
INJECTION, SOLUTION INTRAVENOUS CONTINUOUS
Status: DISCONTINUED | OUTPATIENT
Start: 2018-12-10 | End: 2018-12-10 | Stop reason: HOSPADM

## 2018-12-10 RX ORDER — PROPARACAINE HYDROCHLORIDE 5 MG/ML
1 SOLUTION/ DROPS OPHTHALMIC ONCE
Status: COMPLETED | OUTPATIENT
Start: 2018-12-10 | End: 2018-12-10

## 2018-12-10 RX ORDER — NALOXONE HYDROCHLORIDE 0.4 MG/ML
.1-.4 INJECTION, SOLUTION INTRAMUSCULAR; INTRAVENOUS; SUBCUTANEOUS
Status: DISCONTINUED | OUTPATIENT
Start: 2018-12-10 | End: 2018-12-11 | Stop reason: HOSPADM

## 2018-12-10 RX ORDER — MEPERIDINE HYDROCHLORIDE 25 MG/ML
12.5 INJECTION INTRAMUSCULAR; INTRAVENOUS; SUBCUTANEOUS
Status: DISCONTINUED | OUTPATIENT
Start: 2018-12-10 | End: 2018-12-11 | Stop reason: HOSPADM

## 2018-12-10 RX ORDER — HYDROMORPHONE HYDROCHLORIDE 1 MG/ML
.3-.5 INJECTION, SOLUTION INTRAMUSCULAR; INTRAVENOUS; SUBCUTANEOUS EVERY 10 MIN PRN
Status: DISCONTINUED | OUTPATIENT
Start: 2018-12-10 | End: 2018-12-11 | Stop reason: HOSPADM

## 2018-12-10 RX ORDER — PROPOFOL 10 MG/ML
INJECTION, EMULSION INTRAVENOUS PRN
Status: DISCONTINUED | OUTPATIENT
Start: 2018-12-10 | End: 2018-12-10

## 2018-12-10 RX ORDER — FENTANYL CITRATE 50 UG/ML
25-50 INJECTION, SOLUTION INTRAMUSCULAR; INTRAVENOUS
Status: DISCONTINUED | OUTPATIENT
Start: 2018-12-10 | End: 2018-12-10 | Stop reason: HOSPADM

## 2018-12-10 RX ORDER — ONDANSETRON 4 MG/1
4 TABLET, ORALLY DISINTEGRATING ORAL EVERY 30 MIN PRN
Status: DISCONTINUED | OUTPATIENT
Start: 2018-12-10 | End: 2018-12-11 | Stop reason: HOSPADM

## 2018-12-10 RX ORDER — PHENYLEPHRINE HYDROCHLORIDE 25 MG/ML
1 SOLUTION/ DROPS OPHTHALMIC
Status: COMPLETED | OUTPATIENT
Start: 2018-12-10 | End: 2018-12-10

## 2018-12-10 RX ORDER — FENTANYL CITRATE 50 UG/ML
25-50 INJECTION, SOLUTION INTRAMUSCULAR; INTRAVENOUS
Status: DISCONTINUED | OUTPATIENT
Start: 2018-12-10 | End: 2018-12-11 | Stop reason: HOSPADM

## 2018-12-10 RX ORDER — PROPOFOL 10 MG/ML
INJECTION, EMULSION INTRAVENOUS CONTINUOUS PRN
Status: DISCONTINUED | OUTPATIENT
Start: 2018-12-10 | End: 2018-12-10

## 2018-12-10 RX ORDER — OXYCODONE HYDROCHLORIDE 5 MG/1
5-10 TABLET ORAL EVERY 4 HOURS PRN
Status: DISCONTINUED | OUTPATIENT
Start: 2018-12-10 | End: 2018-12-11 | Stop reason: HOSPADM

## 2018-12-10 RX ORDER — DEXAMETHASONE SODIUM PHOSPHATE 4 MG/ML
INJECTION, SOLUTION INTRA-ARTICULAR; INTRALESIONAL; INTRAMUSCULAR; INTRAVENOUS; SOFT TISSUE PRN
Status: DISCONTINUED | OUTPATIENT
Start: 2018-12-10 | End: 2018-12-10 | Stop reason: HOSPADM

## 2018-12-10 RX ORDER — BALANCED SALT SOLUTION 6.4; .75; .48; .3; 3.9; 1.7 MG/ML; MG/ML; MG/ML; MG/ML; MG/ML; MG/ML
SOLUTION OPHTHALMIC PRN
Status: DISCONTINUED | OUTPATIENT
Start: 2018-12-10 | End: 2018-12-10 | Stop reason: HOSPADM

## 2018-12-10 RX ORDER — DEXAMETHASONE SODIUM PHOSPHATE 4 MG/ML
4 INJECTION, SOLUTION INTRA-ARTICULAR; INTRALESIONAL; INTRAMUSCULAR; INTRAVENOUS; SOFT TISSUE EVERY 10 MIN PRN
Status: DISCONTINUED | OUTPATIENT
Start: 2018-12-10 | End: 2018-12-11 | Stop reason: HOSPADM

## 2018-12-10 RX ORDER — CYCLOPENTOLATE HYDROCHLORIDE 10 MG/ML
1 SOLUTION/ DROPS OPHTHALMIC
Status: COMPLETED | OUTPATIENT
Start: 2018-12-10 | End: 2018-12-10

## 2018-12-10 RX ORDER — ATROPINE SULFATE 10 MG/ML
SOLUTION/ DROPS OPHTHALMIC PRN
Status: DISCONTINUED | OUTPATIENT
Start: 2018-12-10 | End: 2018-12-10 | Stop reason: HOSPADM

## 2018-12-10 RX ORDER — ALBUTEROL SULFATE 0.83 MG/ML
2.5 SOLUTION RESPIRATORY (INHALATION) EVERY 4 HOURS PRN
Status: DISCONTINUED | OUTPATIENT
Start: 2018-12-10 | End: 2018-12-10 | Stop reason: HOSPADM

## 2018-12-10 RX ORDER — TROPICAMIDE 10 MG/ML
1 SOLUTION/ DROPS OPHTHALMIC
Status: COMPLETED | OUTPATIENT
Start: 2018-12-10 | End: 2018-12-10

## 2018-12-10 RX ORDER — LIDOCAINE 40 MG/G
CREAM TOPICAL
Status: DISCONTINUED | OUTPATIENT
Start: 2018-12-10 | End: 2018-12-10 | Stop reason: HOSPADM

## 2018-12-10 RX ORDER — FENTANYL CITRATE 50 UG/ML
INJECTION, SOLUTION INTRAMUSCULAR; INTRAVENOUS PRN
Status: DISCONTINUED | OUTPATIENT
Start: 2018-12-10 | End: 2018-12-10

## 2018-12-10 RX ORDER — KETOROLAC TROMETHAMINE 30 MG/ML
30 INJECTION, SOLUTION INTRAMUSCULAR; INTRAVENOUS EVERY 6 HOURS PRN
Status: DISCONTINUED | OUTPATIENT
Start: 2018-12-10 | End: 2018-12-11 | Stop reason: HOSPADM

## 2018-12-10 RX ADMIN — TROPICAMIDE 1 DROP: 10 SOLUTION/ DROPS OPHTHALMIC at 13:03

## 2018-12-10 RX ADMIN — FENTANYL CITRATE 50 MCG: 50 INJECTION, SOLUTION INTRAMUSCULAR; INTRAVENOUS at 13:11

## 2018-12-10 RX ADMIN — ONDANSETRON 4 MG: 2 INJECTION INTRAMUSCULAR; INTRAVENOUS at 13:18

## 2018-12-10 RX ADMIN — PROPOFOL 60 MG: 10 INJECTION, EMULSION INTRAVENOUS at 13:15

## 2018-12-10 RX ADMIN — TROPICAMIDE 1 DROP: 10 SOLUTION/ DROPS OPHTHALMIC at 12:57

## 2018-12-10 RX ADMIN — PROPOFOL 100 MCG/KG/MIN: 10 INJECTION, EMULSION INTRAVENOUS at 13:15

## 2018-12-10 RX ADMIN — CYCLOPENTOLATE HYDROCHLORIDE 1 DROP: 10 SOLUTION/ DROPS OPHTHALMIC at 12:57

## 2018-12-10 RX ADMIN — PHENYLEPHRINE HYDROCHLORIDE 1 DROP: 25 SOLUTION/ DROPS OPHTHALMIC at 12:57

## 2018-12-10 RX ADMIN — CYCLOPENTOLATE HYDROCHLORIDE 1 DROP: 10 SOLUTION/ DROPS OPHTHALMIC at 12:53

## 2018-12-10 RX ADMIN — OXYCODONE HYDROCHLORIDE 5 MG: 5 TABLET ORAL at 14:28

## 2018-12-10 RX ADMIN — CYCLOPENTOLATE HYDROCHLORIDE 1 DROP: 10 SOLUTION/ DROPS OPHTHALMIC at 13:03

## 2018-12-10 RX ADMIN — SODIUM CHLORIDE, SODIUM LACTATE, POTASSIUM CHLORIDE, CALCIUM CHLORIDE: 600; 310; 30; 20 INJECTION, SOLUTION INTRAVENOUS at 13:10

## 2018-12-10 RX ADMIN — PROPARACAINE HYDROCHLORIDE 1 DROP: 5 SOLUTION/ DROPS OPHTHALMIC at 12:53

## 2018-12-10 RX ADMIN — PHENYLEPHRINE HYDROCHLORIDE 1 DROP: 25 SOLUTION/ DROPS OPHTHALMIC at 13:03

## 2018-12-10 RX ADMIN — SODIUM CHLORIDE, SODIUM LACTATE, POTASSIUM CHLORIDE, CALCIUM CHLORIDE: 600; 310; 30; 20 INJECTION, SOLUTION INTRAVENOUS at 12:55

## 2018-12-10 RX ADMIN — LIDOCAINE HYDROCHLORIDE 100 MG: 20 INJECTION, SOLUTION INFILTRATION; PERINEURAL at 13:15

## 2018-12-10 RX ADMIN — TROPICAMIDE 1 DROP: 10 SOLUTION/ DROPS OPHTHALMIC at 12:53

## 2018-12-10 RX ADMIN — PHENYLEPHRINE HYDROCHLORIDE 1 DROP: 25 SOLUTION/ DROPS OPHTHALMIC at 12:53

## 2018-12-10 ASSESSMENT — REFRACTION_WEARINGRX
OD_CYLINDER: +3.25
OS_CYLINDER: +2.25
OD_AXIS: 125
OS_SPHERE: -0.25
OD_SPHERE: -6.50
OS_AXIS: 123

## 2018-12-10 ASSESSMENT — VISUAL ACUITY
OD_CC: 20/20
OS_CC: 20/300
METHOD: SNELLEN - LINEAR
CORRECTION_TYPE: GLASSES
OS_PH_CC: 20/125

## 2018-12-10 ASSESSMENT — SLIT LAMP EXAM - LIDS
COMMENTS: REACTIVE PTOSIS
COMMENTS: NORMAL

## 2018-12-10 ASSESSMENT — MIFFLIN-ST. JEOR: SCORE: 1799.94

## 2018-12-10 ASSESSMENT — EXTERNAL EXAM - RIGHT EYE: OD_EXAM: NORMAL

## 2018-12-10 ASSESSMENT — EXTERNAL EXAM - LEFT EYE: OS_EXAM: NORMAL

## 2018-12-10 ASSESSMENT — TONOMETRY
IOP_METHOD: ICARE
OD_IOP_MMHG: 12
OS_IOP_MMHG: 16

## 2018-12-10 ASSESSMENT — LIFESTYLE VARIABLES: TOBACCO_USE: 1

## 2018-12-10 NOTE — OP NOTE
SURGEON: Jennifer Teresa M.D.  Assistant surgeon: Dr. 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  DESCRIPTION OF THE PROCEDURE   The patient was brought into the OR where Monitored Anesthesia Care was given by the anesthesia department. A peribulbar block consistent of a 1:1 mixtures of 2% Lidocaine and 0.75 % of marcaine with epinephrine and wydase was administered.   The eye was then prepped and draped in the usual fashion for ophthalmic surgery, including the installation of one drop of 5% povidone iodine.  Attention was then turned to the vitrectomy. Marks were made on the sclera inferotemporally, superotemporally, and superonasally 3.5 mm posterior to the limbus. The 25g transscleral cannulas were inserted through the sclera using the trocars. The infusion cannula was connected inferotemporally and directly visualized to verify it was in the correct location. The vitrector handpiece and endoilluiminator were placed in the eye. Upon entering the eye it was noted that the patient had a cloudy cornea; the Partial thickness corneal transplant endothelial keratoplasty graft appears detached; there was a sutured intraocular lens displaced superior nasally and a tube in the Anterior chamber.  A Pars plana vitrectomy (PPV) was performed. Peripheral vitrectomy was assisted with scleral depression and kenalog injection.  Next, a  paracenthesis was made inferior nasally. the tube was revised and healon was injected into the tube with a 27g cannula.   Next an air fluid exchange was performed using the vitrector on aspiration mode and the light pipe. Two 10-0 nylon cornea sutures were placed at the paracenthesis site.  Next, The cannulas were removed. The sclerotomies were closed with 6-0 plain gut sutures. The pressure was checked and verified to be appropriate.  Subconjunctival injections of ancef and dexamethasone were administered.   The lid speculum was removed. The eye was cleaned with wet and dry gauze. A bandage contact lens was placed in the eye. A drop of atropine and maxitrol ointment was placed in the eye. An eye pad and shahid shield were taped over the eye.   The patient tolerated well the procedure and was discharge to the post-operative unit in stable conditions with no complications.  The surgery was assisted by Dr. Anderson. Due to the delicate and complex nature of this surgery, Dr. Anderson was required. Dr. Anderson assisted with scleral depression. I was present for the entire surgery.

## 2018-12-10 NOTE — NURSING NOTE
Chief Complaints and History of Present Illnesses   Patient presents with     Post Op (Ophthalmology) Left Eye

## 2018-12-10 NOTE — ANESTHESIA CARE TRANSFER NOTE
Patient: Enrique Gabriel    Procedure(s):  LEFT EYE VITRECTOMY PARSPLANA WITH 25 GAUGE SYSTEM, INFUSION OF AIR Fluid exchange and infusion of 10% SF6 gas    Diagnosis: Detached DESK  Diagnosis Additional Information: No value filed.    Anesthesia Type:   No value filed.     Note:  Airway :Room Air  Patient transferred to:Phase II  Comments: VSS and WNL, comfortable, no PONV, report to RNHandoff Report: Identifed the Patient, Identified the Reponsible Provider, Reviewed the pertinent medical history, Discussed the surgical course, Reviewed Intra-OP anesthesia mangement and issues during anesthesia, Set expectations for post-procedure period and Allowed opportunity for questions and acknowledgement of understanding      Vitals: (Last set prior to Anesthesia Care Transfer)    CRNA VITALS  12/10/2018 1338 - 12/10/2018 1415      12/10/2018             Pulse:  81    SpO2:  93 %    Resp Rate (set):  10                Electronically Signed By: MARK Perez CRNA  December 10, 2018  2:15 PM

## 2018-12-10 NOTE — PROGRESS NOTES
CC: POW#2 s/p DSAEK, ant vit, refixated scleral sutured IOL left eye 11/27/8    HPI: Enrique Gabriel is a 33 year old male s/p globe repair, left eye. 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 Presented with dehiscence of cornea at limbus from 9 o'clock to 3'oclock with extremely hypotonous eye, vitreous in the anterior chamber, dislocated IOL, and vitreous hemorrhage. questionable Retinal detachment. Underwent resuturing of cornea to limbus and limited peritomy without evidence of scleral laceration.    Interval history:  - Vision a little better after rebubble last visit 1 week ago. Vision blurrier in the morning.    POHx;  PKP for keratoconus OU 2002  Trauma to OS with ruptured glob 2003  Repeat PKP OS 2004  Previous cataract surgery done at [facility] in [place].   Secondary IOL left eye in 2003.   H/o graft rejection left eye treated in 2011.  Ahmed tube OS 10/3/17    Gtts:  Combigan BID left eye  LD this AM  Cosopt BID left eye  LD this AM  Latanoprost at bedtime left eye LD last night  Prednisolone QID left eye   Prednisolone every other day right eye   Ofloxacin QID left eye    Assessment/Plan:    1.  s/p repair of globe rupture/corneal dehiscence left eye (1/14/17)    2. S/p DSAEK, ant vit, refixated scleral Gortex sutured IOL - left eye (11/27/18)   -graft clear centrally and inferiorly; fibrous ingrowth superotemporally    -continue pred and oflox qid   -scleral sutured lens well fixated although eccentric   -prolene suture in place   -s/p rebubble in procedure room 12/3/18   -graft still detached today, plan for complete air fluid exchange with Dr. Teresa later today at [facility]     2. AC vitreous prolapse OS   - s/p ant vitrectomy 11/27/18    3. Traumatic Aniridia OS    4. Glaucoma, OS   s/p left Ahmed valve   Continue intraocular pressure gtts as above, patient scheduled to see Dr. Starr early may.      Steroid responder   Continue to follow-up with Dr. Starr   IOP WNL, cont above regimen    Ioana Grace MD  PGY-5, Cornea Fellow    Attending Physician Attestation:  Complete documentation of historical and exam elements from today's encounter can be found in the full encounter summary report (not reduplicated in this progress note).  I personally obtained the chief complaint(s) and history of present illness.  I confirmed and edited as necessary the review of systems, past medical/surgical history, family history, social history, and examination findings as documented by others; and I examined the patient myself.  I personally reviewed the relevant tests, images, and reports as documented above.  I formulated and edited as necessary the assessment and plan and discussed the findings and management plan with the patient and family. - Jose G Hou MD    I personally spent great than 40min with the patient, of which >50% of the time was spent face to face with the patient, counseling and coordinating care with the patient. We discussed the complexity of his diagnosis, the need for further information prior to proceeding with yet another surgery, and the unknown prognosis for the patient at this time.    Jose G Hou MD

## 2018-12-10 NOTE — ANESTHESIA POSTPROCEDURE EVALUATION
Anesthesia POST Procedure Evaluation    Patient: Enrique Gabriel   MRN:     [id number] Gender:   male   Age:    33 year old :      1985        Preoperative Diagnosis: Detached DESK   Procedure(s):  LEFT EYE VITRECTOMY PARSPLANA WITH 25 GAUGE SYSTEM, INFUSION OF AIR Fluid exchange and infusion of 10% SF6 gas   Postop Comments: No value filed.       Anesthesia Type:  MAC    Reportable Event: NO     PAIN: Uncomplicated   Sign Out status: Comfortable, Well controlled pain     PONV: No PONV   Sign Out status:  No Nausea or Vomiting     Neuro/Psych: Uneventful perioperative course   Sign Out Status: Preoperative baseline; Age appropriate mentation     Airway/Resp.: Uneventful perioperative course   Sign Out Status: Non labored breathing, age appropriate RR; Resp. Status within EXPECTED Parameters     CV: Uneventful perioperative course   Sign Out status: Appropriate BP and perfusion indices; Appropriate HR/Rhythm     Disposition:   Sign Out in:  PACU  Disposition:  Phase II; Home  Recovery Course: Uneventful  Follow-Up: Not required           Last Anesthesia Record Vitals:  CRNA VITALS  12/10/2018 1338 - 12/10/2018 1438      12/10/2018             Pulse:  81    SpO2:  93 %    Resp Rate (set):  10          Last PACU/Preop Vitals:  Vitals:    12/10/18 1416 12/10/18 1430 12/10/18 1445   BP: 132/84 143/86 140/87   Resp: 16 16 16   Temp: 36.3  C (97.3  F)     SpO2: 97%  96%         Electronically Signed By: King Sorensen MD, December 10, 2018, 3:53 PM

## 2018-12-10 NOTE — BRIEF OP NOTE
[facility] Brief Operative Note    Pre-operative diagnosis: Detached DESK   Post-operative diagnosis Detached DSEK   Procedure: Procedure(s):  LEFT EYE VITRECTOMY PARSPLANA WITH 25 GAUGE SYSTEM, INFUSION OF AIR INTO ANTERIOR CHAMBER   Surgeon(s): Surgeon(s) and Role:     * Jennifer Teresa MD - Primary   Evan Anderson MD   Estimated blood loss: < 5 cc    Specimens: None   Findings: HAMZAH Anderson M.D.  PGY-3, Ophthalmology

## 2018-12-10 NOTE — ANESTHESIA PREPROCEDURE EVALUATION
Anesthesia Pre-Procedure Evaluation    Patient: Enrique Gabriel   MRN:     [id number] Gender:   male   Age:    33 year old :      1985        Preoperative Diagnosis: Detached DESK   Procedure(s):  LEFT EYE VITRECTOMY PARSPLANA WITH 25 GAUGE SYSTEM, INFUSION OF AIR INTO ANTERIOR CHAMBER     No past medical history on file.   Past Surgical History:   Procedure Laterality Date     C ANESTH,CORNEAL TRANSPLANT Bilateral      CATARACT IOL, RT/LT Left      EYE SURGERY       KERATOPLASTY DESCEMETS STRIPPING ENDOTHELIAL (DSEK) Left 2018    Procedure: Left Eye Descemet Stripping Automated Endothelial Keratoplasty;  Surgeon: Jose G Hou MD;  Location: [facility]     REPAIR RUPTURED GLOBE Left 2017    Procedure: REPAIR RUPTURED GLOBE;  Surgeon: Maryann Monroe MD;  Location: [facility] OR     REPOSITION INTRAOCULAR LENS  2018    Procedure: REPOSITION INTRAOCULAR LENS;  Surgeon: Jose G Hou MD;  [facility]: [facility]     VITRECTOMY ANTERIOR Left 2018    Procedure: Anterior Vitrectomy;  Surgeon: Jose G Hou MD;  Location: [facility]          Anesthesia Evaluation     . Pt has had prior anesthetic. Type: General           ROS/MED HX    ENT/Pulmonary: Comment: Previous ruptured globe - neg pulmonary ROS   (+)tobacco use, Current use 0.5 packs/day  , . .    Neurologic:  - neg neurologic ROS     Cardiovascular:     (+) hypertension (untreated)----. : . . . :. .       METS/Exercise Tolerance:  >4 METS   Hematologic:  - neg hematologic  ROS       Musculoskeletal:  - neg musculoskeletal ROS       GI/Hepatic:  - neg GI/hepatic ROS       Renal/Genitourinary:  - ROS Renal section negative       Endo: Comment: BMI 32.35    (+) Obesity, .      Psychiatric:  - neg psychiatric ROS       Infectious Disease:  - neg infectious disease ROS       Malignancy:      - no malignancy   Other:                         PHYSICAL EXAM:   Mental Status/Neuro: A/A/O   Airway: Facies: Feasible  Mallampati:  "II  Mouth/Opening: Full  TM distance: > 6 cm  Neck ROM: Full   Respiratory: Auscultation: CTAB     Resp. Rate: Normal     Resp. Effort: Normal      CV: Rhythm: Regular  Heart: Normal Sounds   Comments:      Dental: Normal                  No results found for: WBC, HGB, HCT, PLT, CRP, SED, NA, POTASSIUM, CHLORIDE, CO2, BUN, CR, GLC, WINSTON, PHOS, MAG, ALBUMIN, PROTTOTAL, ALT, AST, GGT, ALKPHOS, BILITOTAL, BILIDIRECT, LIPASE, AMYLASE, TIFFANY, PTT, INR, FIBR, TSH, T4, T3, HCG, HCGS, CKTOTAL, CKMB, TROPN    Preop Vitals  BP Readings from Last 3 Encounters:   11/27/18 141/86   01/14/17 137/73    Pulse Readings from Last 3 Encounters:   11/27/18 83      Resp Readings from Last 3 Encounters:   11/27/18 16   01/14/17 16    SpO2 Readings from Last 3 Encounters:   11/27/18 98%   01/14/17 98%      Temp Readings from Last 1 Encounters:   11/27/18 36.8  C (98.3  F) (Temporal)    Ht Readings from Last 1 Encounters:   11/27/18 1.676 m (5' 6\")      Wt Readings from Last 1 Encounters:   11/27/18 90.7 kg (200 lb)    Estimated body mass index is 32.28 kg/m  as calculated from the following:    Height as of 11/27/18: 1.676 m (5' 6\").    Weight as of 11/27/18: 90.7 kg (200 lb).     LDA:            Assessment:   ASA SCORE: 2    NPO Status: > 2 hours since completed Clear Liquids; > 6 hours since completed Solid Foods   Documentation: H&P complete; Preop Testing complete; Consents complete   Proceeding: Proceed without further delay  Tobacco Use:  NO Active use of Tobacco/UNKNOWN Tobacco use status     Plan:   Anes. Type:  MAC      Induction:  Not applicable   Airway: Native Airway   Access/Monitoring: PIV   Maintenance: N/a   Emergence: N/a   Logistics: Same Day Surgery     Postop Pain/Sedation Strategy:  Standard-Options: Opioids PRN     PONV Management:  Adult Risk Factors:, Non-Smoker, Postop Opioids     CONSENT: Direct conversation   Plan and risks discussed with: Patient          Comments for Plan/Consent:  Risks, benefits, and " alternatives of MAC discussed with patient. They understand the risks including possible recall of events in the room. They understand there is a possibility that conversion to general anesthesia may be needed. Patient consents.                           Demi Adorno MD

## 2018-12-10 NOTE — DISCHARGE INSTRUCTIONS
[facility]  [phone]  Post Operative Eye Surgery Instructions    MD Jennifer Alexandra MD  Will I have pain?  Some discomfort is normal and expected following surgery. The first few days after surgery you may need to use prescription pain pills. Taking Tylenol (acetaminophen) regularly may help prevent pain.  Discomfort should gradually decrease and Tylenol should be sufficient to relieve pain. A foreign body sensation in the cornea of the eye is very common and caused by sutures placed at surgery. These sutures will go away in one to two weeks. If the pain worsens, you should call the doctor.  Do I need to wear an eye patch?  You do not need to wear an eye patch at home after the doctor has removed the patch on your first day after surgery. However, you may be more comfortable wearing a patch outside in the sun, when sleeping or napping, or in a jerome, windy environment.  How much drainage should I have?  You may expect a moderate amount of drainage for a week. Gradually the drainage should decrease. The lids can be cleaned with a clean washcloth and gentle soap or diluted baby shampoo. Wipe the eyelids gently from the nose outward. Some blood in the tears is a normal finding.  Will there be swelling?  Some swelling is normal for about a week or two after which it will gradually decrease. Applying a cool compress, using a clean washcloth for 5 - 10 minutes several times a day may reduce the swelling and make you more comfortable. People may have some swelling of both eyes, especially if face down positioning is required. The white part of the eye may appear very red or bloody for a week or two. This may get worse a few days after surgery. Though the bright red appearance can look frightening, it is a normal finding early after surgery and will resolve in a few weeks.  Will I need to use eye drops?  You will be using several different kinds of eye drops or ointment (salve) when you  leave the hospital. The directions will be on each bottle or tube. The medication with the red top will keep your eye dilated and may make your eye more sensitive to light. Wearing sunglasses may help. The other medication is a combination antibiotic/steroid to prevent infection and promote healing.  Occasionally a third drop is used to control the pressure in your eye. A new bottle of artificial tears or lubricant ointment may be used along with your prescription eye drops after surgery. You will be using drops from four to eight weeks. Bring all eye medications (drops. ointments, or pills) with you  to each visit.   Always wash your hands before putting in the eye drops. You may wish to have someone else help you. Pull down on the lower lid and squeeze one drop from the bottle being careful not to touch the dropper to your eye or eyelid. One drop is sufficient, but another may be used if the first did not go into the eye. It is often easier to put in the drops if you are reclining or lying down. Wait five (5) minutes after the first drop before using the second drop to allow the medications to absorb into the eye.  How long will it take for my vision to improve?  Your vision should gradually improve, but it may take up to six months to regain your best vision. Frequently, air or gas bubbles are injected into the eye at the time of surgery. This will blur your vision significantly at first. As the bubble becomes smaller it will cause a black line in your vision that moves as you move your head. As the bubble becomes smaller you may notice that it looks more like a bubble or that it will break up into several smaller bubbles. It will take from a few days to a few weeks for the bubble to dissolve and be replaced by clear fluid.  You may notice floaters or double vision after your surgery. These symptoms usually will decrease with time. If the double vision is bothersome patching the eye may help.  If you notice a  sudden worsening in your vision call your doctor.  Are there any physical restrictions after surgery?  If an air or gas bubble was placed in the eye during surgery, you will be asked to spend most of your time (both awake and during the night) with your head in a specific position, frequently face down. As the eye heals and the bubble dissolves there will be less of a need for you to stay in that specific position. You should avoid sleeping on your back until the bubble has totally dissolved and you have been given permission from your surgeon. You should not fly in an airplane or go to high altitudes in the mountains while there is a bubble in your eye. If you should require any other surgery, under general anesthesia, while you still have an air bubble in your eye, have your surgeon or anesthetist contact us prior to your surgery. Some anesthetic agents can make the bubble expand and seriously damage your eye.  Patients that have a gas/air bubble placed in their eye will have a green medical alert band on their wrist.  This band should not be removed until the doctor removes it for you or gives you permission to remove it.     Heavy lifting (greater than 50 pounds), swimming and contact sports should be avoided for about 3 to 4 weeks after surgery.  You may resume your usual sexual activities about one week after surgery.  When may I return to work or my normal activities?  Depending on the type or work, you may return to work within a few days. If your work involves physical activity or driving, you will need to restrict your activities and remain home longer.  You may watch television, look at magazines, or work puzzles. Reading may be uncomfortable for several days, but using the eyes will not cause any damage.  You may go outside as usual. If conditions are windy or jerome, wear an eye pad to avoid getting dust or dirt in the eye.  Can I travel?  You cannot fly in an airplane or drive into the mountains as long  as the air or gas bubble remains in your eye.    Are there any driving restrictions?  Someone will need to drive you home from the hospital. Generally driving can be resumed in several days if you have good vision in your other eye. If you do not feel comfortable driving, do not drive! Your depth perception will be decreased so you will want to try driving during the day in light traffic until you feel comfortable driving. You should restrict your driving while you are taking prescription pain pills as they also can affect your judgment.  When can I shower and wash my hair?  You may shower or bathe when you get home, but avoid getting water in your eye. You may want someone to help you shampoo your hair at first.  You may shave, brush your teeth, or comb your hair. Do not use make-up, mascara, or creams/lotions around your eye for several weeks.  When will I see the doctor again?  Generally, you will be seen the first day after surgery and again 1-2 weeks later. If you have not received a return appointment before leaving the hospital, you should call our office during the business hours to arrange an appointment. If you will be seeing your local doctor instead of us, you will need to call that office to set up an appointment.    If you have a green armband on, your physician will instruct you as to how long you will have to wear it at your post-operative follow-up appointment.  How do I reach a doctor if I have concerns?  One of our doctors is available by calling [facility] [phone]. Please try to call for routine questions and prescription refills during business hours.  You should call your doctor if:  You notice a sudden decrease in your vision.  Have severe pain or pain increases rather than subsiding.  You notice a new black curtain over your eye that is not the gas bubble. If you have any of these symptoms, you may need to be examined.    [facility]  Home Care Following Anesthesia  For 24 hours after surgery:  1. Get plenty of rest.  A responsible adult must stay with you for at least 24 hours after you leave the surgery center.  2. Do not drive or use heavy equipment.  If you have weakness or tingling, don't drive or use heavy equipment until this feeling goes away.   3. Do not drink alcohol.   4. Avoid strenuous or risky activities.  Ask for help when climbing stairs.  5. You may feel lightheaded.  IF so, sit for a few minutes before standing.  Have someone help you get up.   6. If you have nausea (feel sick to your stomach): Drink only clear liquids such as apple juice, ginger ale, broth or 7-Up.  Rest may also help.  Be sure to drink enough fluids.  Move to a regular diet as you feel able.   7. You may have a slight fever.  Call the doctor if your fever is over 100 F (37.7 C) (taken under the tongue) or lasts longer than 24 hours.  8. You may have a dry mouth, a sore throat, muscle aches or trouble sleeping. These should go away after 24 hours.  9. Do not make important or legal decisions.               Tips for taking pain medications  To get the best pain relief possible, remember these points:    Take pain medications as directed, before pain becomes severe.    Pain medication can upset your stomach: taking it with food may help.    Constipation is a common side effect of pain medication. Drink plenty of  fluids.    Eat foods high in fiber. Take a stool softener if recommended by your doctor or pharmacist.    Do not drink alcohol, drive or operate machinery while taking pain medications.    Ask about other ways to control pain, such as with heat, ice or relaxation.    Tylenol/Acetaminophen Consumption  To help encourage the safe use of acetaminophen, the makers of TYLENOL  have lowered the maximum daily dose for single-ingredient Extra Strength TYLENOL  (acetaminophen) products sold in the [place] from 8 pills per day (4,000 mg) to 6 pills per day  (3,000 mg). The dosing interval has also changed from 2 pills every 4-6 hours to 2 pills every 6 hours.    If you feel your pain relief is insufficient, you may take Tylenol/Acetaminophen in addition to your narcotic pain medication.     Be careful not to exceed 3,000 mg of Tylenol/Acetaminophen in a 24 hour period from all sources.    If you are taking extra strength Tylenol/acetaminophen (500 mg), the maximum dose is 6 tablets in 24 hours.    If you are taking regular strength acetaminophen (325 mg), the maximum dose is 9 tablets in 24 hours.    Call a doctor for any of the followin. Signs of infection (fever, growing tenderness at the surgery site, a large amount of drainage or bleeding, severe pain, foul-smelling drainage, redness, swelling).  2. It has been over 8 to 10 hours since surgery and you are still not able to urinate (pass water).  3. Headache for over 24 hours.  4. Numbness, tingling or weakness the day after surgery (if you had spinal anesthesia).  Your doctor is:       Dr. Jennifer Teresa, Ophthalmology: [phone]               Or dial [phone] and ask for the resident on call for:  Ophthalmology  For emergency care, call the:  [facility]:  [phone] (TTY for hearing impaired: [phone])

## 2018-12-11 ENCOUNTER — OFFICE VISIT (OUTPATIENT)
Dept: OPHTHALMOLOGY | Facility: CLINIC | Age: 33
End: 2018-12-11
Attending: OPHTHALMOLOGY
Payer: COMMERCIAL

## 2018-12-11 DIAGNOSIS — Z48.810 AFTERCARE FOLLOWING SURGERY OF A SENSORY ORGAN: Primary | ICD-10-CM

## 2018-12-11 PROCEDURE — G0463 HOSPITAL OUTPT CLINIC VISIT: HCPCS | Mod: ZF

## 2018-12-11 ASSESSMENT — TONOMETRY
IOP_METHOD: ICARE
OD_IOP_MMHG: 17
OS_IOP_MMHG: 18

## 2018-12-11 ASSESSMENT — SLIT LAMP EXAM - LIDS
COMMENTS: REACTIVE PTOSIS
COMMENTS: NORMAL

## 2018-12-11 ASSESSMENT — CUP TO DISC RATIO: OS_RATIO: 0.5

## 2018-12-11 ASSESSMENT — VISUAL ACUITY
OD_CC: 20/20
METHOD: SNELLEN - LINEAR
OS_SC: HM
CORRECTION_TYPE: GLASSES

## 2018-12-11 ASSESSMENT — EXTERNAL EXAM - RIGHT EYE: OD_EXAM: NORMAL

## 2018-12-11 ASSESSMENT — EXTERNAL EXAM - LEFT EYE: OS_EXAM: NORMAL

## 2018-12-11 NOTE — PROGRESS NOTES
Postoperative day 1 status post 25G PPV/AFX/SF6 10% OS 12/10/18 for detached DSAEK graft OS    Slept OK  Retina attached  Doing well     Plan:  Position: on back  No aviation  No heavy lifting   Espinal shield at all times  Retina detachment and endophthalmitis precautions were discussed with the patient and was asked to return if any of the those occur     Medications to operative eye  Pred forte (pink top) four times a day  (already taking from Cornea)  Ofloxacin (tan top) four times a day (already taking from Cornea)  Maxitrol oint at bedtime  Atropine (red top) once a day      Follow up in one week     Evan Anderson M.D.  PGY-3, Ophthalmology    ~~~~~~~~~~~~~~~~~~~~~~~~~~~~~~~~~~  I have not examined this patient.  I have reviewed the documentation above and agree with the assessment and plan as stated above and agree with all of its relevant components.    Jennifer Teresa MD   of Ophthalmology.  Retina Service   Department of Ophthalmology and Visual Neurosciences   [facility]  Phone: [phone]   Fax: [phone]

## 2018-12-11 NOTE — PATIENT INSTRUCTIONS
Position: on back  No aviation  No heavy lifting   Espinal shield at all times  Retina detachment and endophthalmitis precautions were discussed with the patient and was asked to return if any of the those occur     Medications to operative eye  Pred forte (pink top) four times a day  (already taking from Cornea)  Ofloxacin (tan top) four times a day (already taking from Cornea)  Maxitrol oint at bedtime  Atropine (red top) once a day

## 2018-12-12 ENCOUNTER — TELEPHONE (OUTPATIENT)
Dept: OPHTHALMOLOGY | Facility: CLINIC | Age: 33
End: 2018-12-12

## 2018-12-12 NOTE — TELEPHONE ENCOUNTER
Blood on white part of eye and continued swelling post-op    Reviewed not uncommon and discussed blood on white part of eye-- not hurtful to eye, may take 1-2 weeks to absorb     Pt complaint of pain with eye movement/blinking  Concerned of suture irritation    Pt lives 3 hours away    Reviewed with dr. gardner    Pt may try aggressive lubrication for comfort and f/u next week as scheduled vs coming sooner    --  Reviewed with pt increase use of lubricating eye ointment after medicated eye drops    Pt will increase lubrication and call clinic if would like to come to clinic earlier     Pt seemed comfortable with plan of care    Note to dr. jj Lee RN 11:24 AM 12/12/18

## 2018-12-12 NOTE — TELEPHONE ENCOUNTER
[facility]    Phone Message    May a detailed message be left on voicemail: yes    Reason for Call: Symptoms or Concerns     If patient has red-flag symptoms, warm transfer to triage line    Current symptom or concern: swelling has not gone down- sharp eye pain when he opens his eye, and blood on the lower part of his eye    Symptoms have been present for:  2 day(s)    Has patient previously been seen for this? Yes    By :     Date: 12/10/2018    Are there any new or worsening symptoms? Yes: worsening      Action Taken: Message routed to:  [facility] eye clinic

## 2018-12-13 ENCOUNTER — OFFICE VISIT (OUTPATIENT)
Dept: OPHTHALMOLOGY | Facility: CLINIC | Age: 33
End: 2018-12-13
Attending: OPHTHALMOLOGY
Payer: COMMERCIAL

## 2018-12-13 DIAGNOSIS — Z48.810 AFTERCARE FOLLOWING SURGERY OF A SENSORY ORGAN: Primary | ICD-10-CM

## 2018-12-13 PROCEDURE — G0463 HOSPITAL OUTPT CLINIC VISIT: HCPCS | Mod: ZF

## 2018-12-13 PROCEDURE — 40000269 ZZH STATISTIC NO CHARGE FACILITY FEE: Mod: ZF

## 2018-12-13 RX ORDER — ATROPINE SULFATE 10 MG/ML
1-2 SOLUTION/ DROPS OPHTHALMIC DAILY
COMMUNITY
End: 2019-02-18

## 2018-12-13 ASSESSMENT — TONOMETRY
OD_IOP_MMHG: 15
IOP_METHOD: ICARE
OS_IOP_MMHG: 08

## 2018-12-13 ASSESSMENT — VISUAL ACUITY
OS_PH_CC: 20/100
OD_CC: 20/25
CORRECTION_TYPE: GLASSES
METHOD: SNELLEN - LINEAR
OS_CC: 20/150
OS_CC+: -1
OS_PH_CC+: -1

## 2018-12-13 ASSESSMENT — EXTERNAL EXAM - LEFT EYE
OS_EXAM: NORMAL
OS_EXAM: NORMAL

## 2018-12-13 ASSESSMENT — CUP TO DISC RATIO
OD_RATIO: 0.4
OS_RATIO: 0.5

## 2018-12-13 ASSESSMENT — SLIT LAMP EXAM - LIDS
COMMENTS: REACTIVE PTOSIS
COMMENTS: NORMAL
COMMENTS: REACTIVE PTOSIS
COMMENTS: NORMAL

## 2018-12-13 ASSESSMENT — EXTERNAL EXAM - RIGHT EYE
OD_EXAM: NORMAL
OD_EXAM: NORMAL

## 2018-12-13 NOTE — PROGRESS NOTES
Postoperative day 3 status post 25G PPV/AFX/SF6 10% OS 12/10/18 for detached DSAEK graft OS  Complaining of pain left eye - likely related to cornea suture from DSAEK  VA improved  Retina attached  Doing well     Plan:  No aviation  No heavy lifting   Espinal shield at all times  Retina detachment and endophthalmitis precautions were discussed with the patient and was asked to return if any of the those occur    Follow up with cornea today to consider removing cornea suture vs BCL     Medications to operative eye  Pred forte (pink top) four times a day  (already taking from Cornea)  Ofloxacin (tan top) four times a day (already taking from Cornea)  Maxitrol oint at bedtime  Atropine (red top) once a day      Follow up in one week  With retina and cornea  ~~~~~~~~~~~~~~~~~~~~~~~~~~~~~~~~~~   Complete documentation of historical and exam elements from today's encounter can be found in the full encounter summary report (not reduplicated in this progress note).  I personally obtained the chief complaint(s) and history of present illness.  I confirmed and edited as necessary the review of systems, past medical/surgical history, family history, social history, and examination findings as documented by others; and I examined the patient myself.  I personally reviewed the relevant tests, images, and reports as documented above.  I personally reviewed the ophthalmic test(s) associated with this encounter, agree with the interpretation(s) as documented by the resident/fellow, and have edited the corresponding report(s) as necessary.   I formulated and edited as necessary the assessment and plan and discussed the findings and management plan with the patient and family    Jennifer Teresa MD   of Ophthalmology.  Retina Service   Department of Ophthalmology and Visual Neurosciences   [facility]  Phone: [phone]   Fax: [phone]

## 2018-12-13 NOTE — PROGRESS NOTES
HPI: Enrique Gabriel is a 33 year old male s/p globe repair, left eye. 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 Presented with dehiscence of cornea at limbus from 9 o'clock to 3'oclock with extremely hypotonous eye, vitreous in the anterior chamber, dislocated IOL, and vitreous hemorrhage. questionable Retinal detachment. Underwent resuturing of cornea to limbus and limited peritomy without evidence of scleral laceration.     Interval history:  - Now POD3 s/p surgery with Dr. Teresa - 25G pPV/AFX/SF6 10%. Coming in for left eye pain, irritation, and discomfort with blinking. Thought to be due to an exposed suture. Vision improving left eye.     POHx;  PKP for keratoconus OU 2002  Trauma to OS with ruptured glob 2003  Repeat PKP OS 2004  Previous cataract surgery done at [facility] in [place].   Secondary IOL left eye in 2003.   H/o graft rejection left eye treated in 2011.  Ahmed tube OS 10/3/17  -s/p 25G pPV/AFX/SF6 10% left eye 12/10/18     Gtts:  Combigan BID left eye            LD this AM  Cosopt BID left eye                 LD this AM  Latanoprost at bedtime left eye LD last night  Prednisolone QID left eye   Prednisolone every other day right eye   Ofloxacin QID left eye     Assessment/Plan:     1.  s/p repair of globe rupture/corneal dehiscence left eye (1/14/17)     2. S/p DSAEK, ant vit, refixated scleral Gortex sutured IOL - left eye (11/27/18)              -graft clear centrally and superiorly               -partial graft detachment inferonasal; will keep prolene suture in place for now              -gas bubble has good coverage against the graft when patient is laying supine              -recommend flat on back positioning at least 50% of time, if not more to get good compression of gas bubble against posterior cornea and graft               -continue ofloxacin QID, pred QID              -Mike  lens placed today 22/9.0 to cover all conj sutures and prolene suture in K - for comfort               -return precautions reviewed               -f/u Wed with  as scheduled.                2. AC vitreous prolapse OS              - s/p ant vitrectomy 11/27/18     3. Traumatic Aniridia OS     4. Glaucoma, OS              s/p left Ahmed valve              Continue intraocular pressure gtts as above, patient scheduled to see Dr. Starr early may.                Steroid responder              Continue to follow-up with Dr. Starr              IOP WNL, cont above regimen     F/u Wed as scheduled   Patient seen with Dr. Lujan.    Vanessa Ferreira MD  PGY-5, Cornea Fellow  Ophthalmology    ~~~~~~~~~~~~~~~~~~~~~~~~~~~~~~~~~~~~~~~~~~~~~~~~~~~~~~~~~~~~~~~~    Complete documentation of historical and exam elements from today's encounter can be found in the full encounter summary report (not reduplicated in this progress note). I personally obtained the chief complaint(s) and history of present illness.  I confirmed and edited as necessary the review of systems, past medical/surgical history, family history, social history, and examination findings as documented by others.  I examined the patient myself, and I personally reviewed the relevant tests, images, and reports as documented above. I formulated and edited as necessary the assessment and plan and discussed the findings and management plan with the patient and family.     Mian Lujan MD, MA  Director, Cornea & Anterior Segment  [facility] Department of Ophthalmology & Visual Neuroscience

## 2018-12-19 ENCOUNTER — OFFICE VISIT (OUTPATIENT)
Dept: OPHTHALMOLOGY | Facility: CLINIC | Age: 33
End: 2018-12-19
Attending: OPHTHALMOLOGY
Payer: COMMERCIAL

## 2018-12-19 DIAGNOSIS — Z94.7 CORNEA REPLACED BY TRANSPLANT: ICD-10-CM

## 2018-12-19 DIAGNOSIS — Z48.810 AFTERCARE FOLLOWING SURGERY OF A SENSORY ORGAN: Primary | ICD-10-CM

## 2018-12-19 DIAGNOSIS — T85.328A DISLOCATED DESCEMET'S STRIPPING ENDOTHELIAL KERATOPLASTY (DSEK) GRAFT, INITIAL ENCOUNTER: ICD-10-CM

## 2018-12-19 PROCEDURE — G0463 HOSPITAL OUTPT CLINIC VISIT: HCPCS | Mod: ZF

## 2018-12-19 PROCEDURE — 40000269 ZZH STATISTIC NO CHARGE FACILITY FEE: Mod: ZF

## 2018-12-19 RX ORDER — SILICONE ADHESIVE 1.5" X 3"
1 SHEET (EA) TOPICAL 4 TIMES DAILY
Qty: 1 BOTTLE | Refills: 1 | Status: SHIPPED | OUTPATIENT
Start: 2018-12-19 | End: 2019-09-25

## 2018-12-19 ASSESSMENT — REFRACTION_WEARINGRX
OS_AXIS: 123
OD_SPHERE: -6.50
OS_AXIS: 123
OS_SPHERE: -0.25
OD_AXIS: 125
OS_SPHERE: -0.25
OD_AXIS: 125
OS_CYLINDER: +2.25
OS_CYLINDER: +2.25
OD_CYLINDER: +3.25
OD_CYLINDER: +3.25
OD_SPHERE: -6.50

## 2018-12-19 ASSESSMENT — VISUAL ACUITY
OS_SC: 20/200
OS_PH_SC: 20/125
METHOD: SNELLEN - LINEAR
CORRECTION_TYPE: GLASSES
OS_PH_SC: 20/125
METHOD: SNELLEN - LINEAR
OD_CC: 20/20
OS_SC: 20/200
CORRECTION_TYPE: GLASSES
OD_CC: 20/20

## 2018-12-19 ASSESSMENT — TONOMETRY
OD_IOP_MMHG: 12
IOP_METHOD: ICARE
OD_IOP_MMHG: 12
IOP_METHOD: ICARE
OS_IOP_MMHG: 24
OS_IOP_MMHG: 24

## 2018-12-19 ASSESSMENT — CUP TO DISC RATIO: OS_RATIO: 0.5

## 2018-12-19 ASSESSMENT — EXTERNAL EXAM - LEFT EYE
OS_EXAM: NORMAL
OS_EXAM: NORMAL

## 2018-12-19 ASSESSMENT — EXTERNAL EXAM - RIGHT EYE
OD_EXAM: NORMAL
OD_EXAM: NORMAL

## 2018-12-19 ASSESSMENT — SLIT LAMP EXAM - LIDS
COMMENTS: REACTIVE PTOSIS
COMMENTS: REACTIVE PTOSIS
COMMENTS: NORMAL
COMMENTS: NORMAL

## 2018-12-19 NOTE — PROGRESS NOTES
HPI: Enrique Gabriel is a 33 year old male s/p globe repair, left eye. 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 Presented with dehiscence of cornea at limbus from 9 o'clock to 3'oclock with extremely hypotonous eye, vitreous in the anterior chamber, dislocated IOL, and vitreous hemorrhage. questionable Retinal detachment. Underwent resuturing of cornea to limbus and limited peritomy without evidence of scleral laceration.     Interval history:  - Still having some discomfort but no pain. Vision seems slightly improved.    POHx;  PKP for keratoconus OU 2002  Trauma to OS with ruptured glob 2003  Repeat PKP OS 2004  Previous cataract surgery done at [facility] in [place].   Secondary IOL left eye in 2003.   H/o graft rejection left eye treated in 2011.  Ahmed tube OS 10/3/17  -s/p 25G pPV/AFX/SF6 10% left eye 12/10/18     Gtts:  Brimonidine BID left eye            LD this AM  Cosopt BID left eye                 LD this AM  Latanoprost at bedtime left eye LD last night  Prednisolone QID left eye   Prednisolone every other day right eye   Ofloxacin QID left eye     Assessment/Plan:     1.  s/p repair of globe rupture/corneal dehiscence left eye (1/14/17)     2. S/p DSAEK, ant vit, refixated scleral Gortex sutured IOL - left eye (11/27/18)              -graft clear centrally and superiorly               -partial graft detachment inferonasal but improved; will keep prolene suture in place for now              -gas bubble nearly gone               -restart ofloxacin QID x 3 days                 -cont pred QID OS               -remove prolene suture               -D/C Kontour lens               - add ashutosh qid os               -return precautions reviewed                2. AC vitreous prolapse OS              - s/p ant vitrectomy 11/27/18     3. Traumatic Aniridia OS     4. Glaucoma, OS              s/p left Ahmed valve               Continue intraocular pressure gtts as above, patient scheduled to see Dr. Starr early may.                Steroid responder              Continue to follow-up with Dr. Starr              Stop Cosopt              Continue brimonidine BID OS               Continue latanoprost at bedtime OS      Ioana Grace MD  PGY-5, Cornea Fellow    Attending Physician Attestation:  Complete documentation of historical and exam elements from today's encounter can be found in the full encounter summary report (not reduplicated in this progress note).  I personally obtained the chief complaint(s) and history of present illness.  I confirmed and edited as necessary the review of systems, past medical/surgical history, family history, social history, and examination findings as documented by others; and I examined the patient myself.  I personally reviewed the relevant tests, images, and reports as documented above.  I formulated and edited as necessary the assessment and plan and discussed the findings and management plan with the patient and family. - Jose G Hou MD

## 2018-12-19 NOTE — PROGRESS NOTES
Postoperative day 3 status post 25G PPV/AFX/SF6 10% OS 12/10/18 for detached DSAEK graft OS  Complaining of pain left eye - likely related to cornea suture from DSAEK  VA improved  Retina attached  Doing well     Plan:  No aviation  No heavy lifting   Espinal shield at all times  Retina detachment and endophthalmitis precautions were discussed with the patient and was asked to return if any of the those occur     Medications to operative eye  Stop atropine  continue ofloxacin / PF and ashutosh per       Follow up in retina / cornea 1/7/19    Pradeep [place] MD, PhD  Vitreoretinal Surgery Fellow    ~~~~~~~~~~~~~~~~~~~~~~~~~~~~~~~~~~   Complete documentation of historical and exam elements from today's encounter can be found in the full encounter summary report (not reduplicated in this progress note).  I personally obtained the chief complaint(s) and history of present illness.  I confirmed and edited as necessary the review of systems, past medical/surgical history, family history, social history, and examination findings as documented by others; and I examined the patient myself.  I personally reviewed the relevant tests, images, and reports as documented above.  I personally reviewed the ophthalmic test(s) associated with this encounter, agree with the interpretation(s) as documented by the resident/fellow, and have edited the corresponding report(s) as necessary.   I formulated and edited as necessary the assessment and plan and discussed the findings and management plan with the patient and family    Jennifer Teresa MD   of Ophthalmology.  Retina Service   Department of Ophthalmology and Visual Neurosciences   [facility]  Phone: [phone]   Fax: [phone]

## 2018-12-25 LAB
FUNGUS SPEC CULT: NORMAL
SPECIMEN SOURCE: NORMAL

## 2019-01-07 ENCOUNTER — OFFICE VISIT (OUTPATIENT)
Dept: OPHTHALMOLOGY | Facility: CLINIC | Age: 34
End: 2019-01-07
Attending: OPHTHALMOLOGY
Payer: COMMERCIAL

## 2019-01-07 DIAGNOSIS — T85.328A DISLOCATED DESCEMET'S STRIPPING ENDOTHELIAL KERATOPLASTY (DSEK) GRAFT, INITIAL ENCOUNTER: Primary | ICD-10-CM

## 2019-01-07 DIAGNOSIS — Z94.7 CORNEA REPLACED BY TRANSPLANT: ICD-10-CM

## 2019-01-07 DIAGNOSIS — H40.32X0: ICD-10-CM

## 2019-01-07 DIAGNOSIS — H18.20 CORNEAL EDEMA OF LEFT EYE: ICD-10-CM

## 2019-01-07 PROCEDURE — G0463 HOSPITAL OUTPT CLINIC VISIT: HCPCS | Mod: ZF

## 2019-01-07 ASSESSMENT — VISUAL ACUITY
OS_PH_CC+: -2
OD_CC: 20/20
METHOD: SNELLEN - LINEAR
OS_CC: 20/100
OS_PH_CC: 20/60
CORRECTION_TYPE: GLASSES

## 2019-01-07 ASSESSMENT — TONOMETRY
OD_IOP_MMHG: 13
OS_IOP_MMHG: 19
IOP_METHOD: ICARE

## 2019-01-07 ASSESSMENT — SLIT LAMP EXAM - LIDS
COMMENTS: REACTIVE PTOSIS
COMMENTS: NORMAL

## 2019-01-07 ASSESSMENT — PACHYMETRY: OS_CT(UM): 795

## 2019-01-07 ASSESSMENT — EXTERNAL EXAM - LEFT EYE: OS_EXAM: NORMAL

## 2019-01-07 ASSESSMENT — EXTERNAL EXAM - RIGHT EYE: OD_EXAM: NORMAL

## 2019-01-07 NOTE — NURSING NOTE
Patient presents for 1 mo f/u s/p DSEAK LE. Since the last visit the vision has improved. The eyes feel itchy and dry. No pain or discomfort, no redness or tears. No flashes or floaters. Compliant with all rx eye drops. Wearing corrective lenses. Thuy LOPEZ 8:03 AM January 7, 2019

## 2019-01-07 NOTE — PROGRESS NOTES
HPI: Enrique Gabriel is a 33 year old male s/p globe repair, left eye. 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 Presented with dehiscence of cornea at limbus from 9 o'clock to 3'oclock with extremely hypotonous eye, vitreous in the anterior chamber, dislocated IOL, and vitreous hemorrhage. questionable Retinal detachment. Underwent resuturing of cornea to limbus and limited peritomy without evidence of scleral laceration.     Interval history:  - States vision improved. Compliant with gtts. C/o occasional FBS/dryness.    POHx;  PKP for keratoconus OU 2002  Trauma to OS with ruptured glob 2003  Repeat PKP OS 2004  Previous cataract surgery done at [facility] in [place].   Secondary IOL left eye in 2003.   H/o graft rejection left eye treated in 2011.  Ahmed tube OS 10/3/17  -s/p 25G pPV/AFX/SF6 10% left eye 12/10/18     Gtts:  Brimonidine BID left eye            LD this AM           Latanoprost at bedtime left eye LD last night  Prednisolone QID left eye   Prednisolone every other day right eye     Ashutosh qid left eye  PFATs prn 3-4 times a day     Assessment/Plan:     1.  s/p repair of globe rupture/corneal dehiscence left eye (1/14/17)     2. S/p DSAEK, ant vit, refixated scleral Gortex sutured IOL - left eye (11/27/18)              -graft clear centrally and superiorly              -increase PFATs to q1-2h   -few bullae likely contributing to FBS but patient does not want BCL, increase PFATs               -cont ashutosh qid os               -return precautions reviewed                2. AC vitreous prolapse OS              - s/p ant vitrectomy 11/27/18     3. Traumatic Aniridia OS     4. Glaucoma, OS              s/p left Ahmed valve              Continue intraocular pressure gtts as above, patient scheduled to see Dr. Starr early may.                Steroid responder              Continue to follow-up with   Starr              Cosopt stopped last visit, IOP ok              Continue brimonidine BID OS   Continue latanoprost at bedtime OS      Ioana Grace MD  PGY-5, Cornea Fellow    Attending Physician Attestation:  Complete documentation of historical and exam elements from today's encounter can be found in the full encounter summary report (not reduplicated in this progress note).  I personally obtained the chief complaint(s) and history of present illness.  I confirmed and edited as necessary the review of systems, past medical/surgical history, family history, social history, and examination findings as documented by others; and I examined the patient myself.  I personally reviewed the relevant tests, images, and reports as documented above.  I formulated and edited as necessary the assessment and plan and discussed the findings and management plan with the patient and family. - Jose G Hou MD    --------------------------------------------------------------------------------------------------------------------------------------------  Pachymetry - Interpretation & Report  Indication: Post corneal transplant OS  Performed by: Jose G Hou MD  Reliability: good  Patient cooperation: good  Findings:   Left eye:  795 micrometers centrally  Interval Change, Assessment, & Impact on treatment:   Left eye:  Improved K edema OS   Signed: Jose G Hou 1/7/2019 9:08 AM

## 2019-02-15 ENCOUNTER — TELEPHONE (OUTPATIENT)
Dept: OPHTHALMOLOGY | Facility: CLINIC | Age: 34
End: 2019-02-15

## 2019-02-15 DIAGNOSIS — Z94.7 HISTORY OF CORNEA TRANSPLANT: ICD-10-CM

## 2019-02-15 DIAGNOSIS — H18.20 CORNEAL EDEMA OF LEFT EYE: ICD-10-CM

## 2019-02-15 DIAGNOSIS — T86.8409 REJECTION OF CORNEA TRANSPLANT: ICD-10-CM

## 2019-02-15 RX ORDER — PREDNISOLONE ACETATE 10 MG/ML
SUSPENSION/ DROPS OPHTHALMIC
Qty: 5 ML | Refills: 5 | Status: SHIPPED | OUTPATIENT
Start: 2019-02-15 | End: 2019-02-20

## 2019-02-15 RX ORDER — PREDNISOLONE ACETATE 10 MG/ML
SUSPENSION/ DROPS OPHTHALMIC
Qty: 5 ML | Refills: 3 | OUTPATIENT
Start: 2019-02-15

## 2019-02-15 NOTE — TELEPHONE ENCOUNTER
Pt calling triage line at 1700 for refill request of eye drops    Rx sent to pt pharmacy at 1700  Mario Lee RN 5:01 PM 02/15/19

## 2019-02-15 NOTE — TELEPHONE ENCOUNTER
Medication: prednisoLONE acetate (PRED FORTE) 1 % ophthalmic suspension    Requested directions: INSTILL 1 DROP INTO LEFT EYE EVERY DAY  Current directions on the medication list: Place 1 drop Into the left eye daily - Left Eye    Last Written Prescription Date:  6-11-18  Last Fill Quantity: 1 bottle,   # refills: 3    Last Office Visit: 1-7-19  Future Office visit: 2-18-19    Attending Provider: Ameya Chan Clinic Note:   Gtts:  Brimonidine BID left eye            LD this AM           Latanoprost at bedtime left eye LD last night  Prednisolone QID left eye   Prednisolone every other day right eye     Ashutosh qid left eye  PFATs prn 3-4 times a day     Assessment/Plan:     1.  s/p repair of globe rupture/corneal dehiscence left eye (1/14/17)     2. S/p DSAEK, ant vit, refixated scleral Gortex sutured IOL - left eye (11/27/18)              -graft clear centrally and superiorly              -increase PFATs to q1-2h              -few bullae likely contributing to FBS but patient does not want BCL, increase PFATs               -cont ashutosh qid os               -return precautions reviewed     2. AC vitreous prolapse OS              - s/p ant vitrectomy 11/27/18     3. Traumatic Aniridia OS     4. Glaucoma, OS              s/p left Ahmed valve              Continue intraocular pressure gtts as above, patient scheduled to see Dr. Starr early may.                Steroid responder              Continue to follow-up with Dr. Starr              Cosopt stopped last visit, IOP ok              Continue brimonidine BID OS              Continue latanoprost at bedtime OS        Routing refill request to provider for review/approval because:  Requires provider review  Inconsistent dose/directions    Also on med list Rx Predforte; 11-26-18 for 5 ml with 0 RF, 1 drop into the left eye 4 times daily

## 2019-02-18 ENCOUNTER — OFFICE VISIT (OUTPATIENT)
Dept: OPHTHALMOLOGY | Facility: CLINIC | Age: 34
End: 2019-02-18
Attending: OPHTHALMOLOGY
Payer: COMMERCIAL

## 2019-02-18 DIAGNOSIS — Z94.7 CORNEA REPLACED BY TRANSPLANT: ICD-10-CM

## 2019-02-18 DIAGNOSIS — Z94.7 CORNEA REPLACED BY TRANSPLANT: Primary | ICD-10-CM

## 2019-02-18 DIAGNOSIS — H40.62X0 STEROID INDUCED GLAUCOMA, LEFT EYE: Primary | ICD-10-CM

## 2019-02-18 DIAGNOSIS — T38.0X5A STEROID INDUCED GLAUCOMA, LEFT EYE: Primary | ICD-10-CM

## 2019-02-18 PROCEDURE — G0463 HOSPITAL OUTPT CLINIC VISIT: HCPCS | Mod: ZF

## 2019-02-18 PROCEDURE — 76514 ECHO EXAM OF EYE THICKNESS: CPT | Mod: ZF | Performed by: OPHTHALMOLOGY

## 2019-02-18 ASSESSMENT — CONF VISUAL FIELD
OD_NORMAL: 1
OS_SUPERIOR_NASAL_RESTRICTION: 3
METHOD: COUNTING FINGERS

## 2019-02-18 ASSESSMENT — VISUAL ACUITY
METHOD: SNELLEN - LINEAR
OS_CC: 20/150
OS_PH_SC+: +2
OD_CC+: +2
OS_PH_CC: 20/70
CORRECTION_TYPE: GLASSES
OS_SC: 20/200
OS_PH_SC: 20/60
OD_CC: 20/25

## 2019-02-18 ASSESSMENT — REFRACTION_WEARINGRX
OS_AXIS: 123
OS_CYLINDER: +2.25
OS_SPHERE: -0.25
OD_SPHERE: -6.50
OD_CYLINDER: +3.25
OD_AXIS: 125

## 2019-02-18 ASSESSMENT — TONOMETRY
IOP_METHOD: TONOPEN
OS_IOP_MMHG: 34
OD_IOP_MMHG: 15
OS_IOP_MMHG: 33
IOP_METHOD: ICARE
OD_IOP_MMHG: --

## 2019-02-18 ASSESSMENT — SLIT LAMP EXAM - LIDS
COMMENTS: REACTIVE PTOSIS
COMMENTS: NORMAL

## 2019-02-18 ASSESSMENT — PACHYMETRY: OS_CT(UM): 793

## 2019-02-18 ASSESSMENT — EXTERNAL EXAM - LEFT EYE: OS_EXAM: NORMAL

## 2019-02-18 ASSESSMENT — EXTERNAL EXAM - RIGHT EYE: OD_EXAM: NORMAL

## 2019-02-18 NOTE — PROGRESS NOTES
HPI: Enrique Gabriel is a 33 year old male s/p globe repair, left eye. 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 Presented with dehiscence of cornea at limbus from 9 o'clock to 3'oclock with extremely hypotonous eye, vitreous in the anterior chamber, dislocated IOL, and vitreous hemorrhage. questionable Retinal detachment. Underwent resuturing of cornea to limbus and limited peritomy without evidence of scleral laceration.     Interval history:  States vision stable. Compliant with gtts. C/o occasional FBS/dryness.    POHx;  PKP for keratoconus OU 2002  Trauma to OS with ruptured globe 2003  Repeat PKP OS 2004  Previous cataract surgery done at [facility] in [place].   Secondary IOL left eye in 2003.   H/o graft rejection left eye treated in 2011.  Ahmed tube OS 10/3/17  -s/p 25G pPV/AFX/SF6 10% left eye 12/10/18     Gtts:  Brimonidine BID left eye           Latanoprost at bedtime left eye  Prednisolone QID left eye   Prednisolone every other day right eye   Ashutosh qid left eye  PFATs prn 3-4 times a day     Assessment/Plan:     1.  s/p repair of globe rupture/corneal dehiscence left eye (1/14/17)     2. S/p DSAEK, ant vit, refixated scleral Gortex sutured IOL - left eye (11/27/18)              - graft clear centrally and superiorly               - Pachy 793 <-- 795 <-- 886 <-- 913   - increase PFATs to q1-2h              - Decrease Pred Forte TID OS 1 week then BID until next visit              - cont ashutosh qid os              - return precautions reviewed                2. AC vitreous prolapse OS              - s/p ant vitrectomy 11/27/18     3. Traumatic Aniridia OS     4. Glaucoma, OS              s/p left Ahmed valve              Continue intraocular pressure gtts as above, patient scheduled to see Dr. Starr early may.                Steroid responder              Continue to follow-up with Dr. Starr               Restart Cosopt BID OS              Continue brimonidine BID OS   Continue latanoprost at bedtime OS       Return 1 month IOP check near home (or here)  Return 2 months, same day Dr. Starr 4/1/19     Evan Anderson M.D.  PGY-3, Ophthalmology      Attending Physician Attestation:  Complete documentation of historical and exam elements from today's encounter can be found in the full encounter summary report (not reduplicated in this progress note).  I personally obtained the chief complaint(s) and history of present illness.  I confirmed and edited as necessary the review of systems, past medical/surgical history, family history, social history, and examination findings as documented by others; and I examined the patient myself.  I personally reviewed the relevant tests, images, and reports as documented above.  I formulated and edited as necessary the assessment and plan and discussed the findings and management plan with the patient and family. - Jose G Hou MD    --------------------------------------------------------------------------------------------------------------------------------------------  Pachymetry - Interpretation & Report  Indication: post corneal transplant, K edema OS  Performed by: Jose G Hou MD  Reliability: good  Patient cooperation: good  Findings:   Left eye:  793 micrometers centrally   Interval Change, Assessment, & Impact on treatment:   Left eye:  Stable, mild persistent K edema   Signed: Jose G Hou 2/19/2019 5:08 PM

## 2019-02-18 NOTE — PATIENT INSTRUCTIONS
Decrease Pred Forte to 3x/day left eye 1 week then 2x/day until next visit    Restart Cosopt 2x/day left eye  Continue Alphagan 3x/day  Continue Latanoprost at night    Continue Alexa 4x/day left eye  Continue artificial tears

## 2019-02-20 RX ORDER — PREDNISOLONE ACETATE 10 MG/ML
SUSPENSION/ DROPS OPHTHALMIC
Qty: 5 ML | Refills: 5 | Status: SHIPPED | OUTPATIENT
Start: 2019-02-20 | End: 2019-04-01

## 2019-04-01 ENCOUNTER — OFFICE VISIT (OUTPATIENT)
Dept: OPHTHALMOLOGY | Facility: CLINIC | Age: 34
End: 2019-04-01
Attending: OPHTHALMOLOGY
Payer: COMMERCIAL

## 2019-04-01 DIAGNOSIS — T38.0X5A STEROID INDUCED GLAUCOMA, LEFT EYE: Primary | ICD-10-CM

## 2019-04-01 DIAGNOSIS — Z94.7 CORNEA REPLACED BY TRANSPLANT: ICD-10-CM

## 2019-04-01 DIAGNOSIS — H18.20 CORNEAL EDEMA OF LEFT EYE: ICD-10-CM

## 2019-04-01 DIAGNOSIS — H40.62X0 STEROID INDUCED GLAUCOMA, LEFT EYE: Primary | ICD-10-CM

## 2019-04-01 DIAGNOSIS — Z94.7 HISTORY OF CORNEA TRANSPLANT: ICD-10-CM

## 2019-04-01 DIAGNOSIS — T86.8409 REJECTION OF CORNEA TRANSPLANT: ICD-10-CM

## 2019-04-01 DIAGNOSIS — T85.328A DISLOCATED DESCEMET'S STRIPPING ENDOTHELIAL KERATOPLASTY (DSEK) GRAFT, INITIAL ENCOUNTER: ICD-10-CM

## 2019-04-01 PROCEDURE — 92083 EXTENDED VISUAL FIELD XM: CPT | Mod: ZF | Performed by: OPHTHALMOLOGY

## 2019-04-01 PROCEDURE — G0463 HOSPITAL OUTPT CLINIC VISIT: HCPCS | Mod: ZF

## 2019-04-01 PROCEDURE — 76514 ECHO EXAM OF EYE THICKNESS: CPT | Mod: ZF | Performed by: OPHTHALMOLOGY

## 2019-04-01 PROCEDURE — 40000269 ZZH STATISTIC NO CHARGE FACILITY FEE: Mod: ZF

## 2019-04-01 RX ORDER — OFLOXACIN 3 MG/ML
1 SOLUTION/ DROPS OPHTHALMIC 4 TIMES DAILY
Qty: 1 BOTTLE | Refills: 11 | Status: SHIPPED | OUTPATIENT
Start: 2019-04-01 | End: 2019-05-06

## 2019-04-01 RX ORDER — FLUOROMETHOLONE 0.1 %
1 SUSPENSION, DROPS(FINAL DOSAGE FORM)(ML) OPHTHALMIC (EYE) 2 TIMES DAILY
Qty: 1 BOTTLE | Refills: 0 | Status: SHIPPED | OUTPATIENT
Start: 2019-04-01 | End: 2019-05-06

## 2019-04-01 ASSESSMENT — VISUAL ACUITY
CORRECTION_TYPE: GLASSES
OS_SC: 20/100
OS_SC: 20/100
OS_SC+: -1
OD_CC: 20/25
OD_CC+: -2
OS_PH_SC: 20/60
OS_PH_SC: 20/60
METHOD: SNELLEN - LINEAR
OS_PH_SC+: +1
METHOD: SNELLEN - LINEAR
OS_PH_SC+: +1
OD_CC: 20/25
OS_SC+: -1
OD_CC+: -2

## 2019-04-01 ASSESSMENT — SLIT LAMP EXAM - LIDS
COMMENTS: REACTIVE PTOSIS
COMMENTS: REACTIVE PTOSIS
COMMENTS: NORMAL
COMMENTS: NORMAL

## 2019-04-01 ASSESSMENT — TONOMETRY
IOP_METHOD: APPLANATION
OS_IOP_MMHG: 24
OD_IOP_MMHG: 18
OS_IOP_MMHG: 24
OD_IOP_MMHG: 18
IOP_METHOD: APPLANATION

## 2019-04-01 ASSESSMENT — CUP TO DISC RATIO
OS_RATIO: 0.5
OD_RATIO: 0.4

## 2019-04-01 ASSESSMENT — CONF VISUAL FIELD
METHOD: COUNTING FINGERS
OS_NORMAL: 1
OS_NORMAL: 1
METHOD: COUNTING FINGERS
OD_NORMAL: 1
OD_NORMAL: 1

## 2019-04-01 ASSESSMENT — PACHYMETRY
OS_CT(UM): 802
OD_CT(UM): 616

## 2019-04-01 ASSESSMENT — EXTERNAL EXAM - LEFT EYE
OS_EXAM: NORMAL
OS_EXAM: NORMAL

## 2019-04-01 ASSESSMENT — EXTERNAL EXAM - RIGHT EYE
OD_EXAM: NORMAL
OD_EXAM: NORMAL

## 2019-04-01 NOTE — NURSING NOTE
Chief Complaints and History of Present Illnesses   Patient presents with     Post Op (Ophthalmology) Left Eye     Chief Complaint(s) and History of Present Illness(es)     Post Op (Ophthalmology) Left Eye     Laterality: left eye    Quality: blurred vision    Frequency: constantly    Course: stable    Associated symptoms: Negative for eye pain, dryness, redness and tearing    Treatments tried: artificial tears    Response to treatment: significant improvement    Pain scale: 0/10              Comments     Pred Forte BID to LE  /  Every other day to RE  Cosopt BID to LE  Brimonidine BID to LE  Latanoprost at bedtime to LE  Alexa 128 4 times a day to LE.  Denies use of art tears as says that they helped resolve eye pain.  States VA 'worse in the morning for a few hours'.  Seeing  today.  Keri LOPEZ 9:51 AM 04/01/2019

## 2019-04-01 NOTE — PROGRESS NOTES
CC: Ahmed tube with corneal patch graft LEFT eye 10/3/17, history of steroid response    Interval history:   Mild left eye soreness. Vision stable.     HPI: High intraocular pressure following globe repair and steroids for rejection, left eye. 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 Presented with dehiscence of cornea at limbus from 9 o'clock to 3'oclock with extremely hypotonous eye, vitreous in the anterior chamber, dislocated IOL, and vitreous hemorrhage. Underwent resuturing of cornea to limbus and limited peritomy without evidence of scleral laceration.    Past ocular history    PKP for keratoconus OU 2002  Trauma to OS with ruptured globe 2003  Repeat PKP OS 2004  Previous cataract surgery done at [facility] in [place].   Secondary IOL left eye in 2003.   H/o graft rejection left eye treated in 2011.  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 Joseph in past, however, he  does not want to wear CL at this point. Still functioning with glasses  May  require repeat PKP after resolution of rejection +/- reposition of IOL  following with      AC vitreous prolapse OS  s/p ant vitrectomy 11/27/18  S/p post 25G PPV/AFX/SF6 10% OS 12/10/18 for detached DSAEK graft OS    Plan:  Continue Cosopt BID OS  Continue brimonidine BID OS  Continue latanoprost at bedtime OS      Return to clinic: 6 months IOP check and retinal nerve fiber layer OCT    Giovany Goetz MD  Ophthalmology Resident, PGY-3  [facility]

## 2019-04-01 NOTE — NURSING NOTE
Chief Complaints and History of Present Illnesses   Patient presents with     Post Op (Ophthalmology) Left Eye     Chief Complaint(s) and History of Present Illness(es)     Post Op (Ophthalmology) Left Eye     Laterality: left eye    Quality: blurred vision    Frequency: constantly    Timing: in the morning and throughout the day (Worse in morning for about 2 hours, per pt.)    Course: stable    Associated symptoms: Negative for dryness, eye pain, redness and tearing    Treatments tried: eye drops and artificial tears    Pain scale: 0/10              Comments     Pred Forte BID to LE  /  Every other day to RE   Cosopt BID to LE   Brimonidine BID to LE   Latanoprost at bedtime to LE   Alexa 128 4 times a day to LE.   Denies use of art tears as says that they helped resolve eye pain.   States VA 'worse in the morning for a few hours'.   Seeing Dr. Starr today.   Keri LOPEZ 9:51 AM 04/01/2019

## 2019-04-01 NOTE — PROGRESS NOTES
CC: s/p DSAEK OS    HPI: Enrique Gabriel is a 33 year old male s/p globe repair, left eye. 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 Presented with dehiscence of cornea at limbus from 9 o'clock to 3'oclock with extremely hypotonous eye, vitreous in the anterior chamber, dislocated IOL, and vitreous hemorrhage. questionable Retinal detachment. Underwent resuturing of cornea to limbus and limited peritomy without evidence of scleral laceration.     Interval history:  Vision improving OS. Followed up with local optometrist for IOP check and his IOP was 19. States he has come cloudiness in the AM OS. Patient states less photophobia, less redness, tearing.    POHx;  PKP for keratoconus OU 2002  Trauma to OS with ruptured globe 2003  Repeat PKP OS 2004  Previous cataract surgery done at [facility] in [place].   Secondary IOL left eye in 2003.   H/o graft rejection left eye treated in 2011.  Ahmed tube OS 10/3/17  -s/p 25G pPV/AFX/SF6 10% left eye 12/10/18     Gtts:  Brimonidine BID left eye   Cosopt BID left eye        Latanoprost at bedtime left eye  Prednisolone BID  left eye   Prednisolone every other day right eye   Ashutosh qid left eye  PFATs prn 3-4 times a day     Assessment/Plan:     1.  s/p repair of globe rupture/corneal dehiscence left eye (1/14/17)     2. S/p DSAEK, ant vit, refixated scleral Gortex sutured IOL - left eye (11/27/18)              - graft clear centrally and superiorly               - Pachy 802 <-- 793 <-- 795 <-- 886 <-- 913                - increase PFATs to q1-2h   - Two loose sutures removed today; Ofloxacin QID OS for 4 days              - Stop pred and switch to FML BID OS given elevated IOP              - cont ashutosh qid OS   - Will wait for stable/decreased IOP until obtain MRx as cornea swelling likely partially attributable to high IOP              - return precautions reviewed                 2. AC vitreous prolapse OS              - s/p ant vitrectomy 11/27/18     3. Traumatic Aniridia OS     4. Glaucoma, OS              s/p left Ahmed valve              Continue intraocular pressure gtts as above, patient scheduled to see Dr. Starr early may.                Steroid responder; Cosopt restarted early 3/2019 for increased IOP (mid 30s)              Continue to follow-up with Dr. Starr              Continue Cosopt BID OS              Continue brimonidine BID OS   Continue latanoprost at bedtime OS       Return 1 month       Evan Andesron M.D.  PGY-3, Ophthalmology    Attending Physician Attestation:  Complete documentation of historical and exam elements from today's encounter can be found in the full encounter summary report (not reduplicated in this progress note).  I personally obtained the chief complaint(s) and history of present illness.  I confirmed and edited as necessary the review of systems, past medical/surgical history, family history, social history, and examination findings as documented by others; and I examined the patient myself.  I personally reviewed the relevant tests, images, and reports as documented above.  I formulated and edited as necessary the assessment and plan and discussed the findings and management plan with the patient and family. - Jose G Hou MD      --------------------------------------------------------------------------------------------------------------------------------------------  Pachymetry - Interpretation & Report  Indication: Post corneal transplant, corneal edema OS  Performed by: Jose G Hou MD  Reliability: good  Patient cooperation: good  Findings:   Right eye:  616 micrometers centrally    Left eye:  802 micrometers centrally   Interval Change, Assessment, & Impact on treatment:   Right eye:  Baseline, mildly thick   Left eye:  Thickened K with mild edema, but similar to previously.   Signed: Jose G Hou 4/1/2019 12:53 PM

## 2019-04-01 NOTE — NURSING NOTE
Chief Complaints and History of Present Illnesses   Patient presents with     Post Op (Ophthalmology) Left Eye     Chief Complaint(s) and History of Present Illness(es)     Post Op (Ophthalmology) Left Eye     Laterality: left eye    Quality: blurred vision    Frequency: constantly    Course: stable    Associated symptoms: Negative for eye pain, dryness, redness and tearing    Treatments tried: artificial tears    Response to treatment: significant improvement    Pain scale: 0/10              Comments     Chief Complaints and History of Present Illnesses   Patient presents with     Post Op (Ophthalmology) Left Eye     Chief Complaint(s) and History of Present Illness(es)     Post Op (Ophthalmology) Left Eye     Laterality: left eye    Quality: blurred vision    Frequency: constantly    Course: stable    Associated symptoms: Negative for eye pain, dryness, redness and tearing    Treatments tried: artificial tears    Response to treatment: significant improvement    Pain scale: 0/10              Comments     Chief Complaints and History of Present Illnesses   Patient presents with     Post Op (Ophthalmology) Left Eye     Chief Complaint(s) and History of Present Illness(es)     Post Op (Ophthalmology) Left Eye     Laterality: left eye    Quality: blurred vision    Frequency: constantly    Course: stable    Associated symptoms: Negative for eye pain, dryness, redness and tearing    Treatments tried: artificial tears    Response to treatment: significant improvement    Pain scale: 0/10              Comments     Pred Forte BID to LE  /  Every other day to RE  Cosopt BID to LE  Brimonidine BID to LE  Latanoprost at bedtime to LE  Alexa 128 4 times a day to LE.  Denies use of art tears as says that they helped resolve eye pain.  States [facility] 'worse in the morning for a few hours'.  Seeing Dr. Starr today.  Keri Huber COT 9:51 AM 04/01/2019

## 2019-05-06 ENCOUNTER — OFFICE VISIT (OUTPATIENT)
Dept: OPHTHALMOLOGY | Facility: CLINIC | Age: 34
End: 2019-05-06
Attending: OPHTHALMOLOGY
Payer: COMMERCIAL

## 2019-05-06 DIAGNOSIS — Z94.7 CORNEA REPLACED BY TRANSPLANT: Primary | ICD-10-CM

## 2019-05-06 DIAGNOSIS — H40.62X0 STEROID INDUCED GLAUCOMA, LEFT EYE: ICD-10-CM

## 2019-05-06 DIAGNOSIS — Z94.7 CORNEA REPLACED BY TRANSPLANT: ICD-10-CM

## 2019-05-06 DIAGNOSIS — T38.0X5A STEROID INDUCED GLAUCOMA, LEFT EYE: ICD-10-CM

## 2019-05-06 PROCEDURE — G0463 HOSPITAL OUTPT CLINIC VISIT: HCPCS | Mod: ZF

## 2019-05-06 PROCEDURE — 76514 ECHO EXAM OF EYE THICKNESS: CPT | Mod: ZF | Performed by: OPHTHALMOLOGY

## 2019-05-06 RX ORDER — FLUOROMETHOLONE 0.1 %
1 SUSPENSION, DROPS(FINAL DOSAGE FORM)(ML) OPHTHALMIC (EYE) 2 TIMES DAILY
Qty: 1 BOTTLE | Refills: 11 | Status: SHIPPED | OUTPATIENT
Start: 2019-05-06 | End: 2020-06-03

## 2019-05-06 ASSESSMENT — PACHYMETRY
OS_CT(UM): 846
OD_CT(UM): 579

## 2019-05-06 ASSESSMENT — VISUAL ACUITY
CORRECTION_TYPE: GLASSES
OS_CC: 20/80
OS_CC+: -1
OD_CC: 20/25
METHOD: SNELLEN - LINEAR
OD_CC+: -2
OS_PH_CC: 20/60
OS_PH_CC+: -2

## 2019-05-06 ASSESSMENT — REFRACTION_WEARINGRX
OD_CYLINDER: +3.25
OS_CYLINDER: +2.25
OS_SPHERE: -0.25
OS_AXIS: 123
OD_SPHERE: -6.50
OD_AXIS: 125

## 2019-05-06 ASSESSMENT — TONOMETRY
OS_IOP_MMHG: 18
IOP_METHOD: TONOPEN
OD_IOP_MMHG: 16

## 2019-05-06 ASSESSMENT — CONF VISUAL FIELD
OD_NORMAL: 1
OS_NORMAL: 1
METHOD: COUNTING FINGERS

## 2019-05-06 ASSESSMENT — EXTERNAL EXAM - RIGHT EYE: OD_EXAM: NORMAL

## 2019-05-06 ASSESSMENT — EXTERNAL EXAM - LEFT EYE: OS_EXAM: NORMAL

## 2019-05-06 ASSESSMENT — SLIT LAMP EXAM - LIDS
COMMENTS: NORMAL
COMMENTS: REACTIVE PTOSIS

## 2019-05-06 NOTE — PROGRESS NOTES
CC: s/p DSAEK OS    HPI: Enrique Gabriel is a 33 year old male s/p globe repair, left eye. 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 Presented with dehiscence of cornea at limbus from 9 o'clock to 3'oclock with extremely hypotonous eye, vitreous in the anterior chamber, dislocated IOL, and vitreous hemorrhage. questionable Retinal detachment. Underwent resuturing of cornea to limbus and limited peritomy without evidence of scleral laceration.     Interval history:  Vision improving OS. States continues to have cloudiness in the AM OS. Patient states continued photophobia, less redness, tearing. Patient has been much more comfortable over past 4 weeks.    POHx;  PKP for keratoconus OU 2002  Trauma to OS with ruptured globe 2003  Repeat PKP OS 2004  Previous cataract surgery done at [facility] in [place].   Secondary IOL left eye in 2003.   H/o graft rejection left eye treated in 2011.  Ahmed tube OS 10/3/17  DSAEK, ant vit, refixated scleral Gortex sutured IOL - left eye (11/27/18)  -s/p 25G pPV/AFX/SF6 10% left eye 12/10/18     Gtts:  Brimonidine BID left eye   Cosopt BID left eye        Latanoprost at bedtime left eye  FML two times a day left (steroid response with pred forte)  Prednisolone every other day right eye   Ashutosh qid left eye  PFATs prn 3-4 times a day     Assessment/Plan:     1.  s/p repair of globe rupture/corneal dehiscence left eye (1/14/17)  2. S/p DSAEK, ant vit, refixated scleral Gortex sutured IOL - left eye (11/27/18)  S/p 25 gPPV/AFX/SF6 (12/10/18) for graft detachment              - graft clear centrally and superiorly               - Pachy 846<--802 <-- 793 <-- 795 <-- 886 <-- 913                - increase PFATs to q1-2h   - Switched to FML BID OS given elevated IOP with pf, IOP 18 today              - cont ashutosh qid OS   - IOP improved today but pachy is thicker              - return  precautions reviewed   - exposed gortex suture tail superotemporal - trimmed at slit lamp                2. AC vitreous prolapse OS              - s/p ant vitrectomy 11/27/18     3. Traumatic Aniridia OS     4. Glaucoma, OS              s/p left Ahmed valve              Continue intraocular pressure gtts as above, patient scheduled to see Dr. Starr early may.                Steroid responder; Cosopt restarted early 3/2019 for increased IOP (mid 30s)              Continue to follow-up with Dr. Starr              Continue Cosopt BID OS              Continue brimonidine BID OS    Continue latanoprost at bedtime OS     Return 1 month    Ana Paula Canchola MD  PGY-3 Ophthalmology    Attending Physician Attestation:  Complete documentation of historical and exam elements from today's encounter can be found in the full encounter summary report (not reduplicated in this progress note).  I personally obtained the chief complaint(s) and history of present illness.  I confirmed and edited as necessary the review of systems, past medical/surgical history, family history, social history, and examination findings as documented by others; and I examined the patient myself.  I personally reviewed the relevant tests, images, and reports as documented above.  I formulated and edited as necessary the assessment and plan and discussed the findings and management plan with the patient and family. - Jose G Hou MD    --------------------------------------------------------------------------------------------------------------------------------------------  Pachymetry - Interpretation & Report  Indication: post corneal transplant - DSAEK OS  Performed by: Jose G Hou MD  Reliability: good  Patient cooperation: good  Findings:   Right eye:  579 micrometers centrally    Left eye:  846 micrometers centrally   Interval Change, Assessment, & Impact on treatment:   Right eye:  Baseline, thin, no edema   Left eye:  Slightly thickened K edema  - monitor for graft failure OS   Signed: Jose G Hou 5/6/2019 11:19 AM

## 2019-05-06 NOTE — NURSING NOTE
Chief Complaints and History of Present Illnesses   Patient presents with     Follow Up     5 week follow up S/p DSAEK, ant vit, refixated scleral Gortex sutured IOL - left eye (11/27/18)     Chief Complaint(s) and History of Present Illness(es)     Follow Up     Comments: 5 week follow up S/p DSAEK, ant vit, refixated scleral Gortex sutured IOL - left eye (11/27/18)              Comments     Pt states vision improved slightly since last visit. No eye pain today.  No flashes or floaters.    Jamar FISCHER May 6, 2019 9:38 AM

## 2019-07-01 DIAGNOSIS — H40.32X0: Primary | ICD-10-CM

## 2019-07-02 RX ORDER — BRIMONIDINE TARTRATE 2 MG/ML
SOLUTION/ DROPS OPHTHALMIC
Qty: 5 ML | Refills: 11 | Status: SHIPPED | OUTPATIENT
Start: 2019-07-02 | End: 2020-08-21

## 2019-07-02 NOTE — TELEPHONE ENCOUNTER
" brimonidine (ALPHAGAN) 0.2 % ophthalmic solution  Last Written Prescription Date:  unknown  Last Fill Quantity: unknown,   # refills: unknown  Last Office Visit : 5/6/19  Future Office visit:  9/25/19 5/6/19  Ameya. \" Brimonidine BID left eye ...Continue brimonidine BID left eye...\"     Routing refill request to provider for review/approval because: historical, dx?    "

## 2019-09-25 ENCOUNTER — OFFICE VISIT (OUTPATIENT)
Dept: OPHTHALMOLOGY | Facility: CLINIC | Age: 34
End: 2019-09-25
Attending: OPHTHALMOLOGY
Payer: COMMERCIAL

## 2019-09-25 DIAGNOSIS — Z94.7 CORNEA REPLACED BY TRANSPLANT: Primary | ICD-10-CM

## 2019-09-25 DIAGNOSIS — H40.32X0: ICD-10-CM

## 2019-09-25 DIAGNOSIS — T38.0X5A STEROID INDUCED GLAUCOMA, LEFT EYE: ICD-10-CM

## 2019-09-25 DIAGNOSIS — T85.328A DISLOCATED DESCEMET'S STRIPPING ENDOTHELIAL KERATOPLASTY (DSEK) GRAFT, INITIAL ENCOUNTER: ICD-10-CM

## 2019-09-25 DIAGNOSIS — H40.62X0 STEROID INDUCED GLAUCOMA, LEFT EYE: ICD-10-CM

## 2019-09-25 PROCEDURE — G0463 HOSPITAL OUTPT CLINIC VISIT: HCPCS | Mod: ZF

## 2019-09-25 RX ORDER — SILICONE ADHESIVE 1.5" X 3"
1 SHEET (EA) TOPICAL 4 TIMES DAILY
Qty: 1 BOTTLE | Refills: 3 | Status: SHIPPED | OUTPATIENT
Start: 2019-09-25 | End: 2024-05-20

## 2019-09-25 RX ORDER — LATANOPROST 50 UG/ML
1 SOLUTION/ DROPS OPHTHALMIC AT BEDTIME
Qty: 1 BOTTLE | Refills: 11 | Status: SHIPPED | OUTPATIENT
Start: 2019-09-25 | End: 2021-08-13

## 2019-09-25 RX ORDER — DORZOLAMIDE HYDROCHLORIDE AND TIMOLOL MALEATE 20; 5 MG/ML; MG/ML
1 SOLUTION/ DROPS OPHTHALMIC 2 TIMES DAILY
Qty: 10 ML | Refills: 11 | Status: SHIPPED | OUTPATIENT
Start: 2019-09-25 | End: 2020-10-19

## 2019-09-25 ASSESSMENT — EXTERNAL EXAM - RIGHT EYE: OD_EXAM: NORMAL

## 2019-09-25 ASSESSMENT — VISUAL ACUITY
CORRECTION_TYPE: GLASSES
METHOD: SNELLEN - LINEAR
OS_CC: 20/80
OD_CC: 20/20
OS_PH_CC: 20/50

## 2019-09-25 ASSESSMENT — TONOMETRY
IOP_METHOD: TONOPEN
OS_IOP_MMHG: 33
OD_IOP_MMHG: X
OD_IOP_MMHG: 16
OS_IOP_MMHG: 42
IOP_METHOD: APPLANATION
IOP_METHOD: TONOPEN
OD_IOP_MMHG: 16
OD_IOP_MMHG: 12
OD_IOP_MMHG: 13
IOP_METHOD: APPLANATION
IOP_METHOD: APPLANATION
OS_IOP_MMHG: 42
OS_IOP_MMHG: 42
OS_IOP_MMHG: 34

## 2019-09-25 ASSESSMENT — SLIT LAMP EXAM - LIDS
COMMENTS: NORMAL
COMMENTS: REACTIVE PTOSIS

## 2019-09-25 ASSESSMENT — CUP TO DISC RATIO
OS_RATIO: 0.6
OD_RATIO: 0.3

## 2019-09-25 ASSESSMENT — PACHYMETRY
OS_CT(UM): 787
OD_CT(UM): 579

## 2019-09-25 ASSESSMENT — CONF VISUAL FIELD
OD_NORMAL: 1
OS_NORMAL: 1

## 2019-09-25 ASSESSMENT — EXTERNAL EXAM - LEFT EYE: OS_EXAM: NORMAL

## 2019-09-25 NOTE — NURSING NOTE
Chief Complaints and History of Present Illnesses   Patient presents with     Follow Up For Surgery Of Eye     Chief Complaint(s) and History of Present Illness(es)     Follow Up For Surgery Of Eye     Laterality: left eye    Course: stable    Associated symptoms: dryness.  Negative for eye pain, fever, redness and tearing    Pain scale: 0/10              Comments     DSAEK, ant vit, refixated scleral Gortex sutured IOL - left eye (11/27/18)  -s/p 25G pPV/AFX/SF6 10% left eye 12/10/18  Brimonidine BID left eye   Cosopt BID left eye        Latanoprost at bedtime left eye  FML two times a day left (steroid response with pred forte)  Prednisolone every other day right eye   Alexa qid left eye  PFATs prn 3-4 times a day / states probably gets tears in from zero to BID prn.  Keri Huber, COT COT 1:29 PM 09/25/2019

## 2019-09-25 NOTE — PROGRESS NOTES
CC: s/p DSAEK OS    HPI: Enrique Gabriel is a 33 year old male s/p globe repair, left eye. 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 Presented with dehiscence of cornea at limbus from 9 o'clock to 3'oclock with extremely hypotonous eye, vitreous in the anterior chamber, dislocated IOL, and vitreous hemorrhage. questionable Retinal detachment. Underwent resuturing of cornea to limbus and limited peritomy without evidence of scleral laceration.     Interval history:  Vision stable OS. States continues to have cloudiness in the AM OS. Patient states continued photophobia (worsened), less redness, tearing. left eye sl less comfortable.    Has been on FML BID left eye, reports almost always compliant w/ Brimonidine left eye, uses latanoprost most of the time, has been off Cosopt for 3 weeks (ran out and did not refill). On Alexa gtts QID.     POHx;  PKP for keratoconus OU 2002  Trauma to OS with ruptured globe 2003  Repeat PKP OS 2004  Previous cataract surgery done at [facility] in [place].   Secondary IOL left eye in 2003.   H/o graft rejection left eye treated in 2011.  Ahmed tube OS 10/3/17  DSAEK, ant vit, refixated scleral Gortex sutured IOL - left eye (11/27/18)  -s/p 25G pPV/AFX/SF6 10% left eye 12/10/18     Gtts:  Brimonidine BID left eye   Cosopt BID left eye (off this x3 weeks)         Latanoprost at bedtime left eye (intermittent compliance)  FML two times a day left (steroid response with pred forte)  Prednisolone every other day right eye   Alexa qid left eye  PFATs prn 3-4 times a day     Assessment/Plan:     1.  s/p repair of globe rupture/corneal dehiscence left eye (1/14/17)  2. S/p DSAEK, ant vit, refixated scleral Gortex sutured IOL - left eye (11/27/18)  S/p 25 gPPV/AFX/SF6 (12/10/18) for graft detachment              - graft clear centrally and superiorly               - Pachy 787 (central) <--  846<--802 <-- 793 <-- 795 <-- 886 <-- 913                - increase PFATs to q1-2h    - Pachy 993 temporally   - Switched to FML BID OS given elevated IOP with pf, IOP ~38 today              - cont ashutosh qid OS   - Pachy is thinner centrally, but thicker temporally   - Suspect that poor vision/corneal appearance 2/2 uncontrolled IOPs              - return precautions reviewed                2. AC vitreous prolapse OS              - s/p ant vitrectomy 11/27/18     3. Traumatic Aniridia OS     4. Glaucoma, OS              s/p left Ahmed valve              Continue intraocular pressure gtts as above, patient scheduled to see Dr. Starr early may.                Steroid responder; Cosopt restarted early 3/2019 for increased IOP (mid 30s)    Has not been using Cosopt past several weeks and sounds to be using Latanoprost about 50% of the time   Re-iterated that pt MUST use Cosopt BID, Brimonidine BID, Latanoprost at bedtime    Pt has f/u scheduled w/ Dr. Starr on 10/7/19 - hold diamox at this time, tx w/ gtts alone              Continue to follow-up with Dr. Starr              Continue Cosopt BID OS              Continue brimonidine BID OS    Continue latanoprost at bedtime OS    Could consider adding Rhopressa - defer to Glaucoma    RTC 5-6 weeks for cornea recheck - to see Dr. Starr on 10/7/19      Mian Manley MD - PGY 3  Ophthalmology resident    Attending Physician Attestation:  Complete documentation of historical and exam elements from today's encounter can be found in the full encounter summary report (not reduplicated in this progress note).  I personally obtained the chief complaint(s) and history of present illness.  I confirmed and edited as necessary the review of systems, past medical/surgical history, family history, social history, and examination findings as documented by others; and I examined the patient myself.  I personally reviewed the relevant tests, images, and reports as documented above.   I formulated and edited as necessary the assessment and plan and discussed the findings and management plan with the patient and family. - Jose G Hou MD

## 2019-10-07 ENCOUNTER — OFFICE VISIT (OUTPATIENT)
Dept: OPHTHALMOLOGY | Facility: CLINIC | Age: 34
End: 2019-10-07
Attending: OPHTHALMOLOGY
Payer: COMMERCIAL

## 2019-10-07 DIAGNOSIS — H40.32X0: Primary | ICD-10-CM

## 2019-10-07 PROCEDURE — G0463 HOSPITAL OUTPT CLINIC VISIT: HCPCS | Mod: ZF

## 2019-10-07 PROCEDURE — 92133 CPTRZD OPH DX IMG PST SGM ON: CPT | Mod: ZF | Performed by: OPHTHALMOLOGY

## 2019-10-07 ASSESSMENT — CONF VISUAL FIELD
METHOD: COUNTING FINGERS
OS_NORMAL: 1
OD_NORMAL: 1

## 2019-10-07 ASSESSMENT — CUP TO DISC RATIO
OS_RATIO: 0.6
OD_RATIO: 0.3

## 2019-10-07 ASSESSMENT — SLIT LAMP EXAM - LIDS
COMMENTS: REACTIVE PTOSIS
COMMENTS: NORMAL

## 2019-10-07 ASSESSMENT — REFRACTION_MANIFEST
OD_CYLINDER: +3.25
OS_CYLINDER: +3.50
OS_SPHERE: -3.00
OD_AXIS: 126
OS_AXIS: 156
OD_SPHERE: -6.50

## 2019-10-07 ASSESSMENT — VISUAL ACUITY
OS_CC+: +1
OD_CC: 20/20
OS_CC: 20/100
OS_PH_CC: 20/40
OS_PH_CC+: -1
METHOD: SNELLEN - LINEAR
CORRECTION_TYPE: GLASSES

## 2019-10-07 ASSESSMENT — EXTERNAL EXAM - LEFT EYE: OS_EXAM: NORMAL

## 2019-10-07 ASSESSMENT — EXTERNAL EXAM - RIGHT EYE: OD_EXAM: NORMAL

## 2019-10-07 ASSESSMENT — TONOMETRY
OD_IOP_MMHG: 15
OS_IOP_MMHG: 23
IOP_METHOD: APPLANATION

## 2019-10-07 ASSESSMENT — REFRACTION_WEARINGRX
OD_SPHERE: -6.50
OS_SPHERE: -0.25
OD_AXIS: 125
OS_CYLINDER: +2.25
OS_AXIS: 123
OD_CYLINDER: +3.25

## 2019-10-07 NOTE — PROGRESS NOTES
CC: Ahmed tube with corneal patch graft LEFT eye 10/3/17, history of steroid response    Interval history:   Notes some fluctuation in vision in the left eye. Denies eye pain, irritation, discharge or tearing. Recently seen by  and noted to have IOP in the 30-40's and was not taking all IOP drops at that time.     HPI: High intraocular pressure following globe repair and steroids for rejection, left eye. 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 Presented with dehiscence of cornea at limbus from 9 o'clock to 3'oclock with extremely hypotonous eye, vitreous in the anterior chamber, dislocated IOL, and vitreous hemorrhage. Underwent resuturing of cornea to limbus and limited peritomy without evidence of scleral laceration.    Past ocular history    PKP for keratoconus OU 2002  Trauma to OS with ruptured globe 2003  Repeat PKP OS 2004  Previous cataract surgery done at [facility] in [place].   Secondary IOL left eye in 2003.   H/o graft rejection left eye treated in 2011.  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 Joseph in past, however, he does not want to wear CL at this point. Still functioning with glasses  May require repeat PKP after resolution of rejection +/- reposition of IOL  following with      AC vitreous prolapse OS  s/p ant vitrectomy 11/27/18  S/p post 25G PPV/AFX/SF6 10% OS 12/10/18 for detached DSAEK graft OS    Plan:  Continue Cosopt BID OS  Continue brimonidine BID OS  Continue latanoprost at bedtime OS    Emphasized importance of compliance with drops. Could consider adding Rhopressa in future if needed    Return to clinic: 6 months IOP check and LVC left eye     Dimas Gonzales MD  Ophthalmology Resident, PGY-3    Attending Physician Attestation:  Complete documentation of historical and exam elements from today's encounter can be found in the full encounter summary report (not reduplicated in this progress note). I personally obtained the chief complaint(s) and history of present illness. I confirmed and edited asnecessary the review of systems, past medical/surgical history, family history, social history, and examination findings as documented by others; and I examined the patient myself. I personally reviewed the relevant tests, images, and reports as documented above. I formulated and edited as necessary the assessment and plan and discussed the findings and management plan with the patient and family.  - Yue Starr MD 10:56 AM 10/7/2019

## 2019-10-07 NOTE — NURSING NOTE
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  Denies any pain, pressure, irritation, discharge, and tearing.  Currently using Brimonidine BID LE, Cosopt BID LE, Latanoprost at bedtime LE, FML BID LE, Prednisolone every other day RE, Alexa-120 QID LE, and PFATs occasionally.    Riri Garsia OT 9:33 AM October 7, 2019

## 2019-11-20 ENCOUNTER — OFFICE VISIT (OUTPATIENT)
Dept: OPHTHALMOLOGY | Facility: CLINIC | Age: 34
End: 2019-11-20
Attending: OPHTHALMOLOGY
Payer: COMMERCIAL

## 2019-11-20 DIAGNOSIS — H40.62X0 STEROID INDUCED GLAUCOMA, LEFT EYE: ICD-10-CM

## 2019-11-20 DIAGNOSIS — T86.8409 REJECTION OF CORNEA TRANSPLANT: ICD-10-CM

## 2019-11-20 DIAGNOSIS — H40.32X0: ICD-10-CM

## 2019-11-20 DIAGNOSIS — T38.0X5A STEROID INDUCED GLAUCOMA, LEFT EYE: ICD-10-CM

## 2019-11-20 DIAGNOSIS — Z94.7 CORNEA REPLACED BY TRANSPLANT: Primary | ICD-10-CM

## 2019-11-20 PROCEDURE — G0463 HOSPITAL OUTPT CLINIC VISIT: HCPCS | Mod: ZF

## 2019-11-20 RX ORDER — CYCLOSPORINE 0.5 MG/ML
1 EMULSION OPHTHALMIC 4 TIMES DAILY
Qty: 1 EACH | Refills: 11 | Status: SHIPPED | OUTPATIENT
Start: 2019-11-20 | End: 2024-05-20

## 2019-11-20 ASSESSMENT — VISUAL ACUITY
CORRECTION_TYPE: GLASSES
OS_PH_CC: 20/50
OD_CC: 20/25
OS_CC: 20/100
OS_CC+: -1
METHOD: SNELLEN - LINEAR
OS_PH_CC+: +1

## 2019-11-20 ASSESSMENT — SLIT LAMP EXAM - LIDS
COMMENTS: REACTIVE PTOSIS
COMMENTS: NORMAL

## 2019-11-20 ASSESSMENT — TONOMETRY
IOP_METHOD: TONOPEN
OD_IOP_MMHG: 15
OS_IOP_MMHG: 21

## 2019-11-20 ASSESSMENT — CONF VISUAL FIELD
OS_NORMAL: 1
OD_NORMAL: 1
METHOD: COUNTING FINGERS

## 2019-11-20 ASSESSMENT — EXTERNAL EXAM - RIGHT EYE: OD_EXAM: NORMAL

## 2019-11-20 ASSESSMENT — PACHYMETRY: OS_CT(UM): 879

## 2019-11-20 ASSESSMENT — CUP TO DISC RATIO
OS_RATIO: 0.6
OD_RATIO: 0.3

## 2019-11-20 ASSESSMENT — REFRACTION_WEARINGRX
OS_SPHERE: -0.25
OD_CYLINDER: +3.25
OS_AXIS: 123
OD_SPHERE: -6.50
OS_CYLINDER: +2.25
OD_AXIS: 125

## 2019-11-20 ASSESSMENT — EXTERNAL EXAM - LEFT EYE: OS_EXAM: NORMAL

## 2019-11-20 NOTE — NURSING NOTE
Chief Complaints and History of Present Illnesses   Patient presents with     Follow Up     Cornea replaced by transplant      Chief Complaint(s) and History of Present Illness(es)     Follow Up     Associated symptoms: dryness and trauma (OS).  Negative for eye pain, tearing, glare, haloes and headache    Comments: Cornea replaced by transplant               Comments     Pt feels vision is a little better since last visit.  Pt has no other concerns or pain today.    JESSICA Gregory November 20, 2019 2:29 PM

## 2019-11-20 NOTE — PROGRESS NOTES
CC: s/p DSAEK OS    HPI: Enrique Gabriel is a 34 year old male s/p globe repair, left eye. 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 Presented with dehiscence of cornea at limbus from 9 o'clock to 3'oclock with extremely hypotonous eye, vitreous in the anterior chamber, dislocated IOL, and vitreous hemorrhage. questionable Retinal detachment. Underwent resuturing of cornea to limbus and limited peritomy without evidence of scleral laceration.     Interval history:  Vision stable OS. Patient states continued photophobia (better) and haze in the AM (improved) less redness, tearing. left eye sl less comfortable. Patient has been compliant with administering medications as instructed. No new flashes, floaters, diplopia.    POHx;  PKP for keratoconus OU 2002  Trauma to OS with ruptured globe 2003  Repeat PKP OS 2004  Previous cataract surgery done at [facility] in [place].   Secondary IOL left eye in 2003.   H/o graft rejection left eye treated in 2011.  Ahmed tube OS 10/3/17  DSAEK, ant vit, refixated scleral Gortex sutured IOL - left eye (11/27/18)  -s/p 25G pPV/AFX/SF6 10% left eye 12/10/18     Gtts:  Brimonidine BID left eye   Cosopt BID left eye          Latanoprost at bedtime left eye   FML two times a day left (steroid response with pred forte)  Prednisolone every other day right eye   Ashutosh qid left eye       Assessment/Plan:     1.  s/p repair of globe rupture/corneal dehiscence left eye (1/14/17)  2. S/p DSAEK, ant vit, refixated scleral Gortex sutured IOL - left eye (11/27/18)  S/p 25 gPPV/AFX/SF6 (12/10/18) for graft detachment   - Continue FML BID OS given elevated IOP with pf              - cont ashutosh qid OS   - Suspect that poor vision/corneal appearance 2/2 uncontrolled IOPs   - continue brimonidine, cosopt, and latanoprost               - Pachy thicker with worsening K edema OS   - start cyclosporine 1%  QID OS                2. AC vitreous prolapse OS              - s/p ant vitrectomy 11/27/18     3. Traumatic Aniridia OS     4. Glaucoma, OS              s/p left Ahmed valve              Continue intraocular pressure gtts as above              Steroid responder   Continue to follow-up with Dr. Starr              Continue Cosopt BID OS              Continue brimonidine BID OS    Continue latanoprost at bedtime OS    Could consider adding Rhopressa - defer to Glaucoma    Follow up with cornea clinic 1-2 months with repeat pachy.  Has been recommended follow up with glaucoma (dr. Starr) next in April    --  Marifer Briscoe,   PGY 5, Cornea Fellow  Ophthalmology    Attending Physician Attestation:  Complete documentation of historical and exam elements from today's encounter can be found in the full encounter summary report (not reduplicated in this progress note).  I personally obtained the chief complaint(s) and history of present illness.  I confirmed and edited as necessary the review of systems, past medical/surgical history, family history, social history, and examination findings as documented by others; and I examined the patient myself.  I personally reviewed the relevant tests, images, and reports as documented above.  I formulated and edited as necessary the assessment and plan and discussed the findings and management plan with the patient and family. - Jose G Hou MD    --------------------------------------------------------------------------------------------------------------------------------------------  Pachymetry - Interpretation & Report  Indication: corneal edema, s/p corneal transplant OS  Performed by: Jose G Hou MD2  Reliability: good  Patient cooperation: good  Findings:   Left eye:  879 micrometers centrally   Interval Change, Assessment, & Impact on treatment:   Left eye:  Thicker K edema OS   Signed: Jose G Hou MD 11/20/2019 11:23 PM

## 2019-12-10 ENCOUNTER — TELEPHONE (OUTPATIENT)
Dept: OPHTHALMOLOGY | Facility: CLINIC | Age: 34
End: 2019-12-10

## 2019-12-10 NOTE — TELEPHONE ENCOUNTER
Called pt on 12/10/19 Called pt to reschedule appt for Feb, but pt's voicemail was not set up, so I left a voicemail on his wife's     Kaylyn

## 2020-03-10 ENCOUNTER — HEALTH MAINTENANCE LETTER (OUTPATIENT)
Age: 35
End: 2020-03-10

## 2020-06-03 DIAGNOSIS — Z94.7 CORNEA REPLACED BY TRANSPLANT: ICD-10-CM

## 2020-06-03 DIAGNOSIS — T38.0X5A STEROID INDUCED GLAUCOMA, LEFT EYE: ICD-10-CM

## 2020-06-03 DIAGNOSIS — H40.62X0 STEROID INDUCED GLAUCOMA, LEFT EYE: ICD-10-CM

## 2020-06-03 RX ORDER — FLUOROMETHOLONE 0.1 %
1 SUSPENSION, DROPS(FINAL DOSAGE FORM)(ML) OPHTHALMIC (EYE) 2 TIMES DAILY
Qty: 1 BOTTLE | Refills: 4 | Status: SHIPPED | OUTPATIENT
Start: 2020-06-03 | End: 2021-03-30

## 2020-06-03 NOTE — TELEPHONE ENCOUNTER
The patient was supposed to follow with cornea clinic in 1/2020. Will route note to cornea clinic for review.

## 2020-06-03 NOTE — TELEPHONE ENCOUNTER
fluorometholone (FML LIQUIFILM) 0.1 % ophthalmic suspension   Dx:  Steroid induced glaucoma, left eye - Left Eye; Cornea replaced by transplant - Left Eye      Requested directions:  Place 1 drop Into the left eye 2 times daily - Left Eye   Current directions on the medication list:  Place 1 drop Into the left eye 2 times daily - Left Eye     Last Written Prescription Date:  5/6/2019  Last Fill Quantity: 1,   # refills: 11    Last Office Visit:  11/20/2019  Future Office visit:  None    Attending Provider:  Jose G Hou MD Ophthalmology   Last Clinic Note:  11/20/2019  Follow Up     Associated symptoms include dryness and trauma (OS).  Negative for eye pain, tearing, glare, haloes and headache. Additional comments: Cornea replaced by transplant          Comments     Pt feels vision is a little better since last visit.  Pt has no other concerns or pain today.     JESSICA Gregory November 20, 2019 2:29 PM           Instructions         Return in about 2 months (around 1/20/2020) for Follow Up, pachy OU, Vision, Pressure.       Routing refill request to provider for review/approval because:  Med requires Providers review for continued refills for Pt care.    Patricia Pastrana RN  Central Triage Red Flags/Med Refills

## 2020-09-30 NOTE — NURSING NOTE
Chief Complaints and History of Present Illnesses   Patient presents with     Follow Up For     s/p repair of globe rupture/corneal dehiscence left eye (1/14/17)     HPI    Affected eye(s):  Both, Left   Symptoms:     No decreased vision   No Dryness   No itching   No burning      Duration:  5 months      Do you have eye pain now?:  No      Comments:  Follow up for s/p repair of globe rupture/corneal dehiscence left eye (1/14/17).    CLYDE Louis 2:23 PM 04/11/2018                 [FreeTextEntry1] : 1.   cad -  3/4 grafts closed. will attempt pci of native vessels.

## 2020-10-19 DIAGNOSIS — H40.32X0: ICD-10-CM

## 2020-10-19 NOTE — TELEPHONE ENCOUNTER
Medication: Dorzolamide HCl-Timolol Mal 22.3-6.8 MG/ML Ophthalmic Solution (Cosopt)    Requested directions: Place 1 Drop into the left eye two times a day.  Current directions on the medication list: Same    Last Written Prescription Date:  9-25-19  Last Fill Quantity: 10 ml,   # refills: 11    Last Office Visit: 11-20-19  Future Office visit: none    Attending Provider: 11-20-19 Ameya Med not in clinic note/plan  10-7-19 Starr: Plan::Continue Cosopt BID OS      Routing refill request to provider for review/approval because:  Med not in last eye clinic note plan ( Ameya appt)

## 2020-10-21 RX ORDER — DORZOLAMIDE HYDROCHLORIDE AND TIMOLOL MALEATE 20; 5 MG/ML; MG/ML
1 SOLUTION/ DROPS OPHTHALMIC 2 TIMES DAILY
Qty: 10 ML | Refills: 0 | Status: SHIPPED | OUTPATIENT
Start: 2020-10-21 | End: 2021-03-30

## 2020-12-27 ENCOUNTER — HEALTH MAINTENANCE LETTER (OUTPATIENT)
Age: 35
End: 2020-12-27

## 2021-04-24 ENCOUNTER — HEALTH MAINTENANCE LETTER (OUTPATIENT)
Age: 36
End: 2021-04-24

## 2021-06-23 DIAGNOSIS — H40.62X0 STEROID INDUCED GLAUCOMA, LEFT EYE: ICD-10-CM

## 2021-06-23 DIAGNOSIS — T38.0X5A STEROID INDUCED GLAUCOMA, LEFT EYE: ICD-10-CM

## 2021-06-23 DIAGNOSIS — H40.32X0: ICD-10-CM

## 2021-06-23 DIAGNOSIS — Z94.7 CORNEA REPLACED BY TRANSPLANT: ICD-10-CM

## 2021-06-23 NOTE — TELEPHONE ENCOUNTER
Medication: Brimonidine Tartrate 0.2 % Ophthalmic Solution (Alphagan)    Requested directions: Place 1 drop Into the left eye 2 times daily   Current directions on the medication list: same    Last Written Prescription Date:  3/31/21  Last Fill Quantity: 5 ml,   # refills: 0    Last Office Visit: 11/20/19  Future Office visit: none scheduled    Attending Provider: Ameya  Last Clinic Note: Assessment/Plan:     1.  s/p repair of globe rupture/corneal dehiscence left eye (1/14/17)  2. S/p DSAEK, ant vit, refixated scleral Gortex sutured IOL - left eye (11/27/18)  S/p 25 gPPV/AFX/SF6 (12/10/18) for graft detachment              - Continue FML BID OS given elevated IOP with pf              - cont ashutosh qid OS              - Suspect that poor vision/corneal appearance 2/2 uncontrolled IOPs              - continue brimonidine, cosopt, and latanoprost               - Pachy thicker with worsening K edema OS              - start cyclosporine 1% QID OS                2. AC vitreous prolapse OS              - s/p ant vitrectomy 11/27/18     3. Traumatic Aniridia OS     4. Glaucoma, OS              s/p left Ahmed valve              Continue intraocular pressure gtts as above              Steroid responder              Continue to follow-up with Dr. Starr              Continue Cosopt BID OS              Continue brimonidine BID OS               Continue latanoprost at bedtime OS                Could consider adding Rhopressa - defer to Glaucoma     Follow up with cornea clinic 1-2 months with repeat pachy.  Has been recommended follow up with glaucoma (dr. Starr) next in April       Routing refill request to provider for review/approval because:  Last filled nearly 3 months ago for limited supply, should have been out much sooner.  Last refill notes: Notes to Pharmacy: NO additional refills until patient seen at [facility] eye clinic.  Requires provider review        Medication: : Fluorometholone 0.1 % Ophthalmic Suspension (FML)     Requested directions: Place 1 drop Into the left eye 2 times daily   Current directions on the medication list: same    Last Office Visit: 11/20/19  Future Office visit: none scheduled    Attending Provider: Ameya  Last Clinic Note: Assessment/Plan:     1.  s/p repair of globe rupture/corneal dehiscence left eye (1/14/17)  2. S/p DSAEK, ant vit, refixated scleral Gortex sutured IOL - left eye (11/27/18)  S/p 25 gPPV/AFX/SF6 (12/10/18) for graft detachment              - Continue FML BID OS given elevated IOP with pf              - cont ashutosh qid OS              - Suspect that poor vision/corneal appearance 2/2 uncontrolled IOPs              - continue brimonidine, cosopt, and latanoprost               - Pachy thicker with worsening K edema OS              - start cyclosporine 1% QID OS                2. AC vitreous prolapse OS              - s/p ant vitrectomy 11/27/18     3. Traumatic Aniridia OS     4. Glaucoma, OS              s/p left Ahmed valve              Continue intraocular pressure gtts as above              Steroid responder              Continue to follow-up with Dr. Starr              Continue Cosopt BID OS              Continue brimonidine BID OS               Continue latanoprost at bedtime OS                Could consider adding Rhopressa - defer to Glaucoma     Follow up with cornea clinic 1-2 months with repeat pachy.  Has been recommended follow up with glaucoma (dr. Starr) next in April       Routing refill request to provider for review/approval because:  Last filled nearly 3 months ago for limited supply, should have been out much sooner.  Last refill notes: Notes to Pharmacy: NO additional refills until patient seen at [facility] eye clinic.  Requires provider review  Not on protocol

## 2021-06-25 RX ORDER — FLUOROMETHOLONE 0.1 %
1 SUSPENSION, DROPS(FINAL DOSAGE FORM)(ML) OPHTHALMIC (EYE) 2 TIMES DAILY
Qty: 5 ML | Refills: 0 | Status: SHIPPED | OUTPATIENT
Start: 2021-06-25 | End: 2021-08-13

## 2021-06-25 RX ORDER — BRIMONIDINE TARTRATE 2 MG/ML
1 SOLUTION/ DROPS OPHTHALMIC 2 TIMES DAILY
Qty: 5 ML | Refills: 0 | Status: SHIPPED | OUTPATIENT
Start: 2021-06-25 | End: 2021-08-13

## 2021-08-13 ENCOUNTER — TELEPHONE (OUTPATIENT)
Dept: OPHTHALMOLOGY | Facility: CLINIC | Age: 36
End: 2021-08-13

## 2021-08-13 DIAGNOSIS — Z94.7 CORNEA REPLACED BY TRANSPLANT: ICD-10-CM

## 2021-08-13 DIAGNOSIS — T38.0X5A STEROID INDUCED GLAUCOMA, LEFT EYE: ICD-10-CM

## 2021-08-13 DIAGNOSIS — H40.32X0: ICD-10-CM

## 2021-08-13 DIAGNOSIS — H40.62X0 STEROID INDUCED GLAUCOMA, LEFT EYE: ICD-10-CM

## 2021-08-13 RX ORDER — DORZOLAMIDE HYDROCHLORIDE AND TIMOLOL MALEATE 20; 5 MG/ML; MG/ML
1 SOLUTION/ DROPS OPHTHALMIC 2 TIMES DAILY
Qty: 10 ML | Refills: 1 | Status: SHIPPED | OUTPATIENT
Start: 2021-08-13 | End: 2022-01-06

## 2021-08-13 RX ORDER — LATANOPROST 50 UG/ML
1 SOLUTION/ DROPS OPHTHALMIC AT BEDTIME
Qty: 2.5 ML | Refills: 1 | Status: SHIPPED | OUTPATIENT
Start: 2021-08-13 | End: 2022-01-06

## 2021-08-13 RX ORDER — FLUOROMETHOLONE 0.1 %
1 SUSPENSION, DROPS(FINAL DOSAGE FORM)(ML) OPHTHALMIC (EYE) 2 TIMES DAILY
Qty: 5 ML | Refills: 1 | Status: SHIPPED | OUTPATIENT
Start: 2021-08-13 | End: 2021-11-12

## 2021-08-13 RX ORDER — BRIMONIDINE TARTRATE 2 MG/ML
1 SOLUTION/ DROPS OPHTHALMIC 2 TIMES DAILY
Qty: 5 ML | Refills: 1 | Status: SHIPPED | OUTPATIENT
Start: 2021-08-13 | End: 2022-01-06

## 2021-08-13 NOTE — TELEPHONE ENCOUNTER
Pt has f/u appt sept 27th with     Rx's sent per request    Mario Lee RN 11:18 AM 08/13/21          [facility] Call [facility]    Phone Message    May a detailed message be left on voicemail: yes     Reason for Call: Medication Refill Request    Has the patient contacted the pharmacy for the refill? Yes   Name of medication being requested: latanoprost (XALATAN) 0.005 % ophthalmic solution  dorzolamide-timolol (COSOPT) 2-0.5 % ophthalmic solution  fluorometholone (FML LIQUIFILM) 0.1 % ophthalmic suspension  brimonidine (ALPHAGAN) 0.2 % ophthalmic solution  Provider who prescribed the medication:   Pharmacy: [facility] [place] [id number] [address]  Date medication is needed: ASAP         Action Taken: Message routed to:  [facility] eye    Travel Screening: Not Applicable

## 2021-10-09 ENCOUNTER — HEALTH MAINTENANCE LETTER (OUTPATIENT)
Age: 36
End: 2021-10-09

## 2021-11-08 DIAGNOSIS — H40.62X0 STEROID INDUCED GLAUCOMA, LEFT EYE: ICD-10-CM

## 2021-11-08 DIAGNOSIS — Z94.7 CORNEA REPLACED BY TRANSPLANT: ICD-10-CM

## 2021-11-08 DIAGNOSIS — T38.0X5A STEROID INDUCED GLAUCOMA, LEFT EYE: ICD-10-CM

## 2021-11-08 RX ORDER — FLUOROMETHOLONE 0.1 %
SUSPENSION, DROPS(FINAL DOSAGE FORM)(ML) OPHTHALMIC (EYE)
Qty: 5 ML | Refills: 1 | OUTPATIENT
Start: 2021-11-08

## 2021-11-08 NOTE — TELEPHONE ENCOUNTER
Last Clinic Visit: 11/20/19, no upcoming appointments.  Received yaz refill with last fill and notification to schedule follow up. Refill declined per protocol, routed to clinic scheduling for follow up

## 2021-11-09 ENCOUNTER — TELEPHONE (OUTPATIENT)
Dept: OPHTHALMOLOGY | Facility: CLINIC | Age: 36
End: 2021-11-09
Payer: COMMERCIAL

## 2021-11-09 DIAGNOSIS — H40.62X0 STEROID INDUCED GLAUCOMA, LEFT EYE: ICD-10-CM

## 2021-11-09 DIAGNOSIS — T38.0X5A STEROID INDUCED GLAUCOMA, LEFT EYE: ICD-10-CM

## 2021-11-09 DIAGNOSIS — Z94.7 CORNEA REPLACED BY TRANSPLANT: ICD-10-CM

## 2021-11-09 NOTE — TELEPHONE ENCOUNTER
I spoke to the patient who is in need of FML refill.  He is overdue for appointment but he will be scheduled in two weeks.  I told him I would check and see if he could get a one month refill before his appointment.  [facility] is correct.  Sent to cornea team

## 2021-11-12 RX ORDER — FLUOROMETHOLONE 0.1 %
1 SUSPENSION, DROPS(FINAL DOSAGE FORM)(ML) OPHTHALMIC (EYE) 2 TIMES DAILY
Qty: 5 ML | Refills: 0 | Status: SHIPPED | OUTPATIENT
Start: 2021-11-12 | End: 2022-01-06

## 2021-11-12 NOTE — TELEPHONE ENCOUNTER
Cornea transplant history    By scheduled next week with Dr. Macedo    Rx sent and supervisor reaching out to review necessity of clinic visit for further refills    Mario eLe RN 3:38 PM 11/12/21          [facility] Call [facility]    Phone Message    May a detailed message be left on voicemail: yes     Reason for Call: Medication Refill Request    Has the patient contacted the pharmacy for the refill? Yes   Name of medication being requested: fluorometholone (FML LIQUIFILM) 0.1 % ophthalmic suspension     Provider who prescribed the medication:   Pharmacy: [address]  Date medication is needed: ASAP. Pt has appt. Scheduled for 11/19 but is out. Needs before weekend.     Action Taken: Message routed to:  [facility] EYE    Travel Screening: Not Applicable

## 2021-11-12 NOTE — TELEPHONE ENCOUNTER
[facility] Call [facility]    Phone Message    May a detailed message be left on voicemail: yes     Reason for Call: Medication Refill Request    Has the patient contacted the pharmacy for the refill? Yes   Name of medication being requested: [email or web address]  Provider who prescribed the medication:   [address]   Date medication is needed: Asap       Action Taken: Message routed to:  [facility] EYE    Travel Screening: Not Applicable

## 2022-01-06 ENCOUNTER — OFFICE VISIT (OUTPATIENT)
Dept: OPHTHALMOLOGY | Facility: CLINIC | Age: 37
End: 2022-01-06
Attending: OPHTHALMOLOGY
Payer: COMMERCIAL

## 2022-01-06 DIAGNOSIS — H40.62X0 STEROID INDUCED GLAUCOMA, LEFT EYE: ICD-10-CM

## 2022-01-06 DIAGNOSIS — T38.0X5A STEROID INDUCED GLAUCOMA, LEFT EYE: ICD-10-CM

## 2022-01-06 DIAGNOSIS — Z94.7 CORNEA REPLACED BY TRANSPLANT: Primary | ICD-10-CM

## 2022-01-06 DIAGNOSIS — H40.32X0: ICD-10-CM

## 2022-01-06 PROCEDURE — G0463 HOSPITAL OUTPT CLINIC VISIT: HCPCS | Mod: 25

## 2022-01-06 PROCEDURE — 99214 OFFICE O/P EST MOD 30 MIN: CPT | Mod: GC | Performed by: OPHTHALMOLOGY

## 2022-01-06 RX ORDER — FLUOROMETHOLONE 0.1 %
SUSPENSION, DROPS(FINAL DOSAGE FORM)(ML) OPHTHALMIC (EYE)
Qty: 10 ML | Refills: 3 | Status: SHIPPED | OUTPATIENT
Start: 2022-01-06 | End: 2022-11-25

## 2022-01-06 RX ORDER — OFLOXACIN 3 MG/ML
1-2 SOLUTION/ DROPS OPHTHALMIC 4 TIMES DAILY
Qty: 5 ML | Refills: 0 | Status: SHIPPED | OUTPATIENT
Start: 2022-01-06 | End: 2022-01-10

## 2022-01-06 RX ORDER — DORZOLAMIDE HYDROCHLORIDE AND TIMOLOL MALEATE 20; 5 MG/ML; MG/ML
1 SOLUTION/ DROPS OPHTHALMIC 2 TIMES DAILY
Qty: 10 ML | Refills: 3 | Status: SHIPPED | OUTPATIENT
Start: 2022-01-06 | End: 2023-12-15

## 2022-01-06 RX ORDER — BRIMONIDINE TARTRATE 2 MG/ML
1 SOLUTION/ DROPS OPHTHALMIC 2 TIMES DAILY
Qty: 10 ML | Refills: 3 | Status: SHIPPED | OUTPATIENT
Start: 2022-01-06 | End: 2023-12-15

## 2022-01-06 RX ORDER — LATANOPROST 50 UG/ML
1 SOLUTION/ DROPS OPHTHALMIC AT BEDTIME
Qty: 2.5 ML | Refills: 7 | Status: SHIPPED | OUTPATIENT
Start: 2022-01-06 | End: 2024-05-20

## 2022-01-06 ASSESSMENT — TONOMETRY
OD_IOP_MMHG: 12
IOP_METHOD: ICARE
OS_IOP_MMHG: 17

## 2022-01-06 ASSESSMENT — EXTERNAL EXAM - RIGHT EYE: OD_EXAM: NORMAL

## 2022-01-06 ASSESSMENT — PACHYMETRY
EXAM_DATE: 1/6/2022
OS_CT(UM): 870

## 2022-01-06 ASSESSMENT — SLIT LAMP EXAM - LIDS
COMMENTS: NORMAL
COMMENTS: REACTIVE PTOSIS

## 2022-01-06 ASSESSMENT — VISUAL ACUITY
METHOD: SNELLEN - LINEAR
OS_SC: 20/200
OD_CC: 20/20
OD_CC+: -1
OS_PH_SC: 20/40
CORRECTION_TYPE: CONTACTS

## 2022-01-06 ASSESSMENT — CONF VISUAL FIELD
OD_NORMAL: 1
METHOD: COUNTING FINGERS
OS_NORMAL: 1

## 2022-01-06 ASSESSMENT — CUP TO DISC RATIO
OD_RATIO: 0.3
OS_RATIO: 0.6

## 2022-01-06 ASSESSMENT — EXTERNAL EXAM - LEFT EYE: OS_EXAM: NORMAL

## 2022-01-06 NOTE — PROGRESS NOTES
CC: s/p DSAEK OS    HPI: Enrique Gabriel male s/p globe repair, left eye. 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 Presented with dehiscence of cornea at limbus from 9 o'clock to 3'oclock with extremely hypotonous eye, vitreous in the anterior chamber, dislocated IOL, and vitreous hemorrhage. questionable Retinal detachment. Underwent resuturing of cornea to limbus and limited peritomy without evidence of scleral laceration.     Interval history:  Pt here for annual follow up.  He has not been seen by  since 11/2019.  He feels vision is stable each eye.  States continues to have cloudiness in the AM OS. Patient states continued photophobia (stable), less redness, tearing.  Pt is not currently interested in further surgical manipulation of the left eye.    POHx;  PKP for keratoconus OU 2002  Trauma to OS with ruptured globe 2003  Repeat PKP OS 2004  Previous cataract surgery done at [facility] in [place].   Secondary IOL left eye in 2003.   H/o graft rejection left eye treated in 2011.  Ahmed tube OS 10/3/17  DSAEK, ant vit, refixated scleral Gortex sutured IOL - left eye (11/27/18)  -s/p 25G pPV/AFX/SF6 10% left eye 12/10/18     Gtts:  Brimonidine BID left eye   Cosopt BID left eye     Latanoprost at bedtime left eye  FML two times a day left (steroid response with pred forte), using this every couple of days in the right eye  Prednisolone every other day right eye (not using in the right eye)  Alexa BID left eye  PFATs prn 3-4 times a day     Assessment/Plan:     1.  s/p repair of globe rupture/corneal dehiscence left eye (1/14/17)  2. S/p DSAEK, ant vit, refixated scleral Gortex sutured IOL - left eye (11/27/18)  S/p 25 gPPV/AFX/SF6 (12/10/18) for graft detachment              - graft diffusely edematous                - Pachy 870 (central) <-- 846<--802 <-- 793 <-- 795 <-- 886 <-- 913                -  increase PFATs to q1-2h   - Continue on FML BID OS              - cont ashutosh qid left eye   - Add JAMES OS every 2-3 hours for ANA - disc need for lubrication              - return precautions reviewed   - Removed 7 OC loose suture left eye (oflox placed afterward)   - RX left eye oflox qid x 4 days   - Monocular precautions.              2. AC vitreous prolapse OS              - s/p ant vitrectomy 11/27/18     3. Traumatic Aniridia OS     4. Glaucoma, OS              s/p left Ahmed valve   -IOP good today              Steroid (pred acetate) responder; Cosopt restarted early 3/2019 for increased IOP (mid 30s)               Continue Cosopt BID OS              Continue brimonidine BID OS    Continue latanoprost at bedtime OS     ALSO sees:  (used to see Liz)    RTC: 6 months with .    Mian Collazo,   Fellow, Cornea & External Disease  Department of Ophthalmology  [facility]      Attending Physician Attestation:  Complete documentation of historical and exam elements from today's encounter can be found in the full encounter summary report (not reduplicated in this progress note).  I personally obtained the chief complaint(s) and history of present illness.  I confirmed and edited as necessary the review of systems, past medical/surgical history, family history, social history, and examination findings as documented by others; and I examined the patient myself.  I personally reviewed the relevant tests, images, and reports as documented above.  I formulated and edited as necessary the assessment and plan and discussed the findings and management plan with the patient and family. - Rual Rodriguez MD

## 2022-01-06 NOTE — NURSING NOTE
Chief Complaints and History of Present Illnesses   Patient presents with     Cornea Transplant Follow Up     Chief Complaint(s) and History of Present Illness(es)     Cornea Transplant Follow Up     Laterality: left eye    Quality: blurred vision    Course: stable    Associated symptoms: Negative for eye pain, tearing and discharge    Pain scale: 0/10              Comments     2+ year follow up cornea transplant, left eye.  Ocular meds: FML BID LE, Brimonidine BID LE, Cosopt BID LE, Latanoprost at bedtime LE, Alexa 128 BID LE & genteal PRN LE    Riri Ga OT 12:03 PM January 6, 2022

## 2022-05-16 ENCOUNTER — HEALTH MAINTENANCE LETTER (OUTPATIENT)
Age: 37
End: 2022-05-16

## 2022-06-20 NOTE — TELEPHONE ENCOUNTER
----- Message from Veronica Lockwood sent at 9/26/2017 10:07 AM CDT -----  Regarding: Pt request  Contact: [phone]  Pt stated that Dr. Salvador told him he was supposed to be called to schedule an appt with a glaucoma specialist this week. Pt has not received a call and there are no openings with glaucoma providers this week.     Pt can be reached at number above.    Thanks!- lel    Please DO NOT send this message and/or reply back to sender.  Call Center Representatives DO NOT respond to messages.     Interpolation Flap Text: A decision was made to reconstruct the defect utilizing an interpolation axial flap and a staged reconstruction.  A telfa template was made of the defect.  This telfa template was then used to outline the interpolation flap.  The donor area for the pedicle flap was then injected with anesthesia.  The flap was excised through the skin and subcutaneous tissue down to the layer of the underlying musculature.  The interpolation flap was carefully excised within this deep plane to maintain its blood supply.  The edges of the donor site were undermined.   The donor site was closed in a primary fashion.  The pedicle was then rotated into position and sutured.  Once the tube was sutured into place, adequate blood supply was confirmed with blanching and refill.  The pedicle was then wrapped with xeroform gauze and dressed appropriately with a telfa and gauze bandage to ensure continued blood supply and protect the attached pedicle.

## 2022-09-11 ENCOUNTER — HEALTH MAINTENANCE LETTER (OUTPATIENT)
Age: 37
End: 2022-09-11

## 2022-09-27 ENCOUNTER — TELEPHONE (OUTPATIENT)
Dept: OPHTHALMOLOGY | Facility: CLINIC | Age: 37
End: 2022-09-27

## 2022-11-25 DIAGNOSIS — H40.62X0 STEROID INDUCED GLAUCOMA, LEFT EYE: ICD-10-CM

## 2022-11-25 DIAGNOSIS — T38.0X5A STEROID INDUCED GLAUCOMA, LEFT EYE: ICD-10-CM

## 2022-11-25 DIAGNOSIS — Z94.7 CORNEA REPLACED BY TRANSPLANT: ICD-10-CM

## 2022-11-25 RX ORDER — FLUOROMETHOLONE 0.1 %
SUSPENSION, DROPS(FINAL DOSAGE FORM)(ML) OPHTHALMIC (EYE)
Qty: 10 ML | Refills: 4 | Status: SHIPPED | OUTPATIENT
Start: 2022-11-25 | End: 2023-12-15

## 2022-11-25 NOTE — TELEPHONE ENCOUNTER
fluorometholone (FML LIQUIFILM) 0.1 % ophthalmic suspension  Last Written Prescription Date:   1/6/2022  Last Fill Quantity: 10,   # refills: 3  Last Office Visit :  1/6/2022  Future Office visit:   1/9/2023     Raul Rodriguez MD  Ophthalmology     Assessment/Plan:     1.  s/p repair of globe rupture/corneal dehiscence left eye (1/14/17)  2. S/p DSAEK, ant vit, refixated scleral Gortex sutured IOL - left eye (11/27/18)  S/p 25 gPPV/AFX/SF6 (12/10/18) for graft detachment              - graft diffusely edematous                           - Pachy 870 (central) <-- 846<--802 <-- 793 <-- 795 <-- 886 <-- 913                                      - increase PFATs to q1-2h              - Continue on FML BID OS              - cont ashutosh qid left eye              - Add JAMES OS every 2-3 hours for ANA - disc need for lubrication              - return precautions reviewed              - Removed 7 OC loose suture left eye (oflox placed afterward)              - RX left eye oflox qid x 4 days              - Monocular precautions.              2. AC vitreous prolapse OS              - s/p ant vitrectomy 11/27/18     3. Traumatic Aniridia OS     4. Glaucoma, OS              s/p left Ahmed valve              -IOP good today              Steroid (pred acetate) responder; Cosopt restarted early 3/2019 for increased IOP (mid 30s)               Continue Cosopt BID OS              Continue brimonidine BID OS               Continue latanoprost at bedtime OS       ALSO sees:  (used to see Liz)     RTC: 6 months with .     Mian Collazo, DO  Fellow, Cornea & External Disease  Department of Ophthalmology  [facility]      Routing refill request to provider for review/approval because:  Drug not on the FM, P or  Health refill protocol or controlled substance      Patricia Pastrana RN  Central Triage Red Flags/Med Refills

## 2023-06-03 ENCOUNTER — HEALTH MAINTENANCE LETTER (OUTPATIENT)
Age: 38
End: 2023-06-03

## 2023-12-15 DIAGNOSIS — Z94.7 CORNEA REPLACED BY TRANSPLANT: ICD-10-CM

## 2023-12-15 DIAGNOSIS — H40.62X0 STEROID INDUCED GLAUCOMA, LEFT EYE: ICD-10-CM

## 2023-12-15 DIAGNOSIS — T38.0X5A STEROID INDUCED GLAUCOMA, LEFT EYE: ICD-10-CM

## 2023-12-15 DIAGNOSIS — H40.32X0: ICD-10-CM

## 2023-12-15 RX ORDER — DORZOLAMIDE HYDROCHLORIDE AND TIMOLOL MALEATE 20; 5 MG/ML; MG/ML
1 SOLUTION/ DROPS OPHTHALMIC 2 TIMES DAILY
Qty: 10 ML | Refills: 0 | Status: SHIPPED | OUTPATIENT
Start: 2023-12-15 | End: 2024-05-20

## 2023-12-15 RX ORDER — BRIMONIDINE TARTRATE 2 MG/ML
1 SOLUTION/ DROPS OPHTHALMIC 2 TIMES DAILY
Qty: 10 ML | Refills: 0 | Status: SHIPPED | OUTPATIENT
Start: 2023-12-15 | End: 2024-04-08

## 2023-12-15 RX ORDER — FLUOROMETHOLONE 0.1 %
SUSPENSION, DROPS(FINAL DOSAGE FORM)(ML) OPHTHALMIC (EYE)
Qty: 10 ML | Refills: 0 | Status: SHIPPED | OUTPATIENT
Start: 2023-12-15 | End: 2024-04-07

## 2023-12-15 NOTE — TELEPHONE ENCOUNTER
fluorometholone (FML LIQUIFILM) 0.1 % ophthalmic suspension   Last Written Prescription Date:  11/25/2022  Last Fill Quantity: 10,   # refills: 4  Last Office Visit : 1/6/2022  Future Office visit:  1/2/2024  Routing refill request to provider for review/approval because:  Med need review by Provider for refills      brimonidine (ALPHAGAN) 0.2 % ophthalmic solution   Last Written Prescription Date:  1/6/2022  Last Fill Quantity: 10,   # refills: 3  Last Office Visit : 1/6/2022  Future Office visit:  1/2/2024  Routing refill request to provider for review/approval because:  Med need review by Provider for refills      dorzolamide-timolol (COSOPT) 2-0.5 % ophthalmic solution   Last Written Prescription Date:  1/6/2022  Last Fill Quantity: 10,   # refills: 3  Last Office Visit : 1/6/2022  Future Office visit:  1/2/2024  Routing refill request to provider for review/approval because:  Med need review by Provider for refills    Patricia Pastrana RN  Central Triage Red Flags/Med Refills

## 2023-12-15 NOTE — TELEPHONE ENCOUNTER
[facility] Call Center    Phone Message    May a detailed message be left on voicemail: yes     Reason for Call: Medication Refill Request    Has the patient contacted the pharmacy for the refill? Yes   Name of medication being requested:   fluorometholone (FML LIQUIFILM) 0.1 % ophthalmic suspension  brimonidine (ALPHAGAN) 0.2 % ophthalmic solution  dorzolamide-timolol (COSOPT) 2-0.5 % ophthalmic solution  Provider who prescribed the medication: Dr. Rodriguez  Pharmacy: [place] [id number] [address]  Date medication is needed: ASAP    Action Taken: Message routed to:  Other: Med Refills Team    Travel Screening: Not Applicable

## 2023-12-16 RX ORDER — FLUOROMETHOLONE 0.1 %
SUSPENSION, DROPS(FINAL DOSAGE FORM)(ML) OPHTHALMIC (EYE)
Qty: 10 ML | Refills: 4 | OUTPATIENT
Start: 2023-12-16

## 2023-12-16 NOTE — TELEPHONE ENCOUNTER
Patient is 1.5 years overdue for follow up however has appointment scheduled 1/2/24 with Dr. Rodriguez. Will provide 1 refill each to bridge to next appointment.

## 2023-12-27 NOTE — ANESTHESIA CARE TRANSFER NOTE
[facility]  ([facility], [place], [place], [place], [place], and [facility])    [facility]  (sees toddlers through college age young adults)  [address]  [place] [address]  [email or web address].[email or web address]/    [facility]  [address] [place], [place]  [phone]  email: [email or web address]  https://www.[facility].org/[facility]  (sees child and adult patients)  Locations throughout the [facility]  [phone]  www.[email or web address]    [facility]  [address]  [place], [place] [address]  Phone: [phone]  Https://www.Nakaya [email or web address]/    [facility]  [address] [place] [address] [phone]  https://www.[facility].com/care/specialty/mental-behavioral-health/childrens-mental-health/    [facility]  [address]  [address]  [place] [address]  Phone: [phone]  [email or web address]    [place] [facility] (sees ages 2-21)  Assessment Center located in [place], [place]  Intake line: [phone]  [email or web address]    Others to try  Patient: Enrique Gabriel    Procedure(s):  Left Eye Descemet Stripping Automated Endothelial Keratoplasty  Anterior Vitrectomy  REPOSITION INTRAOCULAR LENS    Diagnosis: Failed Corneal Transplant, Left Eye  Diagnosis Additional Information: No value filed.    Anesthesia Type:   General     Note:  Airway :Room Air  Patient transferred to:Phase II  Comments: Arrive Phase II, Stable, Airway Intact  131/92, 68,16,{Sa98%  All questions answered.Handoff Report: Identifed the Patient, Identified the Reponsible Provider, Reviewed the pertinent medical history, Discussed the surgical course, Reviewed Intra-OP anesthesia mangement and issues during anesthesia, Set expectations for post-procedure period and Allowed opportunity for questions and acknowledgement of understanding      Vitals: (Last set prior to Anesthesia Care Transfer)    CRNA VITALS  11/27/2018 1346 - 11/27/2018 1420      11/27/2018             Pulse: 67    Ht Rate: 63    SpO2: 99 %    Resp Rate (set): 10    EKG: Sinus rhythm                Electronically Signed By: MARK Haley CRNA  November 27, 2018  2:20 PM   (may have longer waits, may be places that only do diagnostic  evaluations if people are pursuing services there)    [facility]  (most appropriate if your child is under 13, and you want to obtain services through [facility])  Locations throughout [phone]  [email or web address]    [facility] (wait times may be lengthy)  Locations in [facility]  Https://[email or web address] [email or web address]/    [facility]  (wait times may be lengthy)  [address] [place] [address] [phone]  https://www.[facility].org/    [facility] Autism Day treatment   (half-day, 5 days a week with family involvement),   [phone]    [facility] services  [address] [place] [address]  [email or web address]  [phone]  https://[facility] HospitalI[facility] Apps.infirst Healthcare/    [facility]    [facility] Care Specialists  Counties: [place], [place], [place], [place]:  Call: [phone]  [place] call:   Https://[email or web address]    [facility]  (most appropriate if your child is under 13, and you want to obtain services through [facility])  Locations throughout [place] [phone]  Https://Capsearch/    [facility]  (most appropriate if you want to obtain services through [facility])  [facility] [phone]  Https://www.empoweringkidsperham.org/    [facility]  [place], [place]  [phone]  http://www.[email or web address]/    [facility]  (most appropriate if your child is under 13, and you want to obtain services through [facility])  Locations throughout [place] [phone]  Https://Capsearch/    __________________________________________________________    ADULT ASD Evaluation Resources    [facility]  (sees toddlers through college  age young adults)  [address]  [place] [address]  [email or web address]://[email or web address].Allvoices/    [facility]  [address]  [place] [address]  https://www.[facility].org/psychological-evaluation-treatment/    [facility]  (sees child and adult patients)  Locations throughout [facility]  [phone]  www.IPX.org    [place] [facility] (sees ages 2-21)  Assessment Center located in [place], [place]  Intake line: [phone]  https://www.[email or web address].Onit/    [facility]  Several lo?cations  Phone: [phone]  Website: Psychological Testing - [facility] (OnFarm)    Other locations that may provide autism testing for adults:    [facility] - https://www.synergyetherapy.Allvoices/  [facility] - https://Voltage SecurityluFlipastedActiveO/psychological-testing.html  Dr. Rodas at [facility] - https://[facility].Allvoices/about-us/our-team/kwadwo/  [facility] - https://www.[facility].com/  Dr. Maier of [facility] - https://integrativehealthmn.com/staff/staff-1/  Dr. Alesha Pérez - [email or web address]/  [facility] - http://www.psychrecoveryinc.com/psychTesting.html    For additional resources:  [place] Society of [place] - aus.org/resources

## 2024-04-05 DIAGNOSIS — H40.62X0 STEROID INDUCED GLAUCOMA, LEFT EYE: ICD-10-CM

## 2024-04-05 DIAGNOSIS — T38.0X5A STEROID INDUCED GLAUCOMA, LEFT EYE: ICD-10-CM

## 2024-04-05 DIAGNOSIS — Z94.7 CORNEA REPLACED BY TRANSPLANT: ICD-10-CM

## 2024-04-08 DIAGNOSIS — T38.0X5A STEROID INDUCED GLAUCOMA, LEFT EYE: ICD-10-CM

## 2024-04-08 DIAGNOSIS — H40.62X0 STEROID INDUCED GLAUCOMA, LEFT EYE: ICD-10-CM

## 2024-04-08 DIAGNOSIS — Z94.7 CORNEA REPLACED BY TRANSPLANT: ICD-10-CM

## 2024-04-08 DIAGNOSIS — H40.32X0: ICD-10-CM

## 2024-04-08 RX ORDER — FLUOROMETHOLONE 0.1 %
SUSPENSION, DROPS(FINAL DOSAGE FORM)(ML) OPHTHALMIC (EYE)
Qty: 10 ML | Refills: 0 | Status: SHIPPED | OUTPATIENT
Start: 2024-04-08 | End: 2024-04-09

## 2024-04-08 NOTE — TELEPHONE ENCOUNTER
Please advise patient that throat culture is normal. No additional treatment recommended at this time. brimonidine (ALPHAGAN) 0.2 % ophthalmic solution   Disp-10 mL, R-0   Start: 12/15/2023     1/6/2022  [facility]    Raul Rodriguez MD  Ophthalmology  RTC: 6 months with .     Nv:5/20/24    Routed because: hx 2 cancelled appt, 1 no show. More before that. Hx cornea replaced by transplant. Past due for 6 mths appt w . Rf requested.What do you want to do this.

## 2024-04-08 NOTE — TELEPHONE ENCOUNTER
fluorometholone (FML LIQUIFILM) 0.1 % ophthalmic suspension   Disp-10 mL, R-0   Start: 12/15/2023     1/6/2022  [facility]    Raul Rodriguez MD  Ophthalmology    Scheduling has been notified to contact the pt for appointment.      Routed because: provider review. Lv 1/6/22. A lot of canceled..no shows

## 2024-04-08 NOTE — TELEPHONE ENCOUNTER
[facility] Call Center    Phone Message    May a detailed message be left on voicemail: yes     Reason for Call: Medication Refill Request    Has the patient contacted the pharmacy for the refill? Yes   Name of medication being requested: brimonidine (ALPHAGAN) 0.2 % ophthalmic solution   Provider who prescribed the medication: Silvestre   Pharmacy: [place] [address] [address]    Date medication is needed: asap         Action Taken: Message routed to:  [facility] ([facility]): Ophthalmology    Travel Screening: Not Applicable

## 2024-04-09 RX ORDER — FLUOROMETHOLONE 0.1 %
SUSPENSION, DROPS(FINAL DOSAGE FORM)(ML) OPHTHALMIC (EYE)
Qty: 10 ML | Refills: 1 | Status: SHIPPED | OUTPATIENT
Start: 2024-04-09 | End: 2024-05-20

## 2024-04-09 RX ORDER — BRIMONIDINE TARTRATE 2 MG/ML
1 SOLUTION/ DROPS OPHTHALMIC 2 TIMES DAILY
Qty: 10 ML | Refills: 1 | Status: SHIPPED | OUTPATIENT
Start: 2024-04-09 | End: 2024-05-20

## 2024-04-09 NOTE — TELEPHONE ENCOUNTER
Pt scheduled future appt for May with .      Rx's for Brimonidine and FML sent until visit    Mario Lee RN 10:29 AM 04/09/24

## 2024-05-20 ENCOUNTER — OFFICE VISIT (OUTPATIENT)
Dept: OPHTHALMOLOGY | Facility: CLINIC | Age: 39
End: 2024-05-20
Attending: OPHTHALMOLOGY
Payer: COMMERCIAL

## 2024-05-20 DIAGNOSIS — T38.0X5A STEROID INDUCED GLAUCOMA, LEFT EYE: ICD-10-CM

## 2024-05-20 DIAGNOSIS — H40.32X3 GLAUCOMA ASSOCIATED WITH OCULAR TRAUMA, LEFT, SEVERE STAGE: ICD-10-CM

## 2024-05-20 DIAGNOSIS — H18.12 BULLOUS KERATOPATHY, LEFT EYE: Primary | ICD-10-CM

## 2024-05-20 DIAGNOSIS — Z94.7 CORNEA REPLACED BY TRANSPLANT: ICD-10-CM

## 2024-05-20 DIAGNOSIS — H40.62X0 STEROID INDUCED GLAUCOMA, LEFT EYE: ICD-10-CM

## 2024-05-20 PROCEDURE — 99213 OFFICE O/P EST LOW 20 MIN: CPT | Performed by: OPHTHALMOLOGY

## 2024-05-20 PROCEDURE — 76514 ECHO EXAM OF EYE THICKNESS: CPT | Performed by: OPHTHALMOLOGY

## 2024-05-20 PROCEDURE — 99214 OFFICE O/P EST MOD 30 MIN: CPT | Mod: GC | Performed by: OPHTHALMOLOGY

## 2024-05-20 RX ORDER — BRIMONIDINE TARTRATE 2 MG/ML
1 SOLUTION/ DROPS OPHTHALMIC 2 TIMES DAILY
Qty: 10 ML | Refills: 5 | Status: SHIPPED | OUTPATIENT
Start: 2024-05-20

## 2024-05-20 RX ORDER — DORZOLAMIDE HYDROCHLORIDE AND TIMOLOL MALEATE 20; 5 MG/ML; MG/ML
1 SOLUTION/ DROPS OPHTHALMIC 2 TIMES DAILY
Qty: 10 ML | Refills: 0 | Status: SHIPPED | OUTPATIENT
Start: 2024-05-20

## 2024-05-20 RX ORDER — FLUOROMETHOLONE 0.1 %
SUSPENSION, DROPS(FINAL DOSAGE FORM)(ML) OPHTHALMIC (EYE)
Qty: 10 ML | Refills: 5 | Status: SHIPPED | OUTPATIENT
Start: 2024-05-20

## 2024-05-20 ASSESSMENT — PACHYMETRY: OS_CT(UM): 888

## 2024-05-20 ASSESSMENT — VISUAL ACUITY
OS_PH_SC: 20/60
OS_SC: 20/200
METHOD: SNELLEN - LINEAR
OD_CC+: -1
CORRECTION_TYPE: GLASSES
OD_CC: 20/20

## 2024-05-20 ASSESSMENT — CONF VISUAL FIELD
OS_INFERIOR_TEMPORAL_RESTRICTION: 0
OD_INFERIOR_TEMPORAL_RESTRICTION: 0
OS_SUPERIOR_NASAL_RESTRICTION: 0
OD_SUPERIOR_TEMPORAL_RESTRICTION: 0
OS_SUPERIOR_TEMPORAL_RESTRICTION: 0
OD_NORMAL: 1
OS_NORMAL: 1
METHOD: COUNTING FINGERS
OD_INFERIOR_NASAL_RESTRICTION: 0
OS_INFERIOR_NASAL_RESTRICTION: 0
OD_SUPERIOR_NASAL_RESTRICTION: 0

## 2024-05-20 ASSESSMENT — CUP TO DISC RATIO
OS_RATIO: 0.6
OD_RATIO: 0.3

## 2024-05-20 ASSESSMENT — REFRACTION_WEARINGRX
OS_AXIS: 123
OS_SPHERE: -0.25
OD_AXIS: 125
OS_CYLINDER: +2.25
OD_SPHERE: -6.50
OD_CYLINDER: +3.25

## 2024-05-20 ASSESSMENT — SLIT LAMP EXAM - LIDS
COMMENTS: REACTIVE PTOSIS
COMMENTS: NORMAL

## 2024-05-20 ASSESSMENT — EXTERNAL EXAM - LEFT EYE: OS_EXAM: NORMAL

## 2024-05-20 ASSESSMENT — TONOMETRY
OD_IOP_MMHG: 15
IOP_METHOD: ICARE
OS_IOP_MMHG: 12

## 2024-05-20 ASSESSMENT — EXTERNAL EXAM - RIGHT EYE: OD_EXAM: NORMAL

## 2024-05-20 NOTE — PROGRESS NOTES
CC: s/p DSAEK OS    HPI: Enrique Gabriel male s/p globe repair, left eye. 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 Presented with dehiscence of cornea at limbus from 9 o'clock to 3'oclock with extremely hypotonous eye, vitreous in the anterior chamber, dislocated IOL, and vitreous hemorrhage. questionable Retinal detachment. Underwent resuturing of cornea to limbus and limited peritomy without evidence of scleral laceration.     Interval history 05/20/2024 :  Pt here for annual follow up. Feels that vision is stable. No pain redness tearing. Eyes feel dry. He stopped the ashutosh because it caused more dryness. No new flashes, floaters, diplopia.     POHx;  PKP for keratoconus OU 2002  Trauma to OS with ruptured globe 2003  Repeat PKP OS 2004  Previous cataract surgery done at [facility] in [place].   Secondary IOL left eye in 2003.   H/o graft rejection left eye treated in 2011.  Ahmed tube OS 10/3/17  DSAEK, ant vit, refixated scleral Gortex sutured IOL - left eye (11/27/18)  -s/p 25G pPV/AFX/SF6 10% left eye 12/10/18     Gtts:  Brimonidine BID left eye   Cosopt BID left eye     FML two times a day left (steroid response with pred forte), using this every couple of days in the right eye  PFATs prn 3-4 times a day     Assessment/Plan:     1.  s/p repair of globe rupture/corneal dehiscence left eye (1/14/17)  2. S/p DSAEK, ant vit, refixated scleral Gortex sutured IOL - left eye (11/27/18)  S/p 25 gPPV/AFX/SF6 (12/10/18) for graft detachment              - graft diffusely edematous                - Pachy 870 (central) <-- 846<--802 <-- 793 <-- 795 <-- 886 <-- 913                - increase PFATs to q1-2h   - Continue on FML BID OS              - STOP ashutosh  - uncomfortable   - Add JAMES OS every 2-3 hours for ANA - disc need for lubrication              - return precautions reviewed   - Removed 7 OC loose suture left eye  (oflox placed afterward)   - RX left eye oflox qid x 4 days   - Monocular precautions.                2. AC vitreous prolapse OS              - s/p ant vitrectomy 11/27/18     3. Traumatic Aniridia OS     4. Glaucoma, OS              s/p left Ahmed valve   -IOP good today              Steroid (pred acetate) responder; Cosopt restarted early 3/2019 for increased IOP (mid 30s)               Continue Cosopt BID OS              Continue brimonidine BID OS     ALSO sees:  (used to see Liz)    RTC: 12 months with .    Thank you for entrusting us with your care  Maribel Conrad MD, PGY3  Ophthalmology Resident  [facility]    Attending Physician Attestation:  Complete documentation of historical and exam elements from today's encounter can be found in the full encounter summary report (not reduplicated in this progress note).  I personally obtained the chief complaint(s) and history of present illness.  I confirmed and edited as necessary the review of systems, past medical/surgical history, family history, social history, and examination findings as documented by others; and I examined the patient myself.  I personally reviewed the relevant tests, images, and reports as documented above.  I formulated and edited as necessary the assessment and plan and discussed the findings and management plan with the patient and family. - Jose G Hou MD    --------------------------------------------------------------------------------------------------------------------------------------------  Pachymetry - Interpretation & Report  Indication: post corneal transplant, bullous keratopathy OS  Performed by: Jose G Hou MD  Reliability: good  Patient cooperation: good  Findings:   Left eye:  888 micrometers centrally   Interval Change, Assessment, & Impact on treatment:   Left eye:  slightly increased   Signed: Jose G Hou MD 5/20/2024 4:23 PM

## 2024-05-20 NOTE — NURSING NOTE
Chief Complaints and History of Present Illnesses   Patient presents with    Follow Up      2 year follow up s/p repair of globe rupture/corneal dehiscence left eye (1/14/17)     Chief Complaint(s) and History of Present Illness(es)       Follow Up              Comments:  2 year follow up s/p repair of globe rupture/corneal dehiscence left eye (1/14/17)              Comments    Pt states that vision has been stable over the past few years. No eye pain today. No new flashes or floaters.  Still having dryness in each eye, same as before.  Pt states that he has not been using Latanoprost or Sodium Chloride drops for about 1.5 years.    Jamar Singh,  [facility] May 20, 2024 1:00 PM

## 2024-07-13 ENCOUNTER — HEALTH MAINTENANCE LETTER (OUTPATIENT)
Age: 39
End: 2024-07-13

## 2024-12-28 NOTE — NURSING NOTE
Chief Complaints and History of Present Illnesses   Patient presents with     Follow Up     6 week follow up s/p repair of globe rupture/corneal dehiscence left eye (1/14/17)     Chief Complaint(s) and History of Present Illness(es)     Follow Up     Associated symptoms: Negative for eye pain    Comments: 6 week follow up s/p repair of globe rupture/corneal dehiscence left eye (1/14/17)              Comments     Pt states vision is slightly better today. No flashers or floaters. Irritation in LE that comes and goes, limited relief with drops.    Jamar FISCHER February 18, 2019 11:00 AM                   stated

## 2025-06-11 DIAGNOSIS — H40.62X0 STEROID INDUCED GLAUCOMA, LEFT EYE: ICD-10-CM

## 2025-06-11 DIAGNOSIS — Z94.7 CORNEA REPLACED BY TRANSPLANT: ICD-10-CM

## 2025-06-11 DIAGNOSIS — T38.0X5A STEROID INDUCED GLAUCOMA, LEFT EYE: ICD-10-CM

## 2025-06-12 RX ORDER — FLUOROMETHOLONE 1 MG/ML
SUSPENSION/ DROPS OPHTHALMIC
Qty: 5 ML | Refills: 2 | Status: SHIPPED | OUTPATIENT
Start: 2025-06-12

## 2025-06-12 NOTE — TELEPHONE ENCOUNTER
Last Written Prescription:  fluorometholone (FML LIQUIFILM) 0.1 % ophthalmic suspension 10 mL 5 5/20/2024     ----------------------  Last Visit Date: 5/20/24  Future Visit Date: none  ----------------------  Continue on FML BID OS   RTC: 12 months with .       Refill decision: Medication unable to be refilled by RN due to: Medication not included in refill protocol policy     Request from pharmacy:  Requested Prescriptions   Pending Prescriptions Disp Refills    fluorometholone (FML LIQUIFILM) 0.1 % ophthalmic suspension 10 mL 5     Sig: Instill 1 drop in the right eye twice weekly and instill 1 drop in the left eye two times daily .       There is no refill protocol information for this order

## 2025-06-12 NOTE — TELEPHONE ENCOUNTER
Pt due for yearly exam (last exam in May 2024)    Rx for FML sent (ok for 90 day supply).    Note to  to assist in reaching out/scheduling yearly exam.    Mario Lee RN 11:36 AM 06/12/25

## 2025-06-17 ENCOUNTER — TELEPHONE (OUTPATIENT)
Dept: OPHTHALMOLOGY | Facility: CLINIC | Age: 40
End: 2025-06-17
Payer: COMMERCIAL

## 2025-07-19 ENCOUNTER — HEALTH MAINTENANCE LETTER (OUTPATIENT)
Age: 40
End: 2025-07-19

## (undated) DEVICE — NDL 18GA 1.5" 305196

## (undated) DEVICE — EYE KNIFE STILETTO VISITEC 1.1MM ANG 45DEG SIDEPORT 376620

## (undated) DEVICE — LINEN TOWEL PACK X5 5464

## (undated) DEVICE — PACK CATARACT UMMC

## (undated) DEVICE — Device

## (undated) DEVICE — LABEL MEDICATION SYSTEM 3303-P

## (undated) DEVICE — SU PLAIN 6-0 TG140-8 18" 1735G

## (undated) DEVICE — EYE CANN IRR 27GA ANTERIOR CHAMBER 581280

## (undated) DEVICE — EYE PACK CUSTOM ANTERIOR 30DEG TIP CENTURION PPK6682-04

## (undated) DEVICE — SU SILK 4-0 G-3DA 18" 783G

## (undated) DEVICE — DRAPE MICRO 41X81"

## (undated) DEVICE — SOL NACL 0.9% IRRIG 500ML BOTTLE 2F7123

## (undated) DEVICE — SU PROLENE 10-0 2XSSL 15 3" JA2559

## (undated) DEVICE — EYE KNIFE SLIT XSTAR VISITEC 2.6MM 45DEG 373726

## (undated) DEVICE — NDL 22GA 1.5"

## (undated) DEVICE — SYR 05ML LL W/O NDL

## (undated) DEVICE — DRAPE SHEET MED 44X70" 9355

## (undated) DEVICE — EYE NDL RETROBULBAR 25GA 1.5" 581275

## (undated) DEVICE — EYE PREP BETADINE 5% SOLUTION 30ML 0065-0411-30

## (undated) DEVICE — TAPE MICROPORE 1"X1.5YD 1530S-1

## (undated) DEVICE — NDL BLUNT 18GA 1" W/O FILTER 305181

## (undated) DEVICE — EYE CANN IRR 30GA  ANTERIOR CHAMBER 581273

## (undated) DEVICE — STRAP ARMBOARD IV POSITIONING 1.5X32" VELCRO 31142980

## (undated) DEVICE — TAPE MICROPORE 2"X1.5YD 1530S-2

## (undated) DEVICE — EYE SPONGE SPEAR WECK CEL 0008685

## (undated) DEVICE — PACK VITRECTOMY PQ15VI57E

## (undated) DEVICE — ESU CORD BIPOLAR GREEN 10-4000

## (undated) DEVICE — SU ETHILON 10-0 CS160-6 12" 9000G

## (undated) DEVICE — EYE TIP BIPOLAR 18GA ERASER 221250

## (undated) DEVICE — PACK CATARACT CUSTOM SO DALE SEY32CTFCX

## (undated) DEVICE — EYE SHIELD PLASTIC

## (undated) DEVICE — EYE SOL BSS 500ML BAG 0065-1795-04

## (undated) DEVICE — BLADE KNIFE BEAVER MICROSHARP GREEN 377515

## (undated) DEVICE — EYE TIP IRRIGATION & ASPIRATION POLYMER 35D BENT 8065751511

## (undated) DEVICE — TUBING SUCTION 12"X1/4" N612

## (undated) DEVICE — NDL 30GA 0.5" 305106

## (undated) DEVICE — EYE PACK 25GA PLUS CONSTELLATION PPK4253

## (undated) DEVICE — EYE KIT AUTO GAS FOR CONSTELLATION 8065751014

## (undated) DEVICE — EYE DRAPE MICROSCOPE UNIVERSAL (BIOM FULL) 08-MK55140

## (undated) DEVICE — SOL NACL 0.9% 10ML VIAL 0409-4888-02

## (undated) DEVICE — SYR 01ML 27GA 0.5" ECLIPSE 305789

## (undated) DEVICE — SU VICRYL 8-0 TG160-8 8" J574G

## (undated) DEVICE — GLOVE PROTEXIS MICRO 6.0  2D73PM60

## (undated) DEVICE — EYE PUNCH VACUUM BARRON 8.5MM K20-2110

## (undated) DEVICE — PACK CATARACT CUSTOM ASC SEY15CPUMC

## (undated) DEVICE — EYE SHIELD FOX  W/GARTER ADULT 202

## (undated) DEVICE — SU VICRYL 8-0 TG140-8DA 12" J548G

## (undated) DEVICE — SOL NACL 0.9% IRRIG 1000ML BOTTLE 2F7124

## (undated) DEVICE — EYE PACK CONSTELLATION VITRECTOMY TOTAL PLUS 25GA 8065751462

## (undated) DEVICE — DRAPE STERI APERATURE 51X33" 1030

## (undated) DEVICE — PEN MARKING SKIN FINE 31145942

## (undated) DEVICE — CONNECTOR STOPCOCK 3 WAY MALE LL 2C6204

## (undated) DEVICE — EYE KNIFE CRESCENT 55DEG 373807

## (undated) DEVICE — GLOVE PROTEXIS MICRO 6.5  2D73PM65

## (undated) DEVICE — TUBING SUCTION 10'X3/16" N510

## (undated) DEVICE — EYE MARKING PAD 581057

## (undated) DEVICE — EYE CANN IRR 20GA ACM LEWICKY 585061

## (undated) DEVICE — EYE KNIFE SLIT XSTAR VISITEC 2.8MM 45DEG 373728

## (undated) DEVICE — EYE DRSG PAD OVAL

## (undated) DEVICE — TAPE DURAPORE 3" SILK 1538-3

## (undated) DEVICE — GOWN IMPERVIOUS BREATHABLE SMART XLG 89045

## (undated) DEVICE — APPLICATOR COTTON TIP 6"X2 STERILE LF 6012

## (undated) DEVICE — EYE NDL RETROBULBAR 25GA 60-111637

## (undated) DEVICE — SOL WATER 10ML VIAL 6332318510

## (undated) DEVICE — EYE GAS SF6

## (undated) RX ORDER — LIDOCAINE HYDROCHLORIDE 20 MG/ML
INJECTION, SOLUTION EPIDURAL; INFILTRATION; INTRACAUDAL; PERINEURAL
Status: DISPENSED
Start: 2018-11-27

## (undated) RX ORDER — ONDANSETRON 2 MG/ML
INJECTION INTRAMUSCULAR; INTRAVENOUS
Status: DISPENSED
Start: 2018-11-27

## (undated) RX ORDER — GABAPENTIN 300 MG/1
CAPSULE ORAL
Status: DISPENSED
Start: 2018-11-27

## (undated) RX ORDER — DEXAMETHASONE SODIUM PHOSPHATE 4 MG/ML
INJECTION, SOLUTION INTRA-ARTICULAR; INTRALESIONAL; INTRAMUSCULAR; INTRAVENOUS; SOFT TISSUE
Status: DISPENSED
Start: 2017-01-14

## (undated) RX ORDER — FENTANYL CITRATE 50 UG/ML
INJECTION, SOLUTION INTRAMUSCULAR; INTRAVENOUS
Status: DISPENSED
Start: 2018-12-10

## (undated) RX ORDER — BALANCED SALT SOLUTION 6.4; .75; .48; .3; 3.9; 1.7 MG/ML; MG/ML; MG/ML; MG/ML; MG/ML; MG/ML
SOLUTION OPHTHALMIC
Status: DISPENSED
Start: 2017-01-14

## (undated) RX ORDER — OXYCODONE AND ACETAMINOPHEN 5; 325 MG/1; MG/1
TABLET ORAL
Status: DISPENSED
Start: 2017-01-14

## (undated) RX ORDER — PROPOFOL 10 MG/ML
INJECTION, EMULSION INTRAVENOUS
Status: DISPENSED
Start: 2018-11-27

## (undated) RX ORDER — CELECOXIB 200 MG/1
CAPSULE ORAL
Status: DISPENSED
Start: 2018-11-27

## (undated) RX ORDER — LIDOCAINE HYDROCHLORIDE AND EPINEPHRINE 10; 10 MG/ML; UG/ML
INJECTION, SOLUTION INFILTRATION; PERINEURAL
Status: DISPENSED
Start: 2018-11-27

## (undated) RX ORDER — OXYCODONE HYDROCHLORIDE 5 MG/1
TABLET ORAL
Status: DISPENSED
Start: 2018-12-10

## (undated) RX ORDER — FENTANYL CITRATE 50 UG/ML
INJECTION, SOLUTION INTRAMUSCULAR; INTRAVENOUS
Status: DISPENSED
Start: 2017-01-14

## (undated) RX ORDER — ACETAMINOPHEN 325 MG/1
TABLET ORAL
Status: DISPENSED
Start: 2018-11-27